# Patient Record
Sex: FEMALE | Race: WHITE | NOT HISPANIC OR LATINO | Employment: OTHER | URBAN - METROPOLITAN AREA
[De-identification: names, ages, dates, MRNs, and addresses within clinical notes are randomized per-mention and may not be internally consistent; named-entity substitution may affect disease eponyms.]

---

## 2017-02-08 ENCOUNTER — ALLSCRIPTS OFFICE VISIT (OUTPATIENT)
Dept: OTHER | Facility: OTHER | Age: 82
End: 2017-02-08

## 2017-02-08 DIAGNOSIS — Z13.820 ENCOUNTER FOR SCREENING FOR OSTEOPOROSIS: ICD-10-CM

## 2017-02-08 DIAGNOSIS — I10 ESSENTIAL (PRIMARY) HYPERTENSION: ICD-10-CM

## 2017-03-06 ENCOUNTER — HOSPITAL ENCOUNTER (OUTPATIENT)
Dept: BONE DENSITY | Facility: IMAGING CENTER | Age: 82
Discharge: HOME | End: 2017-03-06
Payer: MEDICARE

## 2017-03-06 DIAGNOSIS — Z13.820 ENCOUNTER FOR SCREENING FOR OSTEOPOROSIS: ICD-10-CM

## 2017-03-06 PROCEDURE — 77080 DXA BONE DENSITY AXIAL: CPT

## 2017-03-07 ENCOUNTER — GENERIC CONVERSION - ENCOUNTER (OUTPATIENT)
Dept: OTHER | Facility: OTHER | Age: 82
End: 2017-03-07

## 2017-06-14 ENCOUNTER — HOSPITAL ENCOUNTER (EMERGENCY)
Facility: HOSPITAL | Age: 82
Discharge: NURSING, REHAB OR LONG-TERM CARE | End: 2017-06-14
Attending: EMERGENCY MEDICINE | Admitting: EMERGENCY MEDICINE
Payer: MEDICARE

## 2017-06-14 ENCOUNTER — APPOINTMENT (EMERGENCY)
Dept: CT IMAGING | Facility: HOSPITAL | Age: 82
End: 2017-06-14
Payer: MEDICARE

## 2017-06-14 ENCOUNTER — APPOINTMENT (EMERGENCY)
Dept: RADIOLOGY | Facility: HOSPITAL | Age: 82
End: 2017-06-14
Payer: MEDICARE

## 2017-06-14 VITALS
DIASTOLIC BLOOD PRESSURE: 57 MMHG | RESPIRATION RATE: 16 BRPM | OXYGEN SATURATION: 100 % | HEART RATE: 61 BPM | SYSTOLIC BLOOD PRESSURE: 114 MMHG | TEMPERATURE: 97.6 F

## 2017-06-14 DIAGNOSIS — S73.102A SPRAIN OF LEFT HIP, INITIAL ENCOUNTER: Primary | ICD-10-CM

## 2017-06-14 DIAGNOSIS — N39.0 URINARY TRACT INFECTION: ICD-10-CM

## 2017-06-14 LAB
ALBUMIN SERPL BCP-MCNC: 2.9 G/DL (ref 3.5–5)
ALP SERPL-CCNC: 89 U/L (ref 46–116)
ALT SERPL W P-5'-P-CCNC: 18 U/L (ref 12–78)
ANION GAP SERPL CALCULATED.3IONS-SCNC: 4 MMOL/L (ref 4–13)
APTT PPP: 28 SECONDS (ref 23–35)
AST SERPL W P-5'-P-CCNC: 21 U/L (ref 5–45)
ATRIAL RATE: 74 BPM
BACTERIA UR QL AUTO: ABNORMAL /HPF
BASOPHILS # BLD AUTO: 0.01 THOUSANDS/ΜL (ref 0–0.1)
BASOPHILS NFR BLD AUTO: 0 % (ref 0–1)
BILIRUB SERPL-MCNC: 0.9 MG/DL (ref 0.2–1)
BILIRUB UR QL STRIP: NEGATIVE
BUN SERPL-MCNC: 18 MG/DL (ref 5–25)
CALCIUM SERPL-MCNC: 8.9 MG/DL (ref 8.3–10.1)
CHLORIDE SERPL-SCNC: 109 MMOL/L (ref 100–108)
CLARITY UR: CLEAR
CO2 SERPL-SCNC: 28 MMOL/L (ref 21–32)
COLOR UR: YELLOW
CREAT SERPL-MCNC: 0.68 MG/DL (ref 0.6–1.3)
EOSINOPHIL # BLD AUTO: 0.18 THOUSAND/ΜL (ref 0–0.61)
EOSINOPHIL NFR BLD AUTO: 3 % (ref 0–6)
ERYTHROCYTE [DISTWIDTH] IN BLOOD BY AUTOMATED COUNT: 14 % (ref 11.6–15.1)
GFR SERPL CREATININE-BSD FRML MDRD: >60 ML/MIN/1.73SQ M
GLUCOSE SERPL-MCNC: 106 MG/DL (ref 65–140)
GLUCOSE UR STRIP-MCNC: NEGATIVE MG/DL
HCT VFR BLD AUTO: 37.4 % (ref 34.8–46.1)
HGB BLD-MCNC: 12.5 G/DL (ref 11.5–15.4)
HGB UR QL STRIP.AUTO: ABNORMAL
INR PPP: 1.01 (ref 0.86–1.16)
KETONES UR STRIP-MCNC: NEGATIVE MG/DL
LEUKOCYTE ESTERASE UR QL STRIP: ABNORMAL
LYMPHOCYTES # BLD AUTO: 2.32 THOUSANDS/ΜL (ref 0.6–4.47)
LYMPHOCYTES NFR BLD AUTO: 39 % (ref 14–44)
MCH RBC QN AUTO: 28.9 PG (ref 26.8–34.3)
MCHC RBC AUTO-ENTMCNC: 33.4 G/DL (ref 31.4–37.4)
MCV RBC AUTO: 86 FL (ref 82–98)
MONOCYTES # BLD AUTO: 0.52 THOUSAND/ΜL (ref 0.17–1.22)
MONOCYTES NFR BLD AUTO: 9 % (ref 4–12)
NEUTROPHILS # BLD AUTO: 2.92 THOUSANDS/ΜL (ref 1.85–7.62)
NEUTS SEG NFR BLD AUTO: 49 % (ref 43–75)
NITRITE UR QL STRIP: NEGATIVE
NON-SQ EPI CELLS URNS QL MICRO: ABNORMAL /HPF
P AXIS: -21 DEGREES
PH UR STRIP.AUTO: 6.5 [PH] (ref 4.5–8)
PLATELET # BLD AUTO: 168 THOUSANDS/UL (ref 149–390)
PMV BLD AUTO: 9.9 FL (ref 8.9–12.7)
POTASSIUM SERPL-SCNC: 4.3 MMOL/L (ref 3.5–5.3)
PR INTERVAL: 152 MS
PROT SERPL-MCNC: 6.3 G/DL (ref 6.4–8.2)
PROT UR STRIP-MCNC: NEGATIVE MG/DL
PROTHROMBIN TIME: 13.1 SECONDS (ref 12.1–14.4)
QRS AXIS: 15 DEGREES
QRSD INTERVAL: 80 MS
QT INTERVAL: 414 MS
QTC INTERVAL: 459 MS
RBC # BLD AUTO: 4.33 MILLION/UL (ref 3.81–5.12)
RBC #/AREA URNS AUTO: ABNORMAL /HPF
SODIUM SERPL-SCNC: 141 MMOL/L (ref 136–145)
SP GR UR STRIP.AUTO: <=1.005 (ref 1–1.03)
SPECIMEN SOURCE: NORMAL
T WAVE AXIS: 42 DEGREES
TROPONIN I BLD-MCNC: 0 NG/ML (ref 0–0.08)
UROBILINOGEN UR QL STRIP.AUTO: 0.2 E.U./DL
VENTRICULAR RATE: 74 BPM
WBC # BLD AUTO: 5.95 THOUSAND/UL (ref 4.31–10.16)
WBC #/AREA URNS AUTO: ABNORMAL /HPF

## 2017-06-14 PROCEDURE — 74176 CT ABD & PELVIS W/O CONTRAST: CPT

## 2017-06-14 PROCEDURE — 36415 COLL VENOUS BLD VENIPUNCTURE: CPT | Performed by: EMERGENCY MEDICINE

## 2017-06-14 PROCEDURE — 85610 PROTHROMBIN TIME: CPT | Performed by: EMERGENCY MEDICINE

## 2017-06-14 PROCEDURE — 96361 HYDRATE IV INFUSION ADD-ON: CPT

## 2017-06-14 PROCEDURE — 85025 COMPLETE CBC W/AUTO DIFF WBC: CPT | Performed by: EMERGENCY MEDICINE

## 2017-06-14 PROCEDURE — 99285 EMERGENCY DEPT VISIT HI MDM: CPT

## 2017-06-14 PROCEDURE — 70450 CT HEAD/BRAIN W/O DYE: CPT

## 2017-06-14 PROCEDURE — 96360 HYDRATION IV INFUSION INIT: CPT

## 2017-06-14 PROCEDURE — 85730 THROMBOPLASTIN TIME PARTIAL: CPT | Performed by: EMERGENCY MEDICINE

## 2017-06-14 PROCEDURE — 71010 HB CHEST X-RAY 1 VIEW FRONTAL (PORTABLE): CPT

## 2017-06-14 PROCEDURE — 80053 COMPREHEN METABOLIC PANEL: CPT | Performed by: EMERGENCY MEDICINE

## 2017-06-14 PROCEDURE — 71250 CT THORAX DX C-: CPT

## 2017-06-14 PROCEDURE — 81001 URINALYSIS AUTO W/SCOPE: CPT | Performed by: EMERGENCY MEDICINE

## 2017-06-14 PROCEDURE — 84484 ASSAY OF TROPONIN QUANT: CPT

## 2017-06-14 PROCEDURE — 93005 ELECTROCARDIOGRAM TRACING: CPT

## 2017-06-14 PROCEDURE — 72125 CT NECK SPINE W/O DYE: CPT

## 2017-06-14 RX ORDER — SULFAMETHOXAZOLE AND TRIMETHOPRIM 800; 160 MG/1; MG/1
1 TABLET ORAL ONCE
Status: COMPLETED | OUTPATIENT
Start: 2017-06-14 | End: 2017-06-14

## 2017-06-14 RX ORDER — SULFAMETHOXAZOLE AND TRIMETHOPRIM 800; 160 MG/1; MG/1
1 TABLET ORAL 2 TIMES DAILY
Qty: 10 TABLET | Refills: 0 | Status: SHIPPED | OUTPATIENT
Start: 2017-06-14 | End: 2017-06-19

## 2017-06-14 RX ADMIN — SULFAMETHOXAZOLE AND TRIMETHOPRIM 1 TABLET: 800; 160 TABLET ORAL at 04:05

## 2017-06-14 RX ADMIN — SODIUM CHLORIDE 500 ML: 0.9 INJECTION, SOLUTION INTRAVENOUS at 02:27

## 2017-12-13 ENCOUNTER — ALLSCRIPTS OFFICE VISIT (OUTPATIENT)
Dept: OTHER | Facility: OTHER | Age: 82
End: 2017-12-13

## 2017-12-13 DIAGNOSIS — R73.01 IMPAIRED FASTING GLUCOSE: ICD-10-CM

## 2017-12-13 DIAGNOSIS — I48.0 PAROXYSMAL ATRIAL FIBRILLATION (HCC): ICD-10-CM

## 2017-12-13 DIAGNOSIS — I10 ESSENTIAL (PRIMARY) HYPERTENSION: ICD-10-CM

## 2017-12-14 NOTE — PROGRESS NOTES
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 (434 91,784 3) (I63 9,R47 01)   4  Hypertension (401 9) (I10)   5  Impaired fasting glucose (790 21) (R73 01)    Plan  Hypertension    · Metoprolol Tartrate 50 MG Oral Tablet (Lopressor); TAKE 1 TABLET THREE TIMES  DAILY   · (1) CBC/ PLT (NO DIFF); Status:Active; Requested for:86Atz5226;    · (1) COMPREHENSIVE METABOLIC PANEL; Status:Active; Requested for:69Qtc0601;    · (1) T4, FREE; Status:Active; Requested for:52Hfu7143;    · (1) TSH; Status:Active; Requested for:02Uxl9276;   Impaired fasting glucose    · (1) HEMOGLOBIN A1C; Status:Active; Requested for:80Dru5794;   PMH: History of atrial fibrillation    · Eliquis 2 5 MG Oral Tablet  Seizure, epileptic    · LevETIRAcetam 750 MG Oral Tablet; TAKE 1 TABLET TWICE DAILY    Discussion/Summary  Discussion Summary:   Will stop Eliquis at today and will start warfarin 2 5 mg daily on December 14th  Will check INR on December 18th along with CBC, CMP, TSH, free T4 and hemoglobin A1c review of records from February patient had impaired fasting glucose and her TSH was low  Counseling Documentation With Imm: The patient was counseled regarding instructions for management  Chief Complaint  Chief Complaint Free Text Note Form: Pt here for f/u  She did not want me to push her back to the room  She didnât want anyone near her  She did let me get her bp though  She denied wt and ht  dk       History of Present Illness  Atrial Fibrillation (Follow-Up): The patient presents with paroxysmal atrial fibrillation  Symptoms: denies palpitations  Risks: increased risk for falling  Medications: the patient is adherent with her medication regimen  Seizure Disorder (Follow-Up): The patient is being seen for a routine clinic follow-up of seizure disorder  Associated symptoms:  no headache  Current treatment includes levetiracetam (Keppra)  Hypertension (Follow-Up): The patient presents for follow-up of essential hypertension  The patient states she has been doing well with her blood pressure control since the last visit  Symptoms: denies dyspnea,-- denies chest pain-- and-- denies lower extremity edema  Medications: the patient is adherent with her medication regimen  -- She denies medication side effects  HPI: I am seeing patient for anticoagulation reassessment  Patient had history of stroke due to paroxysmal atrial fibrillation  Patient has been on Eliquis which is very expensive and patient's son requests that we switch her to warfarin  spoke with gap be from August patient's nurse who told me that patient has no evidence of bleeding  she has no difficulty swallowing, cough or choking on swallowing  She is wheelchair-bound basically  She had a couple of falls without any injuries  She had no seizures and overall has been stable  Review of Systems  Complete-Female:  Constitutional: no fever-- and-- no chills  ENT: no nasal discharge  Cardiovascular: no chest pain-- and-- no palpitations  Respiratory: no shortness of breath-- and-- no cough  Gastrointestinal: no abdominal pain,-- no constipation-- and-- no blood in stools  Genitourinary: no dysuria-- and-- no unexplained vaginal bleeding  Neurological: no headache  Psychiatric: no depression  Active Problems  1  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 (434 91,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 3) (I63 9,R47 01)   2  Hyperlipidemia (272 4) (E78 5)   3  Hypertension (401 9) (I10)   4  Impaired fasting glucose (790 21) (R73 01)   5  Seizure, epileptic (345 90) (G40 909)    Past Medical History  1  History of Cellulitis (682 9) (L03 90)   2  History of Closed Fracture Of The Fibula Shaft (823 21)   3  History of Confusion (298 9) (R41 0)   4  History of Fall (E888 9) [facility])   5  History of Fracture, thoracic vertebra, compression (805 2) (S22 000A)   6   History of Herpes zoster without complications (834 3) (B02 9)   7  History of being hospitalized (V13 9) (Z92 89)   8  History of Laceration (879 8)   9  History of Osteoporosis (733 00) (M81 0)   10  History of Rash (782 1) (R21)   11  History of Right leg weakness (729 89) (K81 022)  Active Problems And Past Medical History Reviewed: The active problems and past medical history were reviewed and updated today  Family History  Father    1  Family history of cardiac disorder (V17 49) (Z82 49)  Sister    2  Family history of Dementia    Social History     · Cultural background   · Former smoker (V15 82) (K83 148)   · Living in an assisted living facility   · Non drinker / no alcohol use   · Primary spoken language English   · Racial background   ·   Social History Reviewed: The social history was reviewed and updated today  Current Meds   1  Atorvastatin Calcium 10 MG Oral Tablet; TAKE 1 TABLET DAILY AT BEDTIME  Requested for: 24Feb2017; Last Rx:24Feb2017 Ordered   2  Bisacodyl 10 MG Rectal Suppository; Therapy: (Recorded:12Sep2016) to Recorded   3  Calcium 500 MG TABS; Therapy: (Recorded:12Sep2016) to Recorded   4  D3-1000 1000 UNIT Oral Tablet; TAKE 1 TABLET DAILY; Therapy: (Recorded:04Mar2014) to Recorded   5  Eliquis 2 5 MG Oral Tablet; 1 BID; Therapy: (Recorded:02Niu0759) to Recorded   6  Lactulose 10 GM/15ML Oral Solution; TAKE 15 ML DAILY; Therapy: 72PRB7719 to Recorded   7  LevETIRAcetam 750 MG Oral Tablet; TAKE 1 TABLET TWICE DAILY; Therapy: 09KCD9630 to (Evaluate:24Jun2017)  Requested for: 25FFP9734; Last Rx:24Feb2017 Ordered   8  Mapap 325 MG Oral Tablet; Therapy: (Recorded:22Yoh2000) to Recorded   9  Metoprolol Tartrate 50 MG Oral Tablet (Lopressor); TAKE 1 TABLET THREE TIMES  DAILY; Therapy: (Colan Renee) to  Requested for: 28Feb2017 Recorded   10  Milk of Magnesia 400 MG/5ML Oral Suspension; Therapy: (Recorded:87Kdw9891) to Recorded   11   Nitrofurantoin Monohyd Macro 100 MG Oral Capsule; TAKE 1 CAPSULE EVERY 12 HOURS DAILY; Therapy: 99VMG8814 to (Evaluate:79Bek0059); Last Rx:96Agt6066 Ordered   12  Oxycodone-Acetaminophen 5-325 MG Oral Tablet; Therapy: (Recorded:85Piv0539) to Recorded   13  Oyster Shell Calcium/Vitamin D 500-200 MG-UNIT Oral Tablet; take 1 tab daily - Please given the  same as before; Therapy: 40YBQ8014 to (Last Rx:56Esr3327)  Requested for: 18Dqz0043 Ordered   14  Tylenol 325 MG Oral Tablet; TAKE 2 TABLET Every 4 hours PRN pain, fever; Therapy: 09NHF2089 to Recorded  Medication List Reviewed: The medication list was reviewed and updated today  Allergies  1  ceftriaxone   2  Codeine Derivatives   3  LevoFLOXacin TABS  4  No Known Environmental Allergies   5  No Known Food Allergies    Vitals  Vital Signs    Recorded: 13Dec2017 08:51AM   Heart Rate 72   Respiration 16   Systolic 535   Diastolic 82       Physical Exam   Constitutional  General appearance: No acute distress, well appearing and well nourished  well developed-- and-- comfortable  Pulmonary  Auscultation of lungs: Clear to auscultation  Cardiovascular  Auscultation of heart: Normal rate and rhythm, normal S1 and S2, without murmurs  The heart rate was normal  The rhythm was regular  Abdomen  Abdomen: Non-tender, no masses  Lymphatic  Palpation of lymph nodes in neck: No lymphadenopathy  Neurologic  Reflexes: Abnormal  -- patient has mild right-sided glen paresis and expressive aphasia  Psychiatric  Mood and affect: Normal          Future Appointments    Date/Time Provider Specialty Site   01/11/2018 10:45 AM NOHEMY Calvin   Internal Medicine [facility] INTERNAL MED       Signatures   Electronically signed by : NOHEMY Arroyo ; Dec 13 2017  9:12AM EST                       (Author)

## 2018-01-11 ENCOUNTER — ALLSCRIPTS OFFICE VISIT (OUTPATIENT)
Dept: OTHER | Facility: OTHER | Age: 83
End: 2018-01-11

## 2018-01-12 ENCOUNTER — GENERIC CONVERSION - ENCOUNTER (OUTPATIENT)
Dept: OTHER | Facility: OTHER | Age: 83
End: 2018-01-12

## 2018-01-12 NOTE — RESULT NOTES
Message   c diff neg     Verified Results  (1) C  DIFFICILE TOXIN BY PCR [id number] 08:50PM Edilberto Omi     Test Name Result Flag Reference   C  DIFFICILE TOXIN BY PCR   NEGATIVE for C difficle toxin by PCR  NEGATIVE for C difficle toxin by PCR

## 2018-01-14 VITALS
SYSTOLIC BLOOD PRESSURE: 114 MMHG | DIASTOLIC BLOOD PRESSURE: 78 MMHG | HEART RATE: 82 BPM | TEMPERATURE: 96.9 F | HEIGHT: 63 IN

## 2018-01-17 NOTE — RESULT NOTES
Verified Results  * DXA BONE DENSITY SPINE HIP AND PELVIS [id number] 01:13PM Ramy Riley Order Number: [[id number]]    - Patient Instructions: To schedule this appointment, please contact Central Scheduling at [phone]  Test Name Result Flag Reference   DXA BONE DENSITY SPINE HIP AND PELVIS (Report)     DXA SCAN     CLINICAL HISTORY: 80-year-old woman  Agent menopause not known  OTHER RISK FACTORS: History of left hip fracture following a low level of injury  TECHNIQUE: Bone densitometry was performed using a H[employer] Horizon A bone densitometer  Regions of interest appear properly placed  There are no prior DXA studies performed on this unit for comparison  COMPARISON: None     RESULTS:      LUMBAR SPINE L1-L4 (L2): BMD 0 799 gm/cm2 / T-score -2 3 / Z score 0 5   (Lumbar levels within parentheses represent vertebrae excluded from analysis due to local structural abnormalities or artifact)  RIGHT TOTAL HIP: BMD 0 557 gm/cm2 / T-score -3 2 / Z score -1 0   RIGHT FEMORAL NECK: BMD 0 572 gm/cm2 / T score -2 5 / Z score -0 1       IMPRESSION:     1  Osteoporosis  2  The 10 year risk of hip fracture is 8 7% with the 10 year risk of major osteoporotic fracture being 26% as calculated by the [facility]/WHO fracture risk assessment tool (FRAX)  3  The current NOF guidelines recommend treating patients with a T-score of -2 5 or less in the lumbar spine or hips, or in post-menopausal women and men over the age of 48 with low bone mass (osteopenia) and a FRAX 10 year risk score of >3% for hip    fracture and/or >20% for major osteoporotic fracture  4  A daily intake of at least 1200 mg calcium and vitamin D 800- 1000 IU, as well as weight bearing and muscle strengthening exercise, fall prevention and avoidance of tobacco and excessive alcohol as preventive measurements are suggested  5  Follow-up DXA in two years is recommended for most patients   A one year follow-up DXA is recommended after initiation or change in therapy for osteoporosis  More frequent evaluation is also appropriate for patients with conditions associated with    rapid bone loss, such as glucocorticoid therapy  The FRAX tool has not been validated in patients currently or previously treated with pharmacotherapy for osteoporosis  In such patients, clinical judgment must be exercised in interpreting FRAX scores  It is not appropriate to use FRAX to monitor    treatment response         WHO CLASSIFICATION:   Normal (a T-score of -1 0 or higher)   Low bone mineral density (a T-score of less than -1 0 but higher than -2 5)   Osteoporosis (a T-score of -2 5 or less)   Severe osteoporosis (a T-score of -2 5 or less with a fragility fracture)       Workstation performed: [id number]     Signed by:   Rory Méndez MD   3/7/17

## 2018-01-23 VITALS
TEMPERATURE: 98.9 F | HEIGHT: 63 IN | WEIGHT: 155 LBS | SYSTOLIC BLOOD PRESSURE: 132 MMHG | HEART RATE: 74 BPM | RESPIRATION RATE: 16 BRPM | DIASTOLIC BLOOD PRESSURE: 64 MMHG | BODY MASS INDEX: 27.46 KG/M2

## 2018-01-23 VITALS — HEART RATE: 72 BPM | DIASTOLIC BLOOD PRESSURE: 82 MMHG | RESPIRATION RATE: 16 BRPM | SYSTOLIC BLOOD PRESSURE: 118 MMHG

## 2018-01-23 DIAGNOSIS — E05.90 THYROTOXICOSIS WITHOUT THYROID STORM: ICD-10-CM

## 2018-02-06 ENCOUNTER — HOSPITAL ENCOUNTER (EMERGENCY)
Facility: HOSPITAL | Age: 83
Discharge: HOME | End: 2018-02-06
Attending: EMERGENCY MEDICINE | Admitting: EMERGENCY MEDICINE
Payer: MEDICARE

## 2018-02-06 ENCOUNTER — APPOINTMENT (EMERGENCY)
Dept: CT IMAGING | Facility: HOSPITAL | Age: 83
End: 2018-02-06
Payer: MEDICARE

## 2018-02-06 VITALS
RESPIRATION RATE: 25 BRPM | BODY MASS INDEX: 27 KG/M2 | DIASTOLIC BLOOD PRESSURE: 75 MMHG | SYSTOLIC BLOOD PRESSURE: 165 MMHG | WEIGHT: 150 LBS | TEMPERATURE: 98.4 F | OXYGEN SATURATION: 95 % | HEART RATE: 81 BPM

## 2018-02-06 DIAGNOSIS — S09.90XA INJURY OF HEAD, INITIAL ENCOUNTER: Primary | ICD-10-CM

## 2018-02-06 LAB
ANION GAP BLD CALC-SCNC: 6 MMOL/L (ref 4–13)
APTT PPP: 34 SECONDS (ref 23–35)
ATRIAL RATE: 76 BPM
BUN BLD-MCNC: 17 MG/DL (ref 5–25)
CA-I BLD-SCNC: 1.16 MMOL/L (ref 1.12–1.32)
CHLORIDE BLD-SCNC: 103 MMOL/L (ref 100–108)
CREAT BLD-MCNC: 0.8 MG/DL (ref 0.6–1.3)
GFR SERPL CREATININE-BSD FRML MDRD: 69 ML/MIN/1.73SQ M
GLUCOSE SERPL-MCNC: 100 MG/DL (ref 65–140)
HCT VFR BLD CALC: 38 % (ref 34.8–46.1)
HGB BLDA-MCNC: 12.9 G/DL (ref 11.5–15.4)
INR PPP: 1.78 (ref 0.86–1.16)
P AXIS: 2 DEGREES
PCO2 BLD: 38 MMOL/L (ref 21–32)
POTASSIUM BLD-SCNC: 4.5 MMOL/L (ref 3.5–5.3)
PR INTERVAL: 148 MS
PROTHROMBIN TIME: 20.5 SECONDS (ref 12.1–14.4)
QRS AXIS: -1 DEGREES
QRSD INTERVAL: 70 MS
QT INTERVAL: 408 MS
QTC INTERVAL: 459 MS
SODIUM BLD-SCNC: 141 MMOL/L (ref 136–145)
SPECIMEN SOURCE: ABNORMAL
T WAVE AXIS: 56 DEGREES
VENTRICULAR RATE: 76 BPM

## 2018-02-06 PROCEDURE — 93005 ELECTROCARDIOGRAM TRACING: CPT

## 2018-02-06 PROCEDURE — 85014 HEMATOCRIT: CPT

## 2018-02-06 PROCEDURE — 80047 BASIC METABLC PNL IONIZED CA: CPT

## 2018-02-06 PROCEDURE — 85610 PROTHROMBIN TIME: CPT | Performed by: EMERGENCY MEDICINE

## 2018-02-06 PROCEDURE — 85730 THROMBOPLASTIN TIME PARTIAL: CPT | Performed by: EMERGENCY MEDICINE

## 2018-02-06 PROCEDURE — 93010 ELECTROCARDIOGRAM REPORT: CPT | Performed by: INTERNAL MEDICINE

## 2018-02-06 PROCEDURE — 99285 EMERGENCY DEPT VISIT HI MDM: CPT

## 2018-02-06 PROCEDURE — 36415 COLL VENOUS BLD VENIPUNCTURE: CPT | Performed by: EMERGENCY MEDICINE

## 2018-02-06 PROCEDURE — 70450 CT HEAD/BRAIN W/O DYE: CPT

## 2018-02-06 RX ORDER — WARFARIN SODIUM 2.5 MG/1
2.5 TABLET ORAL
COMMUNITY
End: 2019-08-28 | Stop reason: ALTCHOICE

## 2018-02-06 RX ORDER — LEVETIRACETAM 750 MG/1
750 TABLET ORAL 2 TIMES DAILY
COMMUNITY
End: 2018-10-31 | Stop reason: SDUPTHER

## 2018-02-07 NOTE — TRAUMA DOCUMENTATION
Transport ETA 12 midnight ([facility]) earliest available - all other companies no availability or later ETA

## 2018-02-07 NOTE — ED PROVIDER NOTES
History  Chief Complaint   Patient presents with    Fall     Pt was bowling and slipped and fell backwards hitting head; denies LOC; has not pain/discomfort  Pt is on coumadin  Fall       Prior to Admission Medications   Prescriptions Last Dose Informant Patient Reported? Taking? Acetaminophen (APAP) 325 MG tablet   Yes No   Sig: Take 650 mg by mouth every 6 (six) hours as needed for mild pain  Apixaban (ELIQUIS PO)   Yes No   Sig: Take 2 5 mg by mouth 2 (two) times a day   Cholecalciferol (VITAMIN D PO)   Yes No   Sig: Take by mouth  atorvastatin (LIPITOR) 10 mg tablet   Yes No   Sig: Take 10 mg by mouth daily  calcium-vitamin D (OSCAL 500 + D) 500 mg-200 units per tablet   Yes No   Sig: Take 1 tablet by mouth daily  influenza inactivated quadrivalent vaccine (FLUARIX) 0 5 ML KARMA   No No   Sig: Inject 0 5 mL into the shoulder, thigh, or buttocks prior to discharge (prior to discharge) for up to 1 dose   lactulose (CEPHULAC) 10 G packet   Yes No   Sig: Take 10 g by mouth daily  levETIRAcetam (KEPPRA) 750 mg tablet   No No   Sig: Take 1 tablet by mouth every 12 (twelve) hours for 30 days   metoprolol tartrate (LOPRESSOR) 50 mg tablet   Yes No   Sig: Take 50 mg by mouth every 8 (eight) hours      Facility-Administered Medications: None       Past Medical History:   Diagnosis Date    A[facility]fib ([facility] )     Ambulatory dysfunction     Anemia     Aphasia     Falls frequently     GERD (gastroesophageal reflux disease)     Hyperlipidemia     Muscle weakness     Seizures (HCC)     Stroke [facility] )     T6 vertebral fracture ([facility] )        Past Surgical History:   Procedure Laterality Date    [place] OPEN RX FEMUR FX+INTRAMED PRISCILLA Left 7/25/2016    Procedure: INSERTION NAIL IM FEMUR ANTEGRADE (TROCHANTERIC);   Surgeon: Abdulaziz Ortega MD;  Location:  MAIN OR;  Service: Orthopedics    WRIST FRACTURE SURGERY         Family History   Problem Relation Age of Onset    Heart disease Mother     Heart disease Father I have reviewed and agree with the history as documented  Social History   Substance Use Topics    Smoking status: Never Smoker    Smokeless tobacco: Never Used    Alcohol use No        Review of Systems    Physical Exam  ED Triage Vitals [02/06/18 1945]   Temperature Pulse Respirations Blood Pressure SpO2   98 4 °F (36 9 °C) 77 15 (!) 176/72 96 %      Temp src Heart Rate Source Patient Position - Orthostatic VS BP Location FiO2 (%)   -- Monitor -- -- --      Pain Score       No Pain           Orthostatic Vital Signs  Vitals:    02/06/18 1945   BP: (!) 176/72   Pulse: 77       Physical Exam    ED Medications  Medications - No data to display    Diagnostic Studies  Results Reviewed     Procedure Component Value Units Date/Time    Protime-INR [[id number]] Collected:  02/06/18 1958    Lab Status:  No result Specimen:  Blood from Arm, Right     APTT [[id number]] Collected:  02/06/18 1958    Lab Status:  No result Specimen:  Blood from Arm, Right                  CT head without contrast    (Results Pending)              Procedures  Procedures       Phone Contacts  ED Phone Contact    ED Course  ED Course                                [facility]  [facility] Time    Disposition  Final diagnoses:   None     ED Disposition     None      Follow-up Information    None       Patient's Medications   Discharge Prescriptions    No medications on file     No discharge procedures on file      ED Provider  Electronically Signed by           Chriss Gutierrez DO  02/06/18 2001

## 2018-02-07 NOTE — DISCHARGE INSTRUCTIONS

## 2018-02-07 NOTE — ED PROVIDER NOTES
H&P Exam - Trauma   Jenniffer Benavides 80 y o  female MRN: [id number]  Unit/Bed#: ED 09/ED 09 Encounter: [id number]    Assessment/Plan   Trauma Alert: Trauma Acuity: Trauma Evaluation  Model of Arrival: Trauma Mode of Arrival: ALS via Trauma Squad Name and Number: [id number]  Trauma Team: Current Providers  Attending Provider: Demario Soria DO  Registered Nurse: Huong Velasquez RN  Consultants: None    Trauma Active Problems:  Head injury on Coumadin    Trauma Plan:  CT scan head check  INR    Chief Complaint:   Chief Complaint   Patient presents with    Fall     Pt was bowling and slipped and fell backwards hitting head; denies LOC; has not pain/discomfort  Pt is on coumadin  History of Present Illness   HPI:  Jenniffer Benavides is a 80 y o  female who presents with  Occipital head injury after she fell backwards while bowling she is currently on Coumadin for atrial fibrillation denies any pain at this time no loss of consciousness    Mechanism:Details of Incident: pt was bowling at [employer], bent over to  bowling ball and lost balance falling backward and striking head on the ground Injury Date: 02/06/18 Injury Time: 1900 Injury Occurence Location - [address] [place] [address] Way: [employer]      80year-old on Coumadin who  Corinda Jose Roberto backwards and struck her head while bowling        History provided by:  Patient and EMS personnel  Fall   Mechanism of injury: fall    Incident location:  Nursing home  Fall:     Fall occurred:  Standing    Impact surface:  Hard floor    Point of impact:  Head    Entrapped after fall: no    Suspicion of alcohol use: no    Tetanus status:  Unknown  Prior to arrival data:     Blood loss:  None    Responsiveness at scene:  Alert    Orientation at scene:  Person, place, situation and time    Loss of consciousness: no      Amnesic to event: no      Airway interventions:  None    Breathing interventions:  None    IV access status:  Established    Airway condition since incident:  Stable Breathing condition since incident:  Stable    Circulation condition since incident:  Stable    Mental status condition since incident:  Stable    Disability condition since incident:  Stable  Associated symptoms: no back pain and no neck pain      Review of Systems   Musculoskeletal: Negative for back pain and neck pain  All other systems reviewed and are negative  Historical Information     Immunizations:   Immunization History   Administered Date(s) Administered    Influenza Split High Dose Preservative Free IM 10/06/2015    Influenza TIV (IM) 10/16/2013       Past Medical History:   Diagnosis Date    A-fib ([place] [address] )     Ambulatory dysfunction     Anemia     Aphasia     Falls frequently     GERD (gastroesophageal reflux disease)     Hyperlipidemia     Muscle weakness     Seizures (HCC)     [facility] ([facility] )     [facility]6 [facility] fracture ([facility])        Family History   Problem Relation Age of Onset    Heart disease Mother     Heart disease Father      Past Surgical History:   Procedure Laterality Date    [place] OPEN RX FEMUR FX+INTRAMED PRISCILLA Left 7/25/2016    Procedure: INSERTION NAIL IM FEMUR ANTEGRADE (TROCHANTERIC); Surgeon: Riya Sanchez MD;  Location: [facility] OR;  Service: Orthopedics    WRIST FRACTURE SURGERY         Social History     Social History    Marital status:      Spouse name: N/A    Number of children: N/A    Years of education: N/A     Social History Main Topics    Smoking status: Never Smoker    Smokeless tobacco: Never Used    Alcohol use No    Drug use: No    Sexual activity: Not Asked     Other Topics Concern    None     Social History Narrative    None       Family History: non-contributory    Meds/Allergies   Prior to Admission Medications   Prescriptions Last Dose Informant Patient Reported? Taking? Acetaminophen (APAP) 325 MG tablet   Yes No   Sig: Take 650 mg by mouth every 6 (six) hours as needed for mild pain     Cholecalciferol (VITAMIN D PO)   Yes No   Sig: Take 1,000 Int'l Units by mouth daily     calcium-vitamin D (OSCAL 500 + D) 500 mg-200 units per tablet   Yes No   Sig: Take 1 tablet by mouth daily  influenza inactivated quadrivalent vaccine (FLUARIX) 0 5 ML KARMA   No No   Sig: Inject 0 5 mL into the shoulder, thigh, or buttocks prior to discharge (prior to discharge) for up to 1 dose   lactulose (CEPHULAC) 10 G packet   Yes No   Sig: Take 10 g by mouth daily  levETIRAcetam (KEPPRA) 750 mg tablet   Yes Yes   Sig: Take 750 mg by mouth 2 (two) times a day   magnesium hydroxide (MILK OF MAGNESIA) 400 mg/5 mL oral suspension   Yes Yes   Sig: Take 30 mL by mouth daily as needed for constipation   metoprolol tartrate (LOPRESSOR) 50 mg tablet   Yes No   Sig: Take 50 mg by mouth every 8 (eight) hours   warfarin (COUMADIN) 2 5 mg tablet   Yes Yes   Sig: Take 2 5 mg by mouth daily      Facility-Administered Medications: None       Allergies   Allergen Reactions    Ceftriaxone     Codeine     Levofloxacin        PHYSICAL EXAM    PE limited by:  nothing    Objective   Vitals:   First set: Temperature: 98 4 °F (36 9 °C) (02/06/18 1945)  Pulse: 77 (02/06/18 1945)  Respirations: 15 (02/06/18 1945)  Blood Pressure: (!) 176/72 (02/06/18 1945)  SpO2: 96 % (02/06/18 1945)    Primary Survey:   (A) Airway:  patent  (B) Breathing:  Clear bilateral  (C) Circulation: Pulses:   normal  (D) Disabliity:  GCS Total:  15  (E) Expose:  Completed    Secondary Survey: (Click on Physical Exam tab above)  Physical Exam   Constitutional: She is oriented to person, place, and time  She appears well-developed and well-nourished  No distress  HENT:   Head: Normocephalic  Right Ear: External ear normal    Left Ear: External ear normal    Nose: Nose normal    Eyes: EOM are normal  Pupils are equal, round, and reactive to light  No scleral icterus  Neck: Normal range of motion  Neck supple  No tracheal deviation present  Cardiovascular: Normal rate and regular rhythm    Exam reveals no gallop and no friction rub  No murmur heard  Pulmonary/Chest: Effort normal and breath sounds normal  No stridor  No respiratory distress  She has no wheezes  She has no rales  Abdominal: Soft  Bowel sounds are normal  She exhibits no distension  There is no tenderness  Musculoskeletal: Normal range of motion  She exhibits no edema, tenderness or deformity  Neurological: She is alert and oriented to person, place, and time  No cranial nerve deficit  She exhibits normal muscle tone  Skin: Skin is warm and dry  No rash noted  She is not diaphoretic  Psychiatric: She has a normal mood and affect  Her behavior is normal  Thought content normal    Nursing note and vitals reviewed  Invasive Devices     Peripheral Intravenous Line            Peripheral IV 02/06/18 Right Antecubital less than 1 day                Lab Results:   Results Reviewed     Procedure Component Value Units Date/Time    Protime-INR [[id number]]  (Abnormal) Collected:  02/06/18 1958    Lab Status:  Final result Specimen:  Blood from Arm, Right Updated:  02/06/18 2047     Protime 20 5 (H) seconds      INR 1 78 (H)    APTT [[id number]]  (Normal) Collected:  02/06/18 1958    Lab Status:  Final result Specimen:  Blood from Arm, Right Updated:  02/06/18 2047     PTT 34 seconds     Narrative:          Therapeutic Heparin Range = 60-90 seconds    POCT Chem 8+ [[id number]]  (Abnormal) Collected:  02/06/18 2003    Lab Status:  Final result Updated:  02/06/18 2007     SODIUM, I-STAT 141 mmol/l      Potassium, i-STAT 4 5 mmol/L      Chloride, istat 103 mmol/L      CO2, i-STAT 38 (H) mmol/L      Anion Gap, Istat 6 mmol/L      Calcium, Ionized i-STAT 1 16 mmol/L      BUN, I-STAT 17 mg/dl      Creatinine, i-STAT 0 8 mg/dl      eGFR 69 ml/min/1 73sq m      Glucose, i-STAT 100 mg/dl      Hct, i-STAT 38 %      Hgb, i-STAT 12 9 g/dl      Specimen Type VENOUS                 Imaging Studies:   CT head without contrast   Final Result by Jeannette Bingham MD (02/06 2008)      No acute intracranial abnormality  Stable old left MCA territory infarct  Workstation performed: [id number]             Other Studies:  INR    Code Status: Prior  Advance Directive and Living Will:      Power of :    POLST:      Procedures  ECG 12 Lead Documentation  Date/Time: 2/6/2018 8:22 PM  Performed by: David Hand  Authorized by: David Hand     ECG reviewed by me, the ED Provider: yes    Patient location:  ED  Rhythm:     Rhythm: sinus rhythm    Ectopy:     Ectopy: none    T waves:     T waves: normal             Phone Contacts  ED Phone Contact     ED Course  ED Course          MDM  Number of Diagnoses or Management Options  Diagnosis management comments:  Head injury  While on Coumadin and will check CT scan of the head and  INR       Amount and/or Complexity of Data Reviewed  Clinical lab tests: ordered  Tests in the radiology section of CPT®: ordered      CritCare Time    Disposition  Final diagnoses:   Injury of head, initial encounter     Time reflects when diagnosis was documented in both MDM as applicable and the Disposition within this note     Time User Action Codes Description Comment    2/6/2018  8:27 PM Yandy Huddleston Add [S09 [id number]] Injury of head, initial encounter       ED Disposition     ED Disposition Condition Comment    [facility]  Condition at discharge: Stable        Follow-up Information     Follow up With Specialties Details Why 500 Steve Zimmerman MD Internal Medicine In 2 days As needed BARB Pollack   [address] [phone]          Patient's Medications   Discharge Prescriptions    No medications on file     No discharge procedures on file        ED Provider  Electronically Signed by         Blaise Blanca DO  02/06/18 1400

## 2018-02-08 LAB — INR PPP: 2.8 (ref 0.86–1.16)

## 2018-02-09 ENCOUNTER — ANTICOAG VISIT (OUTPATIENT)
Dept: FAMILY MEDICINE CLINIC | Facility: HOSPITAL | Age: 83
End: 2018-02-09

## 2018-02-09 DIAGNOSIS — I48.0 PAF (PAROXYSMAL ATRIAL FIBRILLATION) (HCC): Primary | ICD-10-CM

## 2018-03-09 ENCOUNTER — TELEPHONE (OUTPATIENT)
Dept: FAMILY MEDICINE CLINIC | Facility: HOSPITAL | Age: 83
End: 2018-03-09

## 2018-03-09 ENCOUNTER — ANTICOAG VISIT (OUTPATIENT)
Dept: FAMILY MEDICINE CLINIC | Facility: HOSPITAL | Age: 83
End: 2018-03-09

## 2018-03-09 LAB — INR PPP: 3.2 (ref 0.86–1.16)

## 2018-03-20 ENCOUNTER — TELEPHONE (OUTPATIENT)
Dept: FAMILY MEDICINE CLINIC | Facility: HOSPITAL | Age: 83
End: 2018-03-20

## 2018-03-20 NOTE — TELEPHONE ENCOUNTER
I spoke to luciana she stated she just noticed pt had an inr done last week and they never heard from us she figured we did not get the fax    She is going to have pt get rechecked as soon as she is able DD

## 2018-03-22 LAB — INR PPP: 4.2 (ref 0.86–1.16)

## 2018-03-23 ENCOUNTER — ANTICOAG VISIT (OUTPATIENT)
Dept: FAMILY MEDICINE CLINIC | Facility: HOSPITAL | Age: 83
End: 2018-03-23

## 2018-03-29 LAB — INR PPP: 2 (ref 0.86–1.16)

## 2018-04-04 ENCOUNTER — TELEPHONE (OUTPATIENT)
Dept: FAMILY MEDICINE CLINIC | Facility: HOSPITAL | Age: 83
End: 2018-04-04

## 2018-04-04 NOTE — TELEPHONE ENCOUNTER
I spoke to Alvarado Morris at [facility] she is aware of instructions and dose   DD        Please advise DD

## 2018-04-05 ENCOUNTER — ANTICOAG VISIT (OUTPATIENT)
Dept: FAMILY MEDICINE CLINIC | Facility: HOSPITAL | Age: 83
End: 2018-04-05

## 2018-04-10 ENCOUNTER — HOSPITAL ENCOUNTER (INPATIENT)
Facility: HOSPITAL | Age: 83
LOS: 2 days | Discharge: HOME HEALTH | DRG: 683 | End: 2018-04-13
Attending: EMERGENCY MEDICINE | Admitting: HOSPITALIST
Payer: MEDICARE

## 2018-04-10 ENCOUNTER — APPOINTMENT (EMERGENCY)
Dept: RADIOLOGY | Facility: HOSPITAL | Age: 83
DRG: 683 | End: 2018-04-10
Payer: MEDICARE

## 2018-04-10 DIAGNOSIS — N17.9 AKI (ACUTE KIDNEY INJURY) (HCC): Primary | ICD-10-CM

## 2018-04-10 DIAGNOSIS — N39.0 UTI (URINARY TRACT INFECTION): ICD-10-CM
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
## 2018-04-10 LAB
ALBUMIN SERPL BCP-MCNC: 1.9 G/DL (ref 3.5–5)
ALP SERPL-CCNC: 84 U/L (ref 46–116)
ALT SERPL W P-5'-P-CCNC: 18 U/L (ref 12–78)
ANION GAP SERPL CALCULATED.3IONS-SCNC: 3 MMOL/L (ref 4–13)
AST SERPL W P-5'-P-CCNC: 32 U/L (ref 5–45)
BACTERIA UR QL AUTO: ABNORMAL /HPF
BASOPHILS # BLD AUTO: 0.04 THOUSANDS/ΜL (ref 0–0.1)
BASOPHILS NFR BLD AUTO: 0 % (ref 0–1)
BILIRUB SERPL-MCNC: 0.42 MG/DL (ref 0.2–1)
BILIRUB UR QL STRIP: NEGATIVE
BUN SERPL-MCNC: 25 MG/DL (ref 5–25)
CALCIUM SERPL-MCNC: 8.5 MG/DL
CHLORIDE SERPL-SCNC: 108 MMOL/L (ref 100–108)
CK SERPL-CCNC: 122 U/L (ref 26–192)
CLARITY UR: ABNORMAL
CO2 SERPL-SCNC: 25 MMOL/L (ref 21–32)
COLOR UR: YELLOW
CREAT SERPL-MCNC: 1.98 MG/DL (ref 0.6–1.3)
EOSINOPHIL # BLD AUTO: 0.23 THOUSAND/ΜL (ref 0–0.61)
EOSINOPHIL NFR BLD AUTO: 2 % (ref 0–6)
ERYTHROCYTE [DISTWIDTH] IN BLOOD BY AUTOMATED COUNT: 13 % (ref 11.6–15.1)
GFR SERPL CREATININE-BSD FRML MDRD: 23 ML/MIN/1.73SQ M
GLUCOSE SERPL-MCNC: 222 MG/DL (ref 65–140)
GLUCOSE UR STRIP-MCNC: NEGATIVE MG/DL
HCT VFR BLD AUTO: 40.4 % (ref 34.8–46.1)
HGB BLD-MCNC: 13.6 G/DL (ref 11.5–15.4)
HGB UR QL STRIP.AUTO: NEGATIVE
HYALINE CASTS #/AREA URNS LPF: ABNORMAL /LPF
INR PPP: 1.93 (ref 0.86–1.16)
KETONES UR STRIP-MCNC: NEGATIVE MG/DL
LEUKOCYTE ESTERASE UR QL STRIP: ABNORMAL
LYMPHOCYTES # BLD AUTO: 2.28 THOUSANDS/ΜL (ref 0.6–4.47)
LYMPHOCYTES NFR BLD AUTO: 22 % (ref 14–44)
MCH RBC QN AUTO: 28.9 PG (ref 26.8–34.3)
MCHC RBC AUTO-ENTMCNC: 33.7 G/DL (ref 31.4–37.4)
MCV RBC AUTO: 86 FL (ref 82–98)
MONOCYTES # BLD AUTO: 0.69 THOUSAND/ΜL (ref 0.17–1.22)
MONOCYTES NFR BLD AUTO: 7 % (ref 4–12)
NEUTROPHILS # BLD AUTO: 7.36 THOUSANDS/ΜL (ref 1.85–7.62)
NEUTS SEG NFR BLD AUTO: 69 % (ref 43–75)
NITRITE UR QL STRIP: POSITIVE
NON-SQ EPI CELLS URNS QL MICRO: ABNORMAL /HPF
NRBC BLD AUTO-RTO: 0 /100 WBCS
PH UR STRIP.AUTO: 8.5 [PH] (ref 4.5–8)
PLATELET # BLD AUTO: 438 THOUSANDS/UL (ref 149–390)
PMV BLD AUTO: 9.4 FL (ref 8.9–12.7)
POTASSIUM SERPL-SCNC: 5.4 MMOL/L (ref 3.5–5.3)
PROT SERPL-MCNC: 6.8 G/DL (ref 6.4–8.2)
PROT UR STRIP-MCNC: NEGATIVE MG/DL
PROTHROMBIN TIME: 22.2 SECONDS (ref 12.1–14.4)
RBC # BLD AUTO: 4.7 MILLION/UL (ref 3.81–5.12)
RBC #/AREA URNS AUTO: ABNORMAL /HPF
SODIUM SERPL-SCNC: 136 MMOL/L (ref 136–145)
SP GR UR STRIP.AUTO: 1.01 (ref 1–1.03)
UROBILINOGEN UR QL STRIP.AUTO: 0.2 E.U./DL
WBC # BLD AUTO: 10.62 THOUSAND/UL (ref 4.31–10.16)
WBC #/AREA URNS AUTO: ABNORMAL /HPF

## 2018-04-10 PROCEDURE — 74176 CT ABD & PELVIS W/O CONTRAST: CPT

## 2018-04-10 PROCEDURE — 80053 COMPREHEN METABOLIC PANEL: CPT | Performed by: EMERGENCY MEDICINE

## 2018-04-10 PROCEDURE — 85610 PROTHROMBIN TIME: CPT | Performed by: EMERGENCY MEDICINE

## 2018-04-10 PROCEDURE — 36415 COLL VENOUS BLD VENIPUNCTURE: CPT | Performed by: EMERGENCY MEDICINE

## 2018-04-10 PROCEDURE — 72125 CT NECK SPINE W/O DYE: CPT

## 2018-04-10 PROCEDURE — 87186 SC STD MICRODIL/AGAR DIL: CPT | Performed by: EMERGENCY MEDICINE

## 2018-04-10 PROCEDURE — 82550 ASSAY OF CK (CPK): CPT | Performed by: EMERGENCY MEDICINE

## 2018-04-10 PROCEDURE — 99285 EMERGENCY DEPT VISIT HI MDM: CPT

## 2018-04-10 PROCEDURE — 85025 COMPLETE CBC W/AUTO DIFF WBC: CPT | Performed by: EMERGENCY MEDICINE

## 2018-04-10 PROCEDURE — 70450 CT HEAD/BRAIN W/O DYE: CPT

## 2018-04-10 PROCEDURE — 81001 URINALYSIS AUTO W/SCOPE: CPT | Performed by: EMERGENCY MEDICINE

## 2018-04-10 PROCEDURE — 87077 CULTURE AEROBIC IDENTIFY: CPT | Performed by: EMERGENCY MEDICINE

## 2018-04-10 PROCEDURE — 87086 URINE CULTURE/COLONY COUNT: CPT | Performed by: EMERGENCY MEDICINE

## 2018-04-10 PROCEDURE — 99220 PR INITIAL OBSERVATION CARE/DAY 70 MINUTES: CPT | Performed by: HOSPITALIST

## 2018-04-10 RX ORDER — WARFARIN SODIUM 1 MG/1
2 TABLET ORAL DAILY
COMMUNITY
End: 2019-04-23 | Stop reason: ALTCHOICE

## 2018-04-10 RX ORDER — METOPROLOL TARTRATE 50 MG/1
50 TABLET, FILM COATED ORAL EVERY 8 HOURS
Status: DISCONTINUED | OUTPATIENT
Start: 2018-04-10 | End: 2018-04-13 | Stop reason: HOSPADM

## 2018-04-10 RX ORDER — LACTULOSE 20 G/30ML
10 SOLUTION ORAL DAILY
Status: DISCONTINUED | OUTPATIENT
Start: 2018-04-11 | End: 2018-04-13 | Stop reason: HOSPADM

## 2018-04-10 RX ORDER — WARFARIN SODIUM 1 MG/1
1 TABLET ORAL
Status: DISCONTINUED | OUTPATIENT
Start: 2018-04-16 | End: 2018-04-13 | Stop reason: HOSPADM

## 2018-04-10 RX ORDER — ONDANSETRON 2 MG/ML
4 INJECTION INTRAMUSCULAR; INTRAVENOUS EVERY 6 HOURS PRN
Status: DISCONTINUED | OUTPATIENT
Start: 2018-04-10 | End: 2018-04-13 | Stop reason: HOSPADM

## 2018-04-10 RX ORDER — LEVETIRACETAM 750 MG/1
750 TABLET ORAL 2 TIMES DAILY
Status: DISCONTINUED | OUTPATIENT
Start: 2018-04-10 | End: 2018-04-13 | Stop reason: HOSPADM

## 2018-04-10 RX ORDER — SULFAMETHOXAZOLE AND TRIMETHOPRIM 800; 160 MG/1; MG/1
1 TABLET ORAL ONCE
Status: COMPLETED | OUTPATIENT
Start: 2018-04-10 | End: 2018-04-10

## 2018-04-10 RX ORDER — B-COMPLEX WITH VITAMIN C
1 TABLET ORAL DAILY
Status: DISCONTINUED | OUTPATIENT
Start: 2018-04-11 | End: 2018-04-13 | Stop reason: HOSPADM

## 2018-04-10 RX ORDER — ACETAMINOPHEN 325 MG/1
650 TABLET ORAL EVERY 6 HOURS PRN
Status: DISCONTINUED | OUTPATIENT
Start: 2018-04-10 | End: 2018-04-13 | Stop reason: HOSPADM

## 2018-04-10 RX ORDER — SODIUM CHLORIDE 9 MG/ML
100 INJECTION, SOLUTION INTRAVENOUS CONTINUOUS
Status: DISCONTINUED | OUTPATIENT
Start: 2018-04-10 | End: 2018-04-11

## 2018-04-10 RX ORDER — WARFARIN SODIUM 2.5 MG/1
2.5 TABLET ORAL
Status: DISCONTINUED | OUTPATIENT
Start: 2018-04-10 | End: 2018-04-13 | Stop reason: HOSPADM

## 2018-04-10 RX ADMIN — CEFAZOLIN SODIUM 1000 MG: 1 SOLUTION INTRAVENOUS at 16:39

## 2018-04-10 RX ADMIN — SODIUM CHLORIDE 100 ML/HR: 0.9 INJECTION, SOLUTION INTRAVENOUS at 17:30

## 2018-04-10 RX ADMIN — WARFARIN SODIUM 2.5 MG: 2.5 TABLET ORAL at 20:35

## 2018-04-10 RX ADMIN — LEVETIRACETAM 750 MG: 750 TABLET ORAL at 20:34

## 2018-04-10 RX ADMIN — SODIUM CHLORIDE 1000 ML: 0.9 INJECTION, SOLUTION INTRAVENOUS at 16:42

## 2018-04-10 RX ADMIN — METOPROLOL TARTRATE 50 MG: 50 TABLET ORAL at 19:49

## 2018-04-10 RX ADMIN — SULFAMETHOXAZOLE AND TRIMETHOPRIM 1 TABLET: 800; 160 TABLET ORAL at 16:43

## 2018-04-10 RX ADMIN — ACETAMINOPHEN 650 MG: 325 TABLET, FILM COATED ORAL at 19:49

## 2018-04-10 NOTE — H&P
H&P- Shara Stade 1935, 80 y o  female MRN: [id number]    Unit/Bed#: ED 10 Encounter: [id number]    Primary Care Provider: Dong Gold MD   Date and time admitted to hospital: 4/10/2018  9:56 AM        * Fall   Assessment & Plan    · Likely mechanical  No evidence of acute injury on imaging of CT head, CT C Spine, CT L spine or CT A/P  · Obtain PT/OT  · Fall precautions        MOE (acute kidney injury) ([facility] [address] )   Assessment & Plan    · No known hx of CKD  Last creatinine 6/2017 was 0 68  · Will give IVF and f/u AM BMP  · Avoid nephrotoxins         Urinary tract infection   Assessment & Plan    · Follow up urine cultures   · Got 1x dose of bactrim in ER but would avoid this with MOE as it could worsen creatinine  · Will give IV ancef (pt reports unknown allergy to ceftriaxone) so will monitor closely and f/u sensitivities   · Only mild leukocytosis, afebrile  Monitor symptoms         Essential hypertension   Assessment & Plan    · Continue lopressor at home dose of 50 mg q8h        Paroxysmal atrial fibrillation (HCC)   Assessment & Plan    · Continue lopressor 50 mg q8h (home dose) and coumadin  INR slightly subtherapeutic  · Continue home warfarin dose 1 mg q Monday, 2 5 mg rest of days        Hyperlipemia   Assessment & Plan    · Continue statin        Seizure disorder (HCC)   Assessment & Plan    · Continue keppra 750 mg BID        Dementia   Assessment & Plan    · Unknown baseline but seems alert to self and place  Not able to recall specific events of earlier          VTE Prophylaxis: Warfarin (Coumadin)  / sequential compression device   Code Status: DNR 3  POLST: POLST form is not discussed and not completed at this time  Discussion with family: none    Anticipated Length of Stay:  Patient will be admitted on an Observation basis with an anticipated length of stay of  < 2 midnights     Justification for Hospital Stay: MOE, IVF, IV abx    Total Time for Visit, including Counseling / Coordination of Care: 45 minutes  Greater than 50% of this total time spent on direct patient counseling and coordination of care  Chief Complaint:   Fall    History of Present Illness:    Dariel Pierce is a 80 y o  female with PMHx of dementia, afib on warfarin, HTN, seizure disorder and HLD who presents with what sounds like a mechanical fall from [facility]  Patient is a poor historian but seems that she was trying to use her walker to get up when use the restroom when she slipped and fell  She thinks she may have hit her head but currently denies any pain  She states that she did not have any symptoms prior to her fall such as dizziness, lightheadedness, chest pain, palpitations or shortness of breath  Patient is an overall poor historian but states that she has not been ill recently and has been hydrated, though her labs do show an acute kidney injury when she has no known documented history of chronic kidney disease  She denies any dysuria, hematuria or frequency but is tender in the suprapubic region  Review of Systems:    Review of Systems   Constitutional: Negative for chills, fatigue and fever  Respiratory: Negative for cough, shortness of breath and wheezing  Cardiovascular: Negative for chest pain, palpitations and leg swelling  Gastrointestinal: Negative for abdominal pain, diarrhea, nausea and vomiting  Genitourinary: Negative for dysuria, frequency and urgency  Musculoskeletal: Negative for neck pain and neck stiffness  Neurological: Negative for dizziness, seizures, syncope, weakness, light-headedness and headaches  All other systems reviewed and are negative        Past Medical and Surgical History:     Past Medical History:   Diagnosis Date    A-fib [facility])     Ambulatory dysfunction     Anemia     Aphasia     Falls frequently     GERD (gastroesophageal reflux disease)     Hyperlipidemia     Hypertension     Muscle weakness     Seizures (HCC)     Stroke ([facility] )     T6 vertebral fracture [facility])        Past Surgical History:   Procedure Laterality Date    SD OPEN RX FEMUR FX+INTRAMED PRISCILLA Left 7/25/2016    Procedure: INSERTION NAIL IM FEMUR ANTEGRADE (TROCHANTERIC); Surgeon: Alie Page MD;  Location:  MAIN OR;  Service: Orthopedics    WRIST FRACTURE SURGERY         Meds/Allergies:    Prior to Admission medications    Medication Sig Start Date End Date Taking? Authorizing Provider   Acetaminophen (APAP) 325 MG tablet Take 650 mg by mouth every 6 (six) hours as needed for mild pain  Yes Historical Provider, MD   calcium-vitamin D (OSCAL 500 + D) 500 mg-200 units per tablet Take 1 tablet by mouth daily  Yes Historical Provider, MD   Cholecalciferol (VITAMIN D PO) Take 1,000 Int'l Units by mouth daily     Yes Historical Provider, MD   lactulose (CEPHULAC) 10 G packet Take 10 g by mouth daily  Yes Historical Provider, MD   levETIRAcetam (KEPPRA) 750 mg tablet Take 750 mg by mouth 2 (two) times a day   Yes Historical Provider, MD   magnesium hydroxide (MILK OF MAGNESIA) 400 mg/5 mL oral suspension Take 30 mL by mouth daily as needed for constipation   Yes Historical Provider, MD   metoprolol tartrate (LOPRESSOR) 50 mg tablet Take 50 mg by mouth every 8 (eight) hours   Yes Historical Provider, MD   warfarin (COUMADIN) 1 mg tablet Take 1 mg by mouth daily Every Monday   Yes Historical Provider, MD   warfarin (COUMADIN) 2 5 mg tablet Take 2 5 mg by mouth daily One tablet 6 times weekly (omit Monday)    Yes Historical Provider, MD   influenza inactivated quadrivalent vaccine (FLUARIX) 0 5 ML KARMA Inject 0 5 mL into the shoulder, thigh, or buttocks prior to discharge (prior to discharge) for up to 1 dose 12/29/16   Gerald Campos MD     I have reviewed home medications using allscripts  Allergies:    Allergies   Allergen Reactions    Ceftriaxone     Codeine     Levofloxacin        Social History:     Marital Status:    Occupation: unclear  Patient Pre-hospital Living Situation: [facility]  Patient Pre-hospital Level of Mobility: may use walker? unclear  Patient Pre-hospital Diet Restrictions: none  Substance Use History:   History   Alcohol Use No     History   Smoking Status    Never Smoker   Smokeless Tobacco    Never Used     History   Drug Use No       Family History:    non-contributory    Physical Exam:     Vitals:   Blood Pressure: 143/67 (04/10/18 1500)  Pulse: 76 (04/10/18 1500)  Temperature: (!) 97 4 °F (36 3 °C) (04/10/18 1011)  Temp Source: Oral (04/10/18 1011)  Respirations: 21 (04/10/18 1500)  Height: 5' 2 5" (158 8 cm) (04/10/18 1011)  Weight - Scale: 68 kg (150 lb) (04/10/18 1011)  SpO2: 96 % (04/10/18 1500)    Physical Exam   Constitutional: She is oriented to person, place, and time  No distress  Cardiovascular: Normal rate and regular rhythm  No murmur heard  Pulmonary/Chest: Effort normal and breath sounds normal  No respiratory distress  She has no wheezes  Abdominal: Soft  Bowel sounds are normal  She exhibits no distension  There is tenderness  Musculoskeletal: She exhibits no edema  Neurological: She is alert and oriented to person, place, and time  Alert to self and place   Skin: Skin is warm and dry  Psychiatric: She has a normal mood and affect  Nursing note and vitals reviewed  Additional Data:     Lab Results: I have personally reviewed pertinent reports          Results from last 7 days  Lab Units 04/10/18  1047   WBC Thousand/uL 10 62*   HEMOGLOBIN g/dL 13 6   HEMATOCRIT % 40 4   PLATELETS Thousands/uL 438*   NEUTROS PCT % 69   LYMPHS PCT % 22   MONOS PCT % 7   EOS PCT % 2       Results from last 7 days  Lab Units 04/10/18  1030   SODIUM mmol/L 136   POTASSIUM mmol/L 5 4*   CHLORIDE mmol/L 108   CO2 mmol/L 25   BUN mg/dL 25   CREATININE mg/dL 1 98*   CALCIUM mg/dL 8 5   TOTAL PROTEIN g/dL 6 8   BILIRUBIN TOTAL mg/dL 0 42   ALK PHOS U/L 84   ALT U/L 18   AST U/L 32   GLUCOSE RANDOM mg/dL 222*       Results from last 7 days  Lab Units 04/10/18  1100   INR  1 93*       Imaging: I have personally reviewed pertinent reports  CT spine cervical without contrast   Final Result by Desmond Ng MD (04/10 1436)       No cervical spine fracture or traumatic malalignment  Workstation performed: [id number]         CT recon only lumbar spine   Final Result by Desmond Ng MD (04/10 1355)      No evidence of acute fracture or other significant abnormality in the lumbar spine  Workstation performed: [id number]         CT abdomen pelvis wo contrast   Final Result by Desmond Ng MD (04/10 1345)      No evidence of acute abnormality in the abdomen or pelvis  Workstation performed: [id number]         CT head without contrast   Final Result by Desmond Ng MD (04/10 1321)      Chronic left MCA territory infarct  No acute intracranial abnormality  Workstation performed: [id number]             EKG, Pathology, and Other Studies Reviewed on Admission:   · EKG: none    Allscripts / Epic Records Reviewed: Yes     ** Please Note: This note has been constructed using a voice recognition system   **

## 2018-04-10 NOTE — ED PROVIDER NOTES
History  Chief Complaint   Patient presents with    Fall     Pt fell while transferring to a wheel chair from her bed  Pt states she could not call for help and one of the wheelchair wheels was unlocked  C/o head and back pain     79 yo F from nursing facility on coumadin for afib with history of seizure disorder on keppra presents to ED with L sided head pain and back pain after fall  Pt says she was trying to get from the bed to the wheelchair to go to the bathroom when she fell  She was unable to get up on her own and was down for 4-6 hrs before somebody checked on her  Pt is unsure if she had LOC  She knows she hit her head but is unable to say where  No bowel or bladder incontinence  Per EMS, pt reportedly at baseline mental status, has expressive aphasia at baseline  Pt denies chest pain, shortness of breath, nausea/vomiting, lightheadedness, dizziness, heart palpitations, abdominal pain  No recent seizures  Prior to Admission Medications   Prescriptions Last Dose Informant Patient Reported? Taking? Acetaminophen (APAP) 325 MG tablet   Yes Yes   Sig: Take 650 mg by mouth every 6 (six) hours as needed for mild pain  Cholecalciferol (VITAMIN D PO)   Yes Yes   Sig: Take 1,000 Int'l Units by mouth daily     calcium-vitamin D (OSCAL 500 + D) 500 mg-200 units per tablet   Yes Yes   Sig: Take 1 tablet by mouth daily  influenza inactivated quadrivalent vaccine (FLUARIX) 0 5 ML KARMA Unknown at Unknown time  No No   Sig: Inject 0 5 mL into the shoulder, thigh, or buttocks prior to discharge (prior to discharge) for up to 1 dose   lactulose (CEPHULAC) 10 G packet   Yes Yes   Sig: Take 10 g by mouth daily     levETIRAcetam (KEPPRA) 750 mg tablet   Yes Yes   Sig: Take 750 mg by mouth 2 (two) times a day   magnesium hydroxide (MILK OF MAGNESIA) 400 mg/5 mL oral suspension   Yes Yes   Sig: Take 30 mL by mouth daily as needed for constipation   metoprolol tartrate (LOPRESSOR) 50 mg tablet   Yes Yes   Sig: Take 50 mg by mouth every 8 (eight) hours   warfarin (COUMADIN) 1 mg tablet   Yes Yes   Sig: Take 1 mg by mouth daily Every Monday   warfarin (COUMADIN) 2 5 mg tablet   Yes Yes   Sig: Take 2 5 mg by mouth daily One tablet 6 times weekly (omit Monday)       Facility-Administered Medications: None       Past Medical History:   Diagnosis Date    [facility]fib ([facility] )     Ambulatory dysfunction     Anemia     Aphasia     Falls frequently     GERD (gastroesophageal reflux disease)     Hyperlipidemia     Muscle weakness     Seizures ([facility] )     Stroke ([place] [address] )     T6 vertebral fracture ([place] [address] )        Past Surgical History:   Procedure Laterality Date    [place] OPEN RX FEMUR FX+INTRAMED PRISCILLA Left 7/25/2016    Procedure: INSERTION NAIL IM FEMUR ANTEGRADE (TROCHANTERIC); Surgeon: Rachelle Rocha MD;  Location: [facility];  Service: Orthopedics    WRIST FRACTURE SURGERY         Family History   Problem Relation Age of Onset    Heart disease Mother     Heart disease Father      I have reviewed and agree with the history as documented  Social History   Substance Use Topics    Smoking status: Never Smoker    Smokeless tobacco: Never Used    Alcohol use No        Review of Systems   Constitutional: Negative for activity change, chills and fever  HENT: Negative for congestion, facial swelling, rhinorrhea and sore throat  Eyes: Negative for pain, itching and visual disturbance  Respiratory: Negative for cough, chest tightness and shortness of breath  Cardiovascular: Negative for chest pain, palpitations and leg swelling  Gastrointestinal: Negative for abdominal pain, nausea and vomiting  Genitourinary: Negative for difficulty urinating, pelvic pain and vaginal bleeding  Musculoskeletal: Positive for back pain  Negative for neck pain and neck stiffness  Skin: Negative for color change, rash and wound  Neurological: Positive for speech difficulty (baseline) and headaches  Negative for light-headedness         Physical Exam  ED Triage Vitals   Temperature Pulse Respirations Blood Pressure SpO2   04/10/18 1011 04/10/18 1011 04/10/18 1011 04/10/18 1011 04/10/18 1011   (!) 97 4 °F (36 3 °C) 64 16 165/72 98 %      Temp Source Heart Rate Source Patient Position - Orthostatic VS BP Location FiO2 (%)   04/10/18 1011 04/10/18 1011 04/10/18 1130 04/10/18 1302 --   Oral Monitor Lying Right arm       Pain Score       04/10/18 1011       5           Orthostatic Vital Signs  Vitals:    04/10/18 1100 04/10/18 1130 04/10/18 1200 04/10/18 1302   BP: 162/68 157/63 153/67 153/68   Pulse: 66 65 68 69   Patient Position - Orthostatic VS:  Lying  Lying       Physical Exam   Constitutional: She is oriented to person, place, and time  No distress  Elderly appearing female   HENT:   Head: Normocephalic and atraumatic  Right Ear: External ear normal    Left Ear: External ear normal    Mouth/Throat: Oropharynx is clear and moist    Eyes: Conjunctivae and EOM are normal  Pupils are equal, round, and reactive to light  Right eye exhibits no discharge  Left eye exhibits no discharge  Neck:   c-collar in place   Cardiovascular: Normal rate, regular rhythm and intact distal pulses  Pulmonary/Chest: Effort normal and breath sounds normal  No stridor  No respiratory distress  She exhibits no tenderness  Abdominal: Soft  She exhibits no distension  There is no rebound and no guarding  Suprapubic, RLQ tenderness   Musculoskeletal: Normal range of motion  She exhibits no edema, tenderness or deformity  Neurological: She is alert and oriented to person, place, and time  No sensory deficit  She exhibits normal muscle tone  Skin: Skin is warm and dry  Nursing note and vitals reviewed        ED Medications  Medications   sulfamethoxazole-trimethoprim (BACTRIM DS) 800-160 mg per tablet 1 tablet (not administered)   sodium chloride 0 9 % bolus 1,000 mL (not administered)       Diagnostic Studies  Results Reviewed     Procedure Component Value Units Date/Time    Urine Microscopic [[id number]]  (Abnormal) Collected:  04/10/18 1143    Lab Status:  Final result Specimen:  Urine from Urine, Straight Cath Updated:  04/10/18 1238     RBC, UA None Seen /hpf      WBC, UA 10-20 (A) /hpf      Epithelial Cells None Seen /hpf      Bacteria, UA Moderate (A) /hpf      Hyaline Casts, UA None Seen /lpf     Urine culture [[id number]] Collected:  04/10/18 1143    Lab Status:   In process Specimen:  Urine from Urine, Straight Cath Updated:  04/10/18 1238    UA w Reflex to Microscopic w Reflex to Culture [[id number]]  (Abnormal) Collected:  04/10/18 1143    Lab Status:  Final result Specimen:  Urine from Urine, Straight Cath Updated:  04/10/18 1234     Color, UA Yellow     Clarity, UA Cloudy     Specific Gravity, UA 1 007     pH, UA 8 5 (H)     Leukocytes, UA Moderate (A)     Nitrite, UA Positive (A)     Protein, UA Negative mg/dl      Glucose, UA Negative mg/dl      Ketones, UA Negative mg/dl      Urobilinogen, UA 0 2 E U /dl      Bilirubin, UA Negative     Blood, UA Negative    Protime-INR [[id number]]  (Abnormal) Collected:  04/10/18 1100    Lab Status:  Final result Specimen:  Blood from Arm, Left Updated:  04/10/18 1137     Protime 22 2 (H) seconds      INR 1 93 (H)    Comprehensive metabolic panel [[id number]]  (Abnormal) Collected:  04/10/18 1030    Lab Status:  Final result Specimen:  Blood Updated:  04/10/18 1105     Sodium 136 mmol/L      Potassium 5 4 (H) mmol/L      Chloride 108 mmol/L      CO2 25 mmol/L      Anion Gap 3 (L) mmol/L      BUN 25 mg/dL      Creatinine 1 98 (H) mg/dL      Glucose 222 (H) mg/dL      Calcium 8 5 mg/dL      AST 32 U/L      ALT 18 U/L      Alkaline Phosphatase 84 U/L      Total Protein 6 8 g/dL      Albumin 1 9 (L) g/dL      Total Bilirubin 0 42 mg/dL      eGFR 23 ml/min/1 73sq m     Narrative:         National Kidney Disease Education Program recommendations are as follows:  GFR calculation is accurate only with a steady state creatinine  Chronic Kidney disease less than 60 ml/min/1 73 sq  meters  Kidney failure less than 15 ml/min/1 73 sq  meters  CK (with reflex to MB) [[id number]]  (Normal) Collected:  04/10/18 1030    Lab Status:  Final result Specimen:  Blood Updated:  04/10/18 1105     Total  U/L     CBC and differential [[id number]]  (Abnormal) Resulted:  04/10/18 1047    Lab Status:  Final result Specimen:  Blood Updated:  04/10/18 1047     WBC 10 62 (H) Thousand/uL      RBC 4 70 Million/uL      Hemoglobin 13 6 g/dL      Hematocrit 40 4 %      MCV 86 fL      MCH 28 9 pg      MCHC 33 7 g/dL      RDW 13 0 %      MPV 9 4 fL      Platelets 635 (H) Thousands/uL      nRBC 0 /100 WBCs      Neutrophils Relative 69 %      Lymphocytes Relative 22 %      Monocytes Relative 7 %      Eosinophils Relative 2 %      Basophils Relative 0 %      Neutrophils Absolute 7 36 Thousands/µL      Lymphocytes Absolute 2 28 Thousands/µL      Monocytes Absolute 0 69 Thousand/µL      Eosinophils Absolute 0 23 Thousand/µL      Basophils Absolute 0 04 Thousands/µL                  CT spine cervical without contrast   Final Result by Anusha Ryan MD (04/10 1436)       No cervical spine fracture or traumatic malalignment  Workstation performed: [id number]         CT recon only lumbar spine   Final Result by Anusha Ryan MD (04/10 1355)      No evidence of acute fracture or other significant abnormality in the lumbar spine  Workstation performed: [id number]         CT abdomen pelvis wo contrast   Final Result by Anusha Ryan MD (04/10 1345)      No evidence of acute abnormality in the abdomen or pelvis  Workstation performed: [id number]         CT head without contrast   Final Result by Anusha Ryan MD (04/10 1321)      Chronic left MCA territory infarct  No acute intracranial abnormality                    Workstation performed: [id number]               Procedures  Procedures      Phone Consults  ED Phone Contact    ED Course  ED Course                                MDM  Number of Diagnoses or Management Options  MOE (acute kidney injury) [facility]):   UTI (urinary tract infection):   Diagnosis management comments: Imaging for traumatic injuries shows chronic L2 compression fracture  Negative CT head except for chronic MCA infarct  MOE with Cr 1 98 (baseline 0 6), positive for UTI on UA with suprapubic tenderness  Pt with documented allergy to rocephin, levaquin  Will start pt on bactrim (previous UC with Providencia and E coli sensitive to bactrim) and admit to [facility] on obs      CritCare Time    Disposition  Final diagnoses:   MOE (acute kidney injury) ([facility] )   UTI (urinary tract infection)     Time reflects when diagnosis was documented in both MDM as applicable and the Disposition within this note     Time User Action Codes Description Comment    4/10/2018  2:49 PM Marca Felty Add [N17 9] MOE (acute kidney injury) ([facility] [address] )     4/10/2018  2:49 PM Marca Felty Add [N39 0] UTI (urinary tract infection)       ED Disposition     ED Disposition Condition Comment    Admit  Case was discussed with SHALA and the patient's admission status was agreed to be Admission Status: observation status to the service of Dr Antonia Riley  Follow-up Information    None       Patient's Medications   Discharge Prescriptions    No medications on file     No discharge procedures on file  ED Provider  Attending physically available and evaluated Kip Turcios I managed the patient along with the ED Attending      Electronically Signed by         Ross Wilson MD  04/10/18 6914

## 2018-04-10 NOTE — ASSESSMENT & PLAN NOTE
· No known hx of CKD   Last creatinine 6/2017 was 0 68  · Will give IVF and f/u AM BMP  · Avoid nephrotoxins

## 2018-04-10 NOTE — ED ATTENDING ATTESTATION
Dolores Corrales MD, saw and evaluated the patient  I have discussed the patient with the resident/non-physician practitioner and agree with the resident's/non-physician practitioner's findings, Plan of Care, and MDM as documented in the resident's/non-physician practitioner's note, except where noted  All available labs and Radiology studies were reviewed  At this point I agree with the current assessment done in the Emergency Department  I have conducted an independent evaluation of this patient a history and physical is as follows: This 51-year-old female with dementia presents status post fall from local nursing home  On exam patient is awake and alert but disoriented  Patient is unable to describe where pain is but states she is having pain  Patient moves all extremities with no difficulty  Patient has some tenderness to palpation in the lower abdomen  Patient also has some tenderness to palpation a lower back  Workup will include CT scan head, neck, L-spine, abdomen/pelvis  We will check basic labs and urinalysis  Patient disposition based on results of this  Patient's blood work and urinalysis concerning for UTI and MOE  Patient given antibiotics, IV fluids, admitted for further evaluation and treatment    Critical Care Time  CritCare Time    Procedures

## 2018-04-10 NOTE — ASSESSMENT & PLAN NOTE
· Follow up urine cultures   · Got 1x dose of bactrim in ER but would avoid this with MOE as it could worsen creatinine  · Will give IV ancef (pt reports unknown allergy to ceftriaxone) so will monitor closely and f/u sensitivities   · Only mild leukocytosis, afebrile   Monitor symptoms

## 2018-04-10 NOTE — ASSESSMENT & PLAN NOTE
· Unknown baseline but seems alert to self and place   Not able to recall specific events of earlier

## 2018-04-10 NOTE — ASSESSMENT & PLAN NOTE
· Likely mechanical  No evidence of acute injury on imaging of CT head, CT C Spine, CT L spine or CT A/P  · Obtain PT/OT  · Fall precautions

## 2018-04-10 NOTE — ASSESSMENT & PLAN NOTE
· Continue lopressor 50 mg q8h (home dose) and coumadin   INR slightly subtherapeutic  · Continue home warfarin dose 1 mg q Monday, 2 5 mg rest of days

## 2018-04-11 PROBLEM — E80.6 HYPERBILIRUBINEMIA: Status: ACTIVE | Noted: 2018-04-11

## 2018-04-11 LAB
ALBUMIN SERPL BCP-MCNC: 2.6 G/DL (ref 3.5–5)
ALP SERPL-CCNC: 87 U/L (ref 46–116)
ALT SERPL W P-5'-P-CCNC: 10 U/L (ref 12–78)
ANION GAP SERPL CALCULATED.3IONS-SCNC: 6 MMOL/L (ref 4–13)
AST SERPL W P-5'-P-CCNC: 12 U/L (ref 5–45)
BILIRUB SERPL-MCNC: 1.2 MG/DL (ref 0.2–1)
BUN SERPL-MCNC: 9 MG/DL (ref 5–25)
CALCIUM SERPL-MCNC: 8.1 MG/DL
CHLORIDE SERPL-SCNC: 111 MMOL/L (ref 100–108)
CO2 SERPL-SCNC: 22 MMOL/L (ref 21–32)
CREAT SERPL-MCNC: 0.42 MG/DL (ref 0.6–1.3)
ERYTHROCYTE [DISTWIDTH] IN BLOOD BY AUTOMATED COUNT: 13.8 % (ref 11.6–15.1)
GFR SERPL CREATININE-BSD FRML MDRD: 96 ML/MIN/1.73SQ M
GLUCOSE SERPL-MCNC: 102 MG/DL (ref 65–140)
HCT VFR BLD AUTO: 35.6 % (ref 34.8–46.1)
HGB BLD-MCNC: 12 G/DL (ref 11.5–15.4)
INR PPP: 1.98 (ref 0.86–1.16)
MCH RBC QN AUTO: 28.3 PG (ref 26.8–34.3)
MCHC RBC AUTO-ENTMCNC: 33.7 G/DL (ref 31.4–37.4)
MCV RBC AUTO: 84 FL (ref 82–98)
PLATELET # BLD AUTO: 157 THOUSANDS/UL (ref 149–390)
PMV BLD AUTO: 10 FL (ref 8.9–12.7)
POTASSIUM SERPL-SCNC: 4 MMOL/L (ref 3.5–5.3)
PROT SERPL-MCNC: 6 G/DL (ref 6.4–8.2)
PROTHROMBIN TIME: 22.7 SECONDS (ref 12.1–14.4)
RBC # BLD AUTO: 4.24 MILLION/UL (ref 3.81–5.12)
SODIUM SERPL-SCNC: 139 MMOL/L (ref 136–145)
WBC # BLD AUTO: 6.19 THOUSAND/UL (ref 4.31–10.16)

## 2018-04-11 PROCEDURE — 99232 SBSQ HOSP IP/OBS MODERATE 35: CPT | Performed by: NURSE PRACTITIONER

## 2018-04-11 PROCEDURE — 80053 COMPREHEN METABOLIC PANEL: CPT | Performed by: INTERNAL MEDICINE

## 2018-04-11 PROCEDURE — 97163 PT EVAL HIGH COMPLEX 45 MIN: CPT | Performed by: PHYSICAL THERAPIST

## 2018-04-11 PROCEDURE — G8979 MOBILITY GOAL STATUS: HCPCS | Performed by: PHYSICAL THERAPIST

## 2018-04-11 PROCEDURE — 85027 COMPLETE CBC AUTOMATED: CPT | Performed by: INTERNAL MEDICINE

## 2018-04-11 PROCEDURE — G8978 MOBILITY CURRENT STATUS: HCPCS | Performed by: PHYSICAL THERAPIST

## 2018-04-11 PROCEDURE — 85610 PROTHROMBIN TIME: CPT | Performed by: NURSE PRACTITIONER

## 2018-04-11 RX ADMIN — ACETAMINOPHEN 650 MG: 325 TABLET, FILM COATED ORAL at 15:44

## 2018-04-11 RX ADMIN — METOPROLOL TARTRATE 50 MG: 50 TABLET ORAL at 16:53

## 2018-04-11 RX ADMIN — CEFAZOLIN SODIUM 1000 MG: 1 SOLUTION INTRAVENOUS at 04:31

## 2018-04-11 RX ADMIN — ACETAMINOPHEN 650 MG: 325 TABLET, FILM COATED ORAL at 08:06

## 2018-04-11 RX ADMIN — CALCIUM CARBONATE-VITAMIN D TAB 500 MG-200 UNIT 1 TABLET: 500-200 TAB at 07:59

## 2018-04-11 RX ADMIN — SODIUM CHLORIDE 100 ML/HR: 0.9 INJECTION, SOLUTION INTRAVENOUS at 04:31

## 2018-04-11 RX ADMIN — LACTULOSE 10 G: 20 SOLUTION ORAL at 07:58

## 2018-04-11 RX ADMIN — LEVETIRACETAM 750 MG: 750 TABLET ORAL at 07:59

## 2018-04-11 RX ADMIN — METOPROLOL TARTRATE 50 MG: 50 TABLET ORAL at 07:59

## 2018-04-11 RX ADMIN — WARFARIN SODIUM 2.5 MG: 2.5 TABLET ORAL at 16:53

## 2018-04-11 RX ADMIN — LEVETIRACETAM 750 MG: 750 TABLET ORAL at 16:53

## 2018-04-11 RX ADMIN — CEFAZOLIN SODIUM 1000 MG: 1 SOLUTION INTRAVENOUS at 15:48

## 2018-04-11 NOTE — ASSESSMENT & PLAN NOTE
· Rate controlled, Continue lopressor 50 mg q8h (home dose) and coumadin  INR slightly subtherapeutic on admission  Repeat INR pending today  · Continue home warfarin dose 1 mg q Monday, 2 5 mg rest of days for now     · INR in AM

## 2018-04-11 NOTE — PHYSICAL THERAPY NOTE
Physical Therapy Evaluation    Patient Name: Francia Woodward    SFNAV'N Date: 4/11/2018     Problem List  Patient Active Problem List   Diagnosis    Seizure disorder ([facility] )    Hyperlipemia    Paroxysmal atrial fibrillation (HCC)    Essential hypertension    Urinary tract infection    Dementia    Fall    MOE (acute kidney injury) ([facility] )    Hyperbilirubinemia        Past Medical History  Past Medical History:   Diagnosis Date    A-fib [facility])     Ambulatory dysfunction     Anemia     Aphasia     Falls frequently     GERD (gastroesophageal reflux disease)     Hyperlipidemia     Hypertension     Muscle weakness     Seizures (HCC)     Stroke ([facility] )     T6 vertebral fracture (HCC)         Past Surgical History  Past Surgical History:   Procedure Laterality Date    [place] OPEN RX FEMUR FX+INTRAMED PRISCILLA Left 7/25/2016    Procedure: INSERTION NAIL IM FEMUR ANTEGRADE (TROCHANTERIC); Surgeon: Ronald Calvo MD;  Location:  MAIN OR;  Service: Orthopedics    WRIST FRACTURE SURGERY             04/11/18 1305   Note Type   Note type Eval only   Pain Assessment   Pain Assessment 0-10   Pain Score 8   Pain Type Acute pain   Pain Location Generalized   Hospital Pain Intervention(s) Ambulation/increased activity;Repositioned   Response to Interventions pt unable to localize pain although she pointed to L hip and abdomen; increase in pain with movement    Home Living   Type of Home SNF   Home Layout Able to live on main level with bedroom/bathroom   [address]; Wheelchair-manual   Additional Comments pt able to report she lives in a nursing home   Prior Function   Level of [place] Needs assistance with IADLs; Needs assistance with ADLs and functional mobility   Lives With Facility staff   ADL Assistance Needs assistance   IADLs Needs assistance   Falls in the last 6 months 1 to 4   Comments pt reports she was trying to use a w/c and also RW @ times; pt poor historian 2* dementia; per  pt's son states she was primarily w/c bound    Restrictions/Precautions   Weight Bearing Precautions Per Order No   Other Precautions Multiple lines; Fall Risk;Pain;Cognitive; Bed Alarm   Cognition   Arousal/Participation Alert   RLE Assessment   RLE Assessment X  (grossly 2-/5)   LLE Assessment   LLE Assessment X  (grossly 3-/5)   Coordination   Movements are Fluid and Coordinated 0   Coordination and Movement Description dysmetria    Sensation X   Light Touch   RLE Light Touch Impaired   RLE Light Touch Comments dec LT sensation RLE   LLE Light Touch Grossly intact   Bed Mobility   Rolling R 2  Maximal assistance   Additional items Assist x 2   Rolling L 2  Maximal assistance   Additional items Assist x 2   Supine to Sit Unable to assess   Additional Comments unable to assess sup<>sit 2* pain    Activity Tolerance   Activity Tolerance Patient limited by pain   Nurse Made Aware TRINO Alvarez   Assessment   Prognosis Guarded   Problem List Decreased strength;Decreased range of motion;Decreased endurance;Decreased mobility;Pain;Decreased cognition;Decreased safety awareness   Assessment Pt is 81 y/o female admitted s/p fall @ nursing home  Pt unable to state specifics of fall, except that she was trying to use the BR  Pt able to state she is in the hospital because she fell, and she knew she lived at a nursing home  Pt unable to clarify her PLOF, although son told  that pt was primarily w/c bound  Pt presents with LE weakness (R>L), impaired sensation RLE, pain  PMH includes stroke and it appears pt's R side was affected  Attempted to perform sup>sit transfer but pt unable 2* crying out in bed  Also found that pt was incontinent of urine; performed rolling L and R to clean her up; noted some redness developing on her buttocks and infomed nursing manager Shayy   At this time goals are set for rolling and sup<>Sit transfers; an OOB transfer goal will be added once transfers are formally assessed  Pt will benefit from skilled PT while in acute setting to improve bed mobility and prevent further functional decline  Recommend SNF D/C once medically cleared  Barriers to Discharge Decreased caregiver support   Goals   Patient Goals pt did not state any goals   STG Expiration Date 04/18/18   Short Term Goal #1 Pt will be able to roll L and R with min A x1  Pt will perform sup<>sit transfers with mod A x1  Plan   Treatment/Interventions Functional transfer training;LE strengthening/ROM; Therapeutic exercise; Endurance training;Patient/family training;Equipment eval/education; Bed mobility   PT Frequency 2-3x/wk   Recommendation   Recommendation Short-term skilled PT   Equipment Recommended Wheelchair   Barthel Index   Feeding 5   Bathing 0   Grooming Score 0   Dressing Score 5   Bladder Score 0   Bowels Score 0   Toilet Use Score 5   Transfers (Bed/Chair) Score 0   Mobility (Level Surface) Score 0   Stairs Score 0   Barthel Index Score 15

## 2018-04-11 NOTE — SOCIAL WORK
CM met with the Pt at bedside who presents as confused, CM spoke with Pt's son Yonathan Mejia via telephone to explain the CM role and discuss any potential D/C needs  Yonathan Mejia states Pt is a resident at [facility] in [facility]  Pt is primarily W/C bound at baseline, requires assistance with ADL's  Meals and medications are provided by facility  Pt's son Yonathan Mejia prefers [facility] at D/C and would not prefer any STR facility  CM left message with [address] staff to discuss Pt's baseline function and ability to return  Pt's son Yonathan Mejia is POA and previously provided documentation

## 2018-04-11 NOTE — ASSESSMENT & PLAN NOTE
· Likely mechanical  No evidence of acute injury on imaging of CT head, CT C Spine, CT L spine or CT A/P  Patient does not recall events prior to fall however patient does have dementia and expressive aphasia secondary to previous stroke     · Obtain PT/OT  · Fall precautions  · Bed alarm

## 2018-04-11 NOTE — PLAN OF CARE
Problem: Prexisting or High Potential for Compromised Skin Integrity  Goal: Skin integrity is maintained or improved  INTERVENTIONS:  - Assess and monitor skin integrity  - Assess and monitor nutrition and hydration status  - Monitor labs (i e  albumin)  - Assess for incontinence   - Turn and reposition patient  - Assist with mobility/ambulation  - Relieve pressure over bony prominences  - Avoid friction and shearing  - Provide appropriate hygiene as needed including keeping skin clean and dry  - Evaluate need for skin moisturizer/barrier cream  - Collaborate with interdisciplinary team (i e  Nutrition, Rehabilitation, etc )   - Patient/family teaching   Outcome: Progressing      Problem: PAIN - ADULT  Goal: Verbalizes/displays adequate comfort level or baseline comfort level  Interventions:  - Encourage patient to monitor pain and request assistance  - Assess pain using appropriate pain scale  - Administer analgesics based on type and severity of pain and evaluate response  - Implement non-pharmacological measures as appropriate and evaluate response  - Consider cultural and social influences on pain and pain management  - Notify physician/advanced practitioner if interventions unsuccessful or patient reports new pain  Outcome: Progressing      Problem: INFECTION - ADULT  Goal: Absence or prevention of progression during hospitalization  INTERVENTIONS:  - Assess and monitor for signs and symptoms of infection  - Monitor lab/diagnostic results  - Monitor all insertion sites, i e  indwelling lines, tubes, and drains  - Monitor endotracheal (as able) and nasal secretions for changes in amount and color  - [place] appropriate cooling/warming therapies per order  - Administer medications as ordered  - Instruct and encourage patient and family to use good hand hygiene technique  - Identify and instruct in appropriate isolation precautions for identified infection/condition  Outcome: Progressing      Problem: SAFETY ADULT  Goal: Patient will remain free of falls  INTERVENTIONS:  - Assess patient frequently for physical needs  -  Identify cognitive and physical deficits and behaviors that affect risk of falls    -  [place] fall precautions as indicated by assessment   - Educate patient/family on patient safety including physical limitations  - Instruct patient to call for assistance with activity based on assessment  - Modify environment to reduce risk of injury  - Consider OT/PT consult to assist with strengthening/mobility  Outcome: Progressing    Goal: Maintain or return to baseline ADL function  INTERVENTIONS:  -  Assess patient's ability to carry out ADLs; assess patient's baseline for ADL function and identify physical deficits which impact ability to perform ADLs (bathing, care of mouth/teeth, toileting, grooming, dressing, etc )  - Assess/evaluate cause of self-care deficits   - Assess range of motion  - Assess patient's mobility; develop plan if impaired  - Assess patient's need for assistive devices and provide as appropriate  - Encourage maximum independence but intervene and supervise when necessary  ¯ Involve family in performance of ADLs  ¯ Assess for home care needs following discharge   ¯ Request OT consult to assist with ADL evaluation and planning for discharge  ¯ Provide patient education as appropriate  Outcome: Progressing    Goal: Maintain or return mobility status to optimal level  INTERVENTIONS:  - Assess patient's baseline mobility status (ambulation, transfers, stairs, etc )    - Identify cognitive and physical deficits and behaviors that affect mobility  - Identify mobility aids required to assist with transfers and/or ambulation (gait belt, sit-to-stand, lift, walker, cane, etc )  - [place] fall precautions as indicated by assessment  - Record patient progress and toleration of activity level on Mobility SBAR; progress patient to next Phase/Stage  - Instruct patient to call for assistance with activity based on assessment  - Request Rehabilitation consult to assist with strengthening/weightbearing, etc   Outcome: Progressing      Problem: DISCHARGE PLANNING  Goal: Discharge to home or other facility with appropriate resources  INTERVENTIONS:  - Identify barriers to discharge w/patient and caregiver  - Arrange for needed discharge resources and transportation as appropriate  - Identify discharge learning needs (meds, wound care, etc )  - Arrange for interpretive services to assist at discharge as needed  - Refer to Case Management Department for coordinating discharge planning if the patient needs post-hospital services based on physician/advanced practitioner order or complex needs related to functional status, cognitive ability, or social support system  Outcome: Progressing      Problem: Knowledge Deficit  Goal: Patient/family/caregiver demonstrates understanding of disease process, treatment plan, medications, and discharge instructions  Complete learning assessment and assess knowledge base    Interventions:  - Provide teaching at level of understanding  - Provide teaching via preferred learning methods  Outcome: Progressing

## 2018-04-11 NOTE — ASSESSMENT & PLAN NOTE
· Bilirubin 1 20 today, does not appear to be chronic upon chart review  CT done on admission showing no evidence of any acute biliary etiology  Abdominal exam benign      · Repeat CMP in AM  · Monitor

## 2018-04-11 NOTE — ASSESSMENT & PLAN NOTE
· Urine culture showing gram negative rods, follow up end point end sensitivities  · Continue with IV ancef for now pending sensitives  · Mild leukocytosis resolved, afebrile     · Denies any urinary symptoms  · Monitor

## 2018-04-11 NOTE — PROGRESS NOTES
[facility]    Progress Note - Mounarichard Rm 1935, 80 y o  female MRN: [id number]    Unit/Bed#: -[facility] Encounter: [id number]    Primary Care Provider: Tarik Smith MD   Date and time admitted to hospital: 4/10/2018  9:56 AM    * Fall   Assessment & Plan    · Likely mechanical  No evidence of acute injury on imaging of CT head, CT C Spine, CT L spine or CT A/P  Patient does not recall events prior to fall however patient does have dementia and expressive aphasia secondary to previous stroke  · Obtain PT/OT  · Fall precautions  · Bed alarm        Urinary tract infection   Assessment & Plan    · Urine culture showing gram negative rods, follow up end point end sensitivities  · Continue with IV ancef for now pending sensitives  · Mild leukocytosis resolved, afebrile  · Denies any urinary symptoms  · Monitor           MOE (acute kidney injury) ([facility] [address] )   Assessment & Plan    · Resolved s/p IVF  · Will discontinue IVF at this time  · Monitor          Paroxysmal atrial fibrillation (HCC)   Assessment & Plan    · Rate controlled, Continue lopressor 50 mg q8h (home dose) and coumadin  INR slightly subtherapeutic on admission  Repeat INR pending today  · Continue home warfarin dose 1 mg q Monday, 2 5 mg rest of days for now  · INR in AM          Hyperbilirubinemia   Assessment & Plan    · Bilirubin 1 20 today, does not appear to be chronic upon chart review  CT done on admission showing no evidence of any acute biliary etiology  Abdominal exam benign      · Repeat CMP in AM  · Monitor        Seizure disorder (HCC)   Assessment & Plan    · Continue keppra 750 mg BID        Essential hypertension   Assessment & Plan    · Blood pressures are acceptable  · Continue lopressor at home dose of 50 mg q8h        Hyperlipemia   Assessment & Plan    · Continue statin        Dementia   Assessment & Plan    · Unknown baseline but seems alert to self and place, patient does have a history of expressive aphasia secondary to prior stroke  · Fall precautions  Pharmacologic: Warfarin (Coumadin)  Mechanical VTE Prophylaxis in Place: Yes    Patient Centered Rounds: I have performed bedside rounds with nursing staff today  Jeannie    Discussions with Specialists or Other Care Team Provider:  Nursing, case management    Education and Discussions with Family / Patient:  Patient and son Kin Flatten over the phone    Time Spent for Care: 30 minutes  More than 50% of total time spent on counseling and coordination of care as described above  Current Length of Stay: 0 day(s)    Current Patient Status: Inpatient   Certification Statement: The patient will continue to require additional inpatient hospital stay due to Intravenous antibiotics, follow up final culture/sensitivity, PT/OT consult  Discharge Plan / Estimated Discharge Date:  Pending final urine culture/sensitivity, PT/OT consult  Patient lives at [facility] in [facility] which is independent living      Code Status: Level 3 - DNAR and DNI      Subjective:   Patient offers no complaints  Denies any urinary symptoms other than incontinence which is not new  Denies any fevers or chills  Denies any pain  Denies any abdominal pain  Objective:     Vitals:   Temp (24hrs), Av 3 °F (36 8 °C), Min:98 °F (36 7 °C), Max:98 5 °F (36 9 °C)    HR:  [68-97] 91  Resp:  [18-23] 18  BP: (123-160)/(60-96) 154/66  SpO2:  [92 %-97 %] 94 %  Body mass index is 27 38 kg/m²  Input and Output Summary (last 24 hours): Intake/Output Summary (Last 24 hours) at 18 1134  Last data filed at 18 0835   Gross per 24 hour   Intake             1970 ml   Output              600 ml   Net             1370 ml       Physical Exam:     Physical Exam   Constitutional: No distress  HENT:   Head: Normocephalic  Mouth/Throat: Abnormal dentition (poor dentition)  Eyes: Pupils are equal, round, and reactive to light  Neck: Normal range of motion  Cardiovascular: Normal rate  An irregularly irregular rhythm present  No murmur heard  Pulmonary/Chest: Effort normal and breath sounds normal    Abdominal: Soft  Bowel sounds are normal  She exhibits no distension  There is no tenderness  Musculoskeletal: Normal range of motion  She exhibits no edema  Neurological: She is alert  Oriented to person and place  Baseline expressive aphasia  Skin: Skin is warm and dry  Psychiatric: She has a normal mood and affect  Nursing note and vitals reviewed        Additional Data:     Labs:      Results from last 7 days  Lab Units 04/11/18  0504 04/10/18  1047   WBC Thousand/uL 6 19 10 62*   HEMOGLOBIN g/dL 12 0 13 6   HEMATOCRIT % 35 6 40 4   PLATELETS Thousands/uL 157 438*   NEUTROS PCT %  --  69   LYMPHS PCT %  --  22   MONOS PCT %  --  7   EOS PCT %  --  2       Results from last 7 days  Lab Units 04/11/18  0504   SODIUM mmol/L 139   POTASSIUM mmol/L 4 0   CHLORIDE mmol/L 111*   CO2 mmol/L 22   BUN mg/dL 9   CREATININE mg/dL 0 42*   CALCIUM mg/dL 8 1   TOTAL PROTEIN g/dL 6 0*   BILIRUBIN TOTAL mg/dL 1 20*   ALK PHOS U/L 87   ALT U/L 10*   AST U/L 12   GLUCOSE RANDOM mg/dL 102       Results from last 7 days  Lab Units 04/10/18  1100   INR  1 93*         Recent Cultures (last 7 days):       Results from last 7 days  Lab Units 04/10/18  1143   URINE CULTURE  >100,000 cfu/ml Gram Negative Arnold resembling Escherichia coli*       Last 24 Hours Medication List:     Current Facility-Administered Medications:  acetaminophen 650 mg Oral Q6H PRN Rajan Vazquez PA-C    calcium carbonate-vitamin D 1 tablet Oral Daily Rena Yost PA-C    cefazolin 1,000 mg Intravenous Q12H Rena oYst PA-C Last Rate: 1,000 mg (04/11/18 0431)   lactulose 10 g Oral Daily Rena Yost PA-C    levETIRAcetam 750 mg Oral BID Rena Yost PA-C    magnesium hydroxide 30 mL Oral Daily PRN Rajan Vazquez PA-C    metoprolol tartrate 50 mg Oral Q8H Rena Yost PA-C    ondansetron 4 mg Intravenous Q6H PRN Tanner Owen PA-C    [START ON 4/16/2018] warfarin 1 mg Oral Once per day on Mon Rena Yost PA-C    warfarin 2 5 mg Oral Once per day on Sun Tue Wed Thu Fri Sat Tanner Owen PA-C         Today, Patient Was Seen By: THERESA Morales    ** Please Note: Dragon 360 Dictation voice to text software may have been used in the creation of this document   **

## 2018-04-11 NOTE — PLAN OF CARE
Problem: PHYSICAL THERAPY ADULT  Goal: Performs mobility at highest level of function for planned discharge setting  See evaluation for individualized goals  Treatment/Interventions: Functional transfer training, LE strengthening/ROM, Therapeutic exercise, Endurance training, Patient/family training, Equipment eval/education, Bed mobility  Equipment Recommended: Wheelchair       See flowsheet documentation for full assessment, interventions and recommendations  Outcome: Progressing  Prognosis: Guarded  Problem List: Decreased strength, Decreased range of motion, Decreased endurance, Decreased mobility, Pain, Decreased cognition, Decreased safety awareness  Assessment: Pt is 79 y/o female admitted s/p fall @ nursing home  Pt unable to state specifics of fall, except that she was trying to use the BR  Pt able to state she is in the hospital because she fell, and she knew she lived at a nursing home  Pt unable to clarify her PLOF, although son told  that pt was primarily w/c bound  Pt presents with LE weakness (R>L), impaired sensation RLE, pain  Attempted to perform sup>sit transfer but pt unable 2* crying out in bed  Also found that pt was incontinent of urine; performed rolling L and R to clean her up; noted some redness developing on her buttocks and infomed nursing manager Shayy  At this time goals are set for rolling and sup<>Sit transfers; an OOB transfer goal will be added once transfers are formally assessed  Pt will benefit from skilled PT while in acute setting to improve bed mobility and prevent further functional decline  Recommend SNF D/C once medically cleared  Barriers to Discharge: Decreased caregiver support     Recommendation: (S) Short-term skilled PT          See flowsheet documentation for full assessment

## 2018-04-11 NOTE — CASE MANAGEMENT
Continued Stay Review  Observation 04/10/2018 @ 1500, converted to [address] Admission 04/11/2018 @ 1116, due to further diagnostic workup, requiring at least 2 midnight stay  Date: 04/11/2018 04/11/18 1116  Inpatient Admission Once     Transfer Service: General Medicine       Question Answer Comment   Admitting Physician [facility], [place]    Level of Care Med Surg    Estimated length of stay More than 2 Midnights    Certification I certify that inpatient services are medically necessary for this patient for a duration of greater than two midnights  See H&P and MD Progress Notes for additional information about the patient's course of treatment  Vital Signs: /66   Pulse 91   Temp 98 4 °F (36 9 °C) (Oral)   Resp 18   Ht 5' 3" (1 6 m)   Wt 70 1 kg (154 lb 8 7 oz)   LMP  (LMP Unknown)   SpO2 94%   BMI 27 38 kg/m²     Medications:   Scheduled Meds:   Current Facility-Administered Medications:  acetaminophen 650 mg Oral Q6H PRN    calcium carbonate-vitamin D 1 tablet Oral Daily    cefazolin 1,000 mg Intravenous Q12H Last Rate: 1,000 mg (04/11/18 0431)   lactulose 10 g Oral Daily    levETIRAcetam 750 mg Oral BID    magnesium hydroxide 30 mL Oral Daily PRN    metoprolol tartrate 50 mg Oral Q8H    ondansetron 4 mg Intravenous Q6H PRN    [START ON 4/16/2018] warfarin 1 mg Oral Once per day on Mon    warfarin 2 5 mg Oral Once per day on Sun Tue Wed Thu Fri Sat      Abnormal Labs/Diagnostic Results:     Age/Sex: 80 y o  female     Assessment/Plan:    * Fall   Assessment & Plan     · Likely mechanical  No evidence of acute injury on imaging of CT head, CT C Spine, CT L spine or CT A/P  Patient does not recall events prior to fall however patient does have dementia and expressive aphasia secondary to previous stroke     · Obtain PT/OT  · Fall precautions  · Bed alarm          Urinary tract infection   Assessment & Plan     · Urine culture showing gram negative rods, follow up end point end sensitivities  · Continue with IV ancef for now pending sensitives  · Mild leukocytosis resolved, afebrile  · Denies any urinary symptoms  · Monitor              MOE (acute kidney injury) ([facility] [address] )   Assessment & Plan     · Resolved s/p IVF  · Will discontinue IVF at this time  · Monitor             Paroxysmal atrial fibrillation (HCC)   Assessment & Plan     · Rate controlled, Continue lopressor 50 mg q8h (home dose) and coumadin  INR slightly subtherapeutic on admission  Repeat INR pending today  · Continue home warfarin dose 1 mg q Monday, 2 5 mg rest of days for now  · INR in AM            Hyperbilirubinemia   Assessment & Plan     · Bilirubin 1 20 today, does not appear to be chronic upon chart review  CT done on admission showing no evidence of any acute biliary etiology  Abdominal exam benign  · Repeat CMP in AM  · Monitor          Seizure disorder (HCC)   Assessment & Plan     · Continue keppra 750 mg BID          Essential hypertension   Assessment & Plan     · Blood pressures are acceptable  · Continue lopressor at home dose of 50 mg q8h          Hyperlipemia   Assessment & Plan     · Continue statin          Dementia   Assessment & Plan     · Unknown baseline but seems alert to self and place, patient does have a history of expressive aphasia secondary to prior stroke  · Fall precautions               Pharmacologic: Warfarin (Coumadin)  Mechanical VTE Prophylaxis in Place: Yes     Current Length of Stay: 0 day(s)     Current Patient Status: Inpatient   Certification Statement: The patient will continue to require additional inpatient hospital stay due to Intravenous antibiotics, follow up final culture/sensitivity, PT/OT consult      Discharge Plan / Estimated Discharge Date:  Pending final urine culture/sensitivity, PT/OT consult    Patient lives at [facility] in [facility] which is independent living

## 2018-04-11 NOTE — ASSESSMENT & PLAN NOTE
· Unknown baseline but seems alert to self and place, patient does have a history of expressive aphasia secondary to prior stroke  · Fall precautions

## 2018-04-12 LAB
ALBUMIN SERPL BCP-MCNC: 2.6 G/DL (ref 3.5–5)
ALP SERPL-CCNC: 93 U/L (ref 46–116)
ALT SERPL W P-5'-P-CCNC: 9 U/L (ref 12–78)
ANION GAP SERPL CALCULATED.3IONS-SCNC: 7 MMOL/L (ref 4–13)
AST SERPL W P-5'-P-CCNC: 14 U/L (ref 5–45)
BACTERIA UR CULT: ABNORMAL
BACTERIA UR CULT: ABNORMAL
BASOPHILS # BLD AUTO: 0.01 THOUSANDS/ΜL (ref 0–0.1)
BASOPHILS NFR BLD AUTO: 0 % (ref 0–1)
BILIRUB SERPL-MCNC: 1.41 MG/DL (ref 0.2–1)
BUN SERPL-MCNC: 9 MG/DL (ref 5–25)
CALCIUM SERPL-MCNC: 8.7 MG/DL
CHLORIDE SERPL-SCNC: 109 MMOL/L (ref 100–108)
CO2 SERPL-SCNC: 24 MMOL/L (ref 21–32)
CREAT SERPL-MCNC: 0.38 MG/DL (ref 0.6–1.3)
EOSINOPHIL # BLD AUTO: 0.17 THOUSAND/ΜL (ref 0–0.61)
EOSINOPHIL NFR BLD AUTO: 3 % (ref 0–6)
ERYTHROCYTE [DISTWIDTH] IN BLOOD BY AUTOMATED COUNT: 13.6 % (ref 11.6–15.1)
GFR SERPL CREATININE-BSD FRML MDRD: 99 ML/MIN/1.73SQ M
GLUCOSE SERPL-MCNC: 93 MG/DL (ref 65–140)
HCT VFR BLD AUTO: 36.8 % (ref 34.8–46.1)
HGB BLD-MCNC: 12.5 G/DL (ref 11.5–15.4)
INR PPP: 2.08 (ref 0.86–1.16)
LYMPHOCYTES # BLD AUTO: 1.96 THOUSANDS/ΜL (ref 0.6–4.47)
LYMPHOCYTES NFR BLD AUTO: 34 % (ref 14–44)
MCH RBC QN AUTO: 29.2 PG (ref 26.8–34.3)
MCHC RBC AUTO-ENTMCNC: 34 G/DL (ref 31.4–37.4)
MCV RBC AUTO: 86 FL (ref 82–98)
MONOCYTES # BLD AUTO: 0.46 THOUSAND/ΜL (ref 0.17–1.22)
MONOCYTES NFR BLD AUTO: 8 % (ref 4–12)
NEUTROPHILS # BLD AUTO: 3.23 THOUSANDS/ΜL (ref 1.85–7.62)
NEUTS SEG NFR BLD AUTO: 55 % (ref 43–75)
NRBC BLD AUTO-RTO: 0 /100 WBCS
PLATELET # BLD AUTO: 155 THOUSANDS/UL (ref 149–390)
PMV BLD AUTO: 9.9 FL (ref 8.9–12.7)
POTASSIUM SERPL-SCNC: 3.8 MMOL/L (ref 3.5–5.3)
PROT SERPL-MCNC: 6.4 G/DL (ref 6.4–8.2)
PROTHROMBIN TIME: 23.6 SECONDS (ref 12.1–14.4)
RBC # BLD AUTO: 4.28 MILLION/UL (ref 3.81–5.12)
SODIUM SERPL-SCNC: 140 MMOL/L (ref 136–145)
WBC # BLD AUTO: 5.84 THOUSAND/UL (ref 4.31–10.16)

## 2018-04-12 PROCEDURE — G8987 SELF CARE CURRENT STATUS: HCPCS

## 2018-04-12 PROCEDURE — 85025 COMPLETE CBC W/AUTO DIFF WBC: CPT | Performed by: NURSE PRACTITIONER

## 2018-04-12 PROCEDURE — 80053 COMPREHEN METABOLIC PANEL: CPT | Performed by: NURSE PRACTITIONER

## 2018-04-12 PROCEDURE — 97167 OT EVAL HIGH COMPLEX 60 MIN: CPT

## 2018-04-12 PROCEDURE — 97530 THERAPEUTIC ACTIVITIES: CPT

## 2018-04-12 PROCEDURE — 99232 SBSQ HOSP IP/OBS MODERATE 35: CPT | Performed by: NURSE PRACTITIONER

## 2018-04-12 PROCEDURE — G8988 SELF CARE GOAL STATUS: HCPCS

## 2018-04-12 PROCEDURE — 85610 PROTHROMBIN TIME: CPT | Performed by: NURSE PRACTITIONER

## 2018-04-12 RX ORDER — DOXYCYCLINE HYCLATE 100 MG/1
CAPSULE ORAL
Status: DISPENSED
Start: 2018-04-12 | End: 2018-04-13

## 2018-04-12 RX ORDER — SODIUM CHLORIDE 9 MG/ML
75 INJECTION, SOLUTION INTRAVENOUS CONTINUOUS
Status: DISCONTINUED | OUTPATIENT
Start: 2018-04-12 | End: 2018-04-13 | Stop reason: HOSPADM

## 2018-04-12 RX ORDER — DOXYCYCLINE HYCLATE 100 MG/1
100 CAPSULE ORAL EVERY 12 HOURS SCHEDULED
Status: DISCONTINUED | OUTPATIENT
Start: 2018-04-12 | End: 2018-04-13 | Stop reason: HOSPADM

## 2018-04-12 RX ADMIN — LEVETIRACETAM 750 MG: 750 TABLET ORAL at 18:31

## 2018-04-12 RX ADMIN — SODIUM CHLORIDE 75 ML/HR: 0.9 INJECTION, SOLUTION INTRAVENOUS at 14:26

## 2018-04-12 RX ADMIN — WARFARIN SODIUM 2.5 MG: 2.5 TABLET ORAL at 18:30

## 2018-04-12 RX ADMIN — METOPROLOL TARTRATE 50 MG: 50 TABLET ORAL at 00:02

## 2018-04-12 RX ADMIN — METOPROLOL TARTRATE 50 MG: 50 TABLET ORAL at 08:18

## 2018-04-12 RX ADMIN — LEVETIRACETAM 750 MG: 750 TABLET ORAL at 08:18

## 2018-04-12 RX ADMIN — CEFAZOLIN SODIUM 1000 MG: 1 SOLUTION INTRAVENOUS at 04:54

## 2018-04-12 RX ADMIN — CALCIUM CARBONATE-VITAMIN D TAB 500 MG-200 UNIT 1 TABLET: 500-200 TAB at 08:18

## 2018-04-12 RX ADMIN — METOPROLOL TARTRATE 50 MG: 50 TABLET ORAL at 18:30

## 2018-04-12 RX ADMIN — LACTULOSE 10 G: 20 SOLUTION ORAL at 08:18

## 2018-04-12 RX ADMIN — DOXYCYCLINE HYCLATE 100 MG: 100 CAPSULE, GELATIN COATED ORAL at 21:48

## 2018-04-12 NOTE — ASSESSMENT & PLAN NOTE
· Likely mechanical  No evidence of acute injury on imaging of CT head, CT C Spine, CT L spine or CT A/P  Patient does not recall events prior to fall however patient does have dementia and expressive aphasia secondary to previous stroke  · PT recommending [facility] however son does not want patient to go to STR due to financial reasons  [facility] and [facility] are [facility] to [facility] her back at this time     · Fall precautions  · Bed alarm

## 2018-04-12 NOTE — SOCIAL WORK
CM spoke with nursing staff Katie Niño from Pt's [facility] via telephone to discuss Pt's prior level of function  Pt requires assist x 1 with R/W for ambulation approx  15ft  Pt primarily w/c bound and requires assist x 1 for ADL's  CM spoke with RONA Corado from   C  who states Pt can return at current level of function and is requesting referral to [facility] for PT/OT

## 2018-04-12 NOTE — PLAN OF CARE
Problem: OCCUPATIONAL THERAPY ADULT  Goal: Performs self-care activities at highest level of function for planned discharge setting  See evaluation for individualized goals  Treatment Interventions: ADL retraining, Functional transfer training, Endurance training, Patient/family training          See flowsheet documentation for full assessment, interventions and recommendations  Limitation: Decreased ADL status, Decreased UE ROM, Decreased UE strength, Decreased Safe judgement during ADL, Decreased cognition, Decreased endurance  Prognosis: Fair  Assessment: Pt is a 80 y o  female seen for OT evaluation s/p admit to [facility] on 4/10/2018 s/p Fall  Comorbidities affecting pt's functional performance at time of assessment include: HTN, limited communication and dementia  Personal factors affecting pt at time of IE include:difficulty performing ADLS and difficulty performing IADLS   Prior to admission, per chart review pt was receiving assist with ADLs, xfers, and TA for IADL fxn  Pt able to report that she was using a wheelchair and that the facility assists to provide meds, meals, and help for ADL fxn  Upon evaluation: Pt requires Mod A x 1 to roll and mod A x 2 for supine<>Sit transition 2* the following deficits impacting occupational performance: weakness, decreased ROM, decreased strength, decreased balance, decreased tolerance, impaired memory, impaired problem solving, decreased safety awareness and increased pain  Pt to benefit from continued skilled OT tx while in the hospital to address deficits as defined above and maximize level of functional independence w ADL's and functional mobility  Occupational Performance areas to address include: bathing/shower, dressing and functional mobility  Upon discharge, it is anticipated that pt will continue to require assistance for ADLs and transfers   However at this time, pt will benefit from skilled OT services to max indep and dec overall burden of care  At time of d/c, pt may benefit from short term rehab vs return to facility Pending clarification on assist level available and provided at [employer] and family wishes        OT Discharge Recommendation: Short Term Rehab (vs return to facility)

## 2018-04-12 NOTE — ASSESSMENT & PLAN NOTE
· Rate controlled, Continue lopressor 50 mg q8h (home dose) and coumadin  · INR 2 08 today  · Continue home warfarin dose 1 mg q Monday, 2 5 mg rest of days for now     · INR in AM

## 2018-04-12 NOTE — ASSESSMENT & PLAN NOTE
· Bilirubin 1 41 today, does not appear to be chronic upon chart review  CT done on admission showing no evidence of any acute biliary etiology  Abdominal exam benign  ? Dehydration, will give some IVF     · Repeat CMP in AM  · Monitor

## 2018-04-12 NOTE — OCCUPATIONAL THERAPY NOTE
[address]  Evaluation     Patient Name: Shell Odell  YJBYI'V Date: 4/12/2018  Problem List  Patient Active Problem List   Diagnosis    Seizure disorder ([facility] )    Hyperlipemia    Paroxysmal atrial fibrillation (HCC)    Essential hypertension    Urinary tract infection    Dementia    Fall    [facility]E ([facility]) [facility] )    Hyperbilirubinemia     Past Medical History  Past Medical History:   Diagnosis Date    A-fib [facility])     Ambulatory dysfunction     Anemia     Aphasia     Falls frequently     GERD (gastroesophageal reflux disease)     Hyperlipidemia     Hypertension     Muscle weakness     Seizures (HCC)     Stroke ([facility] )     T6 vertebral fracture (HCC)      Past Surgical History  Past Surgical History:   Procedure Laterality Date    OR OPEN RX FEMUR FX+INTRAMED PRISCILLA Left 7/25/2016    Procedure: INSERTION NAIL IM FEMUR ANTEGRADE (TROCHANTERIC); Surgeon: Galileo Cabrera MD;  Location: [facility] OR;  Service: Orthopedics    WRIST FRACTURE SURGERY          04/12/18 0900   Note Type   Note type Eval/Treat   Restrictions/Precautions   Weight Bearing Precautions Per Order No   Other Precautions Multiple lines; Fall Risk;Pain;Cognitive; Bed Alarm;Seizure   Home Living   Type of Home Apartment;SNF   Home Layout One level;Ramped entrance   Bathroom Shower/Tub Walk-in shower   [address]   Additional Comments Pt self reporting she lives in SNF in assisted living with shower stall "across the davison"   Prior Function   Level of [place] Needs assistance with ADLs and functional mobility; Needs assistance with IADLs   Lives With Facility staff   Receives Help From Personal care attendant   ADL Assistance Needs assistance   IADLs Needs assistance   Falls in the last 6 months 1 to 4   Vocational Retired   Comments PTA, pt was living at [facility] and receiving assistance for ADL fxn and IADL fxn of med management, meal prep, and finance management   Pt reports she needs assistance with dressing and bathing  Per chart review pt is w/c bound at baseline   Psychosocial   Psychosocial (WDL) WDL   ADL   UB Bathing Assistance 3  Moderate Assistance   LB Bathing Assistance 2  Maximal Assistance   UB Dressing Assistance 3  Moderate [address] 1  Total [address]  1  Total [address] 1  Total Assistance   Additional Comments Pt completed rolling L<>R in bed for hygiene  Pt with inc indep rolling to R with use of L UE support and A to position R LE, pt utilizing bed rails  Pt with inc difficulty rolling to L 2* R UE and LE weakness  Pt completed at Mod A level x 1, pt agreeable to transition supine<>sit, pt req Mod A x 2 for management to EOB  Transfers   Sit to Stand Unable to assess   Stand to Sit Unable to assess   Activity Tolerance   Activity Tolerance Patient tolerated treatment well   Medical Staff Made Aware TRINO Jauregui   RUMELONY Assessment   RUE Assessment X   LUE Assessment   LUE Assessment X   Cognition   Overall Cognitive Status Impaired   Arousal/Participation Alert; Cooperative   Attention Within functional limits   Orientation Level Oriented to person;Oriented to place   Memory Decreased recall of precautions;Decreased recall of recent events;Decreased short term memory;Decreased long term memory;Decreased recall of biographical information   Following Commands Follows one step commands with increased time or repetition   Comments Pt oriented to month, disoriented to year  Pt pleasant and cooperative, willing to complete bed mobility and supine<>Sit transitions  Pt req step by step commands with inc time for processing and repetition at times, per chart review  Pt with baseline dementia   Assessment   Limitation Decreased ADL status; Decreased UE ROM; Decreased UE strength;Decreased Safe judgement during ADL;Decreased cognition;Decreased endurance   Prognosis Fair   Assessment Pt is a 80 y o  female seen for OT evaluation s/p admit to [facility] on 4/10/2018 s/p Fall  Comorbidities affecting pt's functional performance at time of assessment include: HTN, limited communication and dementia  Personal factors affecting pt at time of IE include:difficulty performing ADLS and difficulty performing IADLS   Prior to admission, per chart review pt was receiving assist with ADLs, xfers, and TA for IADL fxn  Pt able to report that she was using a wheelchair and that the facility assists to provide meds, meals, and help for ADL fxn  Upon evaluation: Pt requires Mod A x 1 to roll and mod A x 2 for supine<>Sit transition 2* the following deficits impacting occupational performance: weakness, decreased ROM, decreased strength, decreased balance, decreased tolerance, impaired memory, impaired problem solving, decreased safety awareness and increased pain  Pt to benefit from continued skilled OT tx while in the hospital to address deficits as defined above and maximize level of functional independence w ADL's and functional mobility  Occupational Performance areas to address include: bathing/shower, dressing and functional mobility  Upon discharge, it is anticipated that pt will continue to require assistance for ADLs and transfers  However at this time, pt will benefit from skilled OT services to max indep and dec overall burden of care  At time of d/c, pt may benefit from short term rehab vs return to facility Pending clarification on assist level available and provided at [facility] and family wishes  Plan   Treatment Interventions ADL retraining;Functional transfer training; Endurance training;Patient/family training   Goal Expiration Date 04/22/18   OT Frequency 2-3x/wk   Additional Treatment Session   Start Time 0900   End Time 0945   Treatment Assessment Pt tolerated 20 min sitting EOB following supine>sit transition  Initially, pt extremely retropulsive, requiring assist of 2 to maintain  With verbal cues and personal conversation, pt req dec assist fading to S level sitting EOB with UEs positioned on her thighs  Pt tolerated 20 min total with no C/o pain when seated,   Recommendation   OT Discharge Recommendation Short Term Rehab  (vs return to facility)   Barthel Index   Feeding 5   Bathing 0   Grooming Score 5   Dressing Score 0   Bladder Score 0   Bowels Score 0   Toilet Use Score 0   Transfers (Bed/Chair) Score 5   Mobility (Level Surface) Score 0   Stairs Score 0   Barthel Index Score 15   Modified [place] Scale   Modified La Scale 4      1  Pt will perform supine<>sit at mod A x 1 level  2  Pt will complete SPT to/from w/c at max/mod A x 1 level to dec burden of care  3   Pt will complete grooming and UB ADLs at min A level sitting upright, EOB

## 2018-04-12 NOTE — PROGRESS NOTES
[address] Internal Medicine  Progress Note - Leisa Knox 1935, 80 y o  female MRN: [id number]    Unit/Bed#: -[facility] Encounter: [id number]    Primary Care Provider: Chris Zarate MD   Date and time admitted to hospital: 4/10/2018  9:56 AM    * Fall   Assessment & Plan    · Likely mechanical  No evidence of acute injury on imaging of CT head, CT C Spine, CT L spine or CT A/P  Patient does not recall events prior to fall however patient does have dementia and expressive aphasia secondary to previous stroke  · PT recommending STR however son does not want patient to go to STR due to financial reasons  CM spoke with [facility] and they are able to accept her back at this time  · Fall precautions  · Bed alarm        Urinary tract infection   Assessment & Plan    · Urine culture showing gram negative rods, follow up end point end sensitivities  · Continue with IV ancef for now pending sensitives  · Continues to be afebrile without leukocytosis  · Denies any urinary symptoms  · Monitor           Paroxysmal atrial fibrillation (HCC)   Assessment & Plan    · Rate controlled, Continue lopressor 50 mg q8h (home dose) and coumadin  · INR 2 08 today  · Continue home warfarin dose 1 mg q Monday, 2 5 mg rest of days for now  · INR in AM          Hyperbilirubinemia   Assessment & Plan    · Bilirubin 1 41 today, does not appear to be chronic upon chart review  CT done on admission showing no evidence of any acute biliary etiology  Abdominal exam benign  ? Dehydration, will give some IVF     · Repeat CMP in AM  · Monitor        Seizure disorder (HCC)   Assessment & Plan    · Continue keppra 750 mg BID        Essential hypertension   Assessment & Plan    · Blood pressures are acceptable  · Continue lopressor at home dose of 50 mg q8h        Hyperlipemia   Assessment & Plan    · Continue statin        Dementia   Assessment & Plan    · Unknown baseline but seems alert to self and place, patient does have a history of expressive aphasia secondary to prior stroke  · Fall precautions  Pharmacologic: Warfarin (Coumadin)  Mechanical VTE Prophylaxis in Place: Yes    Patient Centered Rounds: I have performed bedside rounds with nursing staff today  Discussions with Specialists or Other Care Team Provider: nursing, case management     Education and Discussions with Family / Patient: Patient and son Dorian Garcia over the phone  Time Spent for Care: 30 minutes  More than 50% of total time spent on counseling and coordination of care as described above  Current Length of Stay: 1 day(s)    Current Patient Status: Inpatient   Certification Statement: The patient will continue to require additional inpatient hospital stay due to IV abx, follow up culture/sensitivities, IV fluids, monitor bilirubin  Discharge Plan / Estimated Discharge Date: Likely tomorrow pending final culture  Patient will go back to [facility]  Code Status: Level 3 - DNAR and DNI      Subjective:   Patient offers no complaints  Denies any urinary symptoms  Denies any nausea, vomiting  Denies any abdominal pain or tenderness  Objective:     Vitals:   Temp (24hrs), Av 1 °F (36 7 °C), Min:97 6 °F (36 4 °C), Max:98 6 °F (37 °C)    HR:  [74-84] 84  Resp:  [18] 18  BP: (130-136)/(65-73) 131/65  SpO2:  [92 %-93 %] 93 %  Body mass index is 27 38 kg/m²  Input and Output Summary (last 24 hours): Intake/Output Summary (Last 24 hours) at 18 1258  Last data filed at 18 1150   Gross per 24 hour   Intake              240 ml   Output                0 ml   Net              240 ml       Physical Exam:     Physical Exam   Constitutional: No distress  HENT:   Head: Normocephalic  Mouth/Throat: Abnormal dentition (poor dentition)  Eyes: Pupils are equal, round, and reactive to light  Neck: Normal range of motion  Cardiovascular: Normal rate and regular rhythm      No murmur heard   Pulmonary/Chest: Effort normal and breath sounds normal    Abdominal: Soft  Bowel sounds are normal  She exhibits no distension  There is no tenderness  Musculoskeletal: Normal range of motion  Neurological: She is alert  Expressive aphasia, baseline  Alert to person and place  Skin: Skin is warm and dry  Psychiatric: She has a normal mood and affect  Nursing note and vitals reviewed        Additional Data:     Labs:      Results from last 7 days  Lab Units 04/12/18  0522   WBC Thousand/uL 5 84   HEMOGLOBIN g/dL 12 5   HEMATOCRIT % 36 8   PLATELETS Thousands/uL 155   NEUTROS PCT % 55   LYMPHS PCT % 34   MONOS PCT % 8   EOS PCT % 3       Results from last 7 days  Lab Units 04/12/18  0522   SODIUM mmol/L 140   POTASSIUM mmol/L 3 8   CHLORIDE mmol/L 109*   CO2 mmol/L 24   BUN mg/dL 9   CREATININE mg/dL 0 38*   CALCIUM mg/dL 8 7   TOTAL PROTEIN g/dL 6 4   BILIRUBIN TOTAL mg/dL 1 41*   ALK PHOS U/L 93   ALT U/L 9*   AST U/L 14   GLUCOSE RANDOM mg/dL 93       Results from last 7 days  Lab Units 04/12/18  0522   INR  2 08*         Recent Cultures (last 7 days):       Results from last 7 days  Lab Units 04/10/18  1143   URINE CULTURE  >100,000 cfu/ml Gram Negative Arnold resembling Escherichia coli*       Last 24 Hours Medication List:     Current Facility-Administered Medications:  acetaminophen 650 mg Oral Q6H PRN Litzy Bath, PA-KAROLINE    calcium carbonate-vitamin D 1 tablet Oral Daily CHANNING Yun-KAROLINE    cefazolin 1,000 mg Intravenous Q12H Rena Yost PA-C Last Rate: 1,000 mg (04/12/18 0454)   lactulose 10 g Oral Daily Rena Yost PA-C    levETIRAcetam 750 mg Oral BID Rena Yost PA-C    magnesium hydroxide 30 mL Oral Daily PRN CHANNING Prather-KAROLINE    metoprolol tartrate 50 mg Oral Q8H CHANNING Yun-KAROLINE    ondansetron 4 mg Intravenous Q6H PRN CHANNING Yun-KAROLINE    sodium chloride 75 mL/hr Intravenous Continuous THERESA Partida    [START ON 4/16/2018] warfarin 1 mg Oral Once per day on Mon [place], [place]    warfarin 2 5 mg Oral Once per day on Sun Tue Wed Thu Fri Sat Kala Mo PA-C         Today, Patient Was Seen By: THERESA Harrison    ** Please Note: Dragon 360 Dictation voice to text software may have been used in the creation of this document   **

## 2018-04-12 NOTE — ASSESSMENT & PLAN NOTE
· Urine culture showing gram negative rods, follow up end point end sensitivities  · Continue with IV ancef for now pending sensitives  · Continues to be afebrile without leukocytosis     · Denies any urinary symptoms  · Monitor

## 2018-04-13 VITALS
OXYGEN SATURATION: 93 % | HEIGHT: 63 IN | WEIGHT: 154.54 LBS | TEMPERATURE: 98 F | RESPIRATION RATE: 18 BRPM | HEART RATE: 69 BPM | SYSTOLIC BLOOD PRESSURE: 119 MMHG | BODY MASS INDEX: 27.38 KG/M2 | DIASTOLIC BLOOD PRESSURE: 62 MMHG

## 2018-04-13 PROBLEM — E80.6 HYPERBILIRUBINEMIA: Status: RESOLVED | Noted: 2018-04-11 | Resolved: 2018-04-13

## 2018-04-13 PROBLEM — N17.9 AKI (ACUTE KIDNEY INJURY) (HCC): Status: RESOLVED | Noted: 2018-04-10 | Resolved: 2018-04-13
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
## 2018-04-13 LAB
ALBUMIN SERPL BCP-MCNC: 2.4 G/DL (ref 3.5–5)
ALP SERPL-CCNC: 82 U/L (ref 46–116)
ALT SERPL W P-5'-P-CCNC: 10 U/L (ref 12–78)
ANION GAP SERPL CALCULATED.3IONS-SCNC: 6 MMOL/L (ref 4–13)
AST SERPL W P-5'-P-CCNC: 17 U/L (ref 5–45)
BASOPHILS # BLD AUTO: 0.01 THOUSANDS/ΜL (ref 0–0.1)
BASOPHILS NFR BLD AUTO: 0 % (ref 0–1)
BILIRUB SERPL-MCNC: 0.7 MG/DL (ref 0.2–1)
BUN SERPL-MCNC: 14 MG/DL (ref 5–25)
CALCIUM SERPL-MCNC: 8.2 MG/DL
CHLORIDE SERPL-SCNC: 112 MMOL/L (ref 100–108)
CO2 SERPL-SCNC: 23 MMOL/L (ref 21–32)
CREAT SERPL-MCNC: 0.34 MG/DL (ref 0.6–1.3)
EOSINOPHIL # BLD AUTO: 0.17 THOUSAND/ΜL (ref 0–0.61)
EOSINOPHIL NFR BLD AUTO: 4 % (ref 0–6)
ERYTHROCYTE [DISTWIDTH] IN BLOOD BY AUTOMATED COUNT: 13.8 % (ref 11.6–15.1)
GFR SERPL CREATININE-BSD FRML MDRD: 103 ML/MIN/1.73SQ M
GLUCOSE SERPL-MCNC: 103 MG/DL (ref 65–140)
HCT VFR BLD AUTO: 35.6 % (ref 34.8–46.1)
HGB BLD-MCNC: 12.3 G/DL (ref 11.5–15.4)
LYMPHOCYTES # BLD AUTO: 1.91 THOUSANDS/ΜL (ref 0.6–4.47)
LYMPHOCYTES NFR BLD AUTO: 40 % (ref 14–44)
MCH RBC QN AUTO: 29.1 PG (ref 26.8–34.3)
MCHC RBC AUTO-ENTMCNC: 34.6 G/DL (ref 31.4–37.4)
MCV RBC AUTO: 84 FL (ref 82–98)
MONOCYTES # BLD AUTO: 0.37 THOUSAND/ΜL (ref 0.17–1.22)
MONOCYTES NFR BLD AUTO: 8 % (ref 4–12)
NEUTROPHILS # BLD AUTO: 2.37 THOUSANDS/ΜL (ref 1.85–7.62)
NEUTS SEG NFR BLD AUTO: 48 % (ref 43–75)
NRBC BLD AUTO-RTO: 0 /100 WBCS
PLATELET # BLD AUTO: 162 THOUSANDS/UL (ref 149–390)
PMV BLD AUTO: 10 FL (ref 8.9–12.7)
POTASSIUM SERPL-SCNC: 3.8 MMOL/L (ref 3.5–5.3)
PROT SERPL-MCNC: 6.1 G/DL (ref 6.4–8.2)
RBC # BLD AUTO: 4.23 MILLION/UL (ref 3.81–5.12)
SODIUM SERPL-SCNC: 141 MMOL/L (ref 136–145)
WBC # BLD AUTO: 4.84 THOUSAND/UL (ref 4.31–10.16)

## 2018-04-13 PROCEDURE — 85025 COMPLETE CBC W/AUTO DIFF WBC: CPT | Performed by: NURSE PRACTITIONER

## 2018-04-13 PROCEDURE — 97535 SELF CARE MNGMENT TRAINING: CPT

## 2018-04-13 PROCEDURE — 80053 COMPREHEN METABOLIC PANEL: CPT | Performed by: NURSE PRACTITIONER

## 2018-04-13 PROCEDURE — 97530 THERAPEUTIC ACTIVITIES: CPT | Performed by: PHYSICAL THERAPIST

## 2018-04-13 PROCEDURE — 99239 HOSP IP/OBS DSCHRG MGMT >30: CPT | Performed by: NURSE PRACTITIONER

## 2018-04-13 RX ORDER — DOXYCYCLINE HYCLATE 100 MG/1
100 CAPSULE ORAL EVERY 12 HOURS SCHEDULED
Qty: 8 CAPSULE | Refills: 0 | Status: SHIPPED | OUTPATIENT
Start: 2018-04-13 | End: 2018-04-17

## 2018-04-13 RX ORDER — POLYETHYLENE GLYCOL 3350 17 G/17G
17 POWDER, FOR SOLUTION ORAL ONCE
Status: COMPLETED | OUTPATIENT
Start: 2018-04-13 | End: 2018-04-13

## 2018-04-13 RX ORDER — DOXYCYCLINE HYCLATE 100 MG/1
100 CAPSULE ORAL EVERY 12 HOURS SCHEDULED
Qty: 8 CAPSULE | Refills: 0 | Status: SHIPPED | OUTPATIENT
Start: 2018-04-13 | End: 2018-04-13

## 2018-04-13 RX ADMIN — ACETAMINOPHEN 650 MG: 325 TABLET, FILM COATED ORAL at 05:34

## 2018-04-13 RX ADMIN — LEVETIRACETAM 750 MG: 750 TABLET ORAL at 08:36

## 2018-04-13 RX ADMIN — METOPROLOL TARTRATE 50 MG: 50 TABLET ORAL at 08:36

## 2018-04-13 RX ADMIN — CALCIUM CARBONATE-VITAMIN D TAB 500 MG-200 UNIT 1 TABLET: 500-200 TAB at 08:37

## 2018-04-13 RX ADMIN — MAGNESIUM HYDROXIDE 30 ML: 400 SUSPENSION ORAL at 08:34

## 2018-04-13 RX ADMIN — SODIUM CHLORIDE 75 ML/HR: 0.9 INJECTION, SOLUTION INTRAVENOUS at 03:47

## 2018-04-13 RX ADMIN — DOXYCYCLINE HYCLATE 100 MG: 100 CAPSULE, GELATIN COATED ORAL at 08:36

## 2018-04-13 RX ADMIN — POLYETHYLENE GLYCOL 3350 17 G: 17 POWDER, FOR SOLUTION ORAL at 12:18

## 2018-04-13 RX ADMIN — LACTULOSE 10 G: 20 SOLUTION ORAL at 08:34

## 2018-04-13 RX ADMIN — METOPROLOL TARTRATE 50 MG: 50 TABLET ORAL at 00:15

## 2018-04-13 NOTE — DISCHARGE SUMMARY
Discharge Summary - [facility] Internal Medicine    Patient Information: Blaise Ramos 80 y o  female MRN: [id number]  Unit/Bed#: -01 Encounter: [id number]    Discharging Physician / Practitioner: THERESA Cloud  PCP: Veronica Aldana MD  Admission Date: 4/10/2018  Discharge Date: 04/13/18    Reason for Admission: Fall    Diagnosis at D/C: UTI     Discharge Diagnoses:     Principal Problem (Resolved): Fall  Active Problems:    Paroxysmal atrial fibrillation (HCC)    Seizure disorder ([facility] )    Hyperlipemia    Essential hypertension    Dementia  Resolved Problems:    Urinary tract infection    MOE (acute kidney injury) ([facility] )    Hyperbilirubinemia      Consultations During Hospital Stay:  · PT/OT    Procedures Performed:     · None    Significant Findings / Test Results:     · CT of the head:  Chronic left MCA territory infarct  No acute intracranial abnormality  · CT C-spine:  No cervical spine fracture or traumatic malalignment  · CT lumbar spine:  No evidence of acute fracture or other significant abnormality in the lumbar spine  · CT abdomen pelvis:  Negative  · INR 1 93 on admission, 2 08 prior to discharge  · Urinalysis positive, urine culture showing ESBL and lactobacillus  Incidental Findings:   · None     Test Results Pending at Discharge (will require follow up): · None     Outpatient Tests Requested:  · Follow-up with PCP  · Visiting nurses, home PT/OT    Complications:  None    Hospital Course:     Blaise Ramos is a 80 y o  female patient with past medical history of stroke with expressive aphasia as residual, dementia, atrial fibrillation on Coumadin, hypertension, seizures, hyperlipidemia who originally presented to the hospital on 4/10/2018 due to mechanical fall  She did have a mild MOE on admission this resolved with intravenous fluids  Her bilirubin was also elevated but improved with intravenous fluids, likely secondary to dehydration    All imaging on admission of head, C-spine, lumbar spine, abdomen pelvis were negative on admission  Urinalysis was positive and patient also had mild leukocytosis on admission she was started on intravenous Ancef but later transitioned to oral doxycycline given susceptibilities from A urine culture  which grew ESBL and lactobacillus  Patient was evaluated by Physical and Occupational therapy who recommended short-term rehab however son requested that she return back to [facility] which is an assisted living facility where she currently resides  I discussed this with case management a confirm that she is okay to return  We have set up home physical therapy for the patient  Patient will need 4 more days of oral doxycycline for treatment of her urinary tract infection  Patient is stable for discharge  Félix Easley was updated  Condition at Discharge: stable     Discharge Day Visit / Exam:     Subjective:  Patient offers no complaints  Vitals: Blood Pressure: 119/62 (04/13/18 0719)  Pulse: 69 (04/13/18 0719)  Temperature: 98 °F (36 7 °C) (04/13/18 0719)  Temp Source: Oral (04/13/18 0719)  Respirations: 18 (04/13/18 0719)  Height: 5' 3" (160 cm) (04/10/18 2109)  Weight - Scale: 70 1 kg (154 lb 8 7 oz) (04/10/18 2109)  SpO2: 93 % (04/13/18 0719)  Exam:   Physical Exam   Constitutional: No distress  HENT:   Head: Normocephalic  Mouth/Throat: Abnormal dentition (Poor dentition)  Eyes: Pupils are equal, round, and reactive to light  Neck: Normal range of motion  No JVD present  Cardiovascular: Normal rate  An irregularly irregular rhythm present  No murmur heard  Pulmonary/Chest: Effort normal and breath sounds normal    Abdominal: Soft  Bowel sounds are normal  She exhibits no distension  There is no tenderness  Musculoskeletal: Normal range of motion  She exhibits no edema  Neurological: She is alert  Oriented to person and place  Expressive aphasia  Skin: Skin is warm and dry     Psychiatric: She has a normal mood and affect  Nursing note and vitals reviewed  Discussion with Family:  Patient and son over the phone    Discharge instructions/Information to patient and family:   See after visit summary for information provided to patient and family  Provisions for Follow-Up Care:  See after visit summary for information related to follow-up care and any pertinent home health orders  Disposition:     Home with VNA Services (Reminder: Complete face to face encounter)    For Discharges to [facility] SNF:   · Not Applicable to this Patient - Not Applicable to this Patient    Planned Readmission: no     Discharge Statement:  I spent 35 minutes discharging the patient  This time was spent on the day of discharge  I had direct contact with the patient on the day of discharge  Greater than 50% of the total time was spent examining patient, answering all patient questions, arranging and discussing plan of care with patient as well as directly providing post-discharge instructions  Additional time then spent on discharge activities  Discharge Medications:  See after visit summary for reconciled discharge medications provided to patient and family        ** Please Note: This note has been constructed using a voice recognition system **

## 2018-04-13 NOTE — SOCIAL WORK
CM reviewed Pt in care coordination rounds, Pt can D/C home to Assisted Living facility today  CM confirmed with Mickie REA at [facility] that they are able to accept Pt back today  Elin Lozano updated with D/C date  CM spoke with Pt's son Manuela Hernandez via telephone to inform of D/C plan and time  RN aware

## 2018-04-13 NOTE — PLAN OF CARE
Problem: PHYSICAL THERAPY ADULT  Goal: Performs mobility at highest level of function for planned discharge setting  See evaluation for individualized goals  Treatment/Interventions: Functional transfer training, LE strengthening/ROM, Therapeutic exercise, Endurance training, Patient/family training, Equipment eval/education, Bed mobility, Gait training  Equipment Recommended: Wheelchair, Clemencia Spanner       See flowsheet documentation for full assessment, interventions and recommendations  Outcome: Progressing  Prognosis: Fair  Problem List: Decreased strength, Decreased range of motion, Decreased endurance, Impaired balance, Decreased mobility, Decreased safety awareness, Impaired sensation  Assessment: Pt demonstrated significant progress today since initial PT evaluation  Pt able to roll and perform sup> sit with max A x1  Also able to initiate sit>stand and SPT with RW today; at first 2nd person present and pt required mod A x2 but with repeated practice pt able to stand up with mod A x1  2nd person still present for SPT although only steadying w/c and providing CG @ most  Pt also able to amb up to 20' with RW @ mod A level, and w/c follow  Per  note pt was transferring and ambulating 15' @ nursing home with assist of 1  Pt also appeared more talkative and engaged today, no c/o pain, able to enagage in appropriate conversation  Practiced w/c propulsion since pt reports she utilizes w/c @ nursing home to get to/from dining davison  Pt appears to be back to baseline functioning @ this time, however will continue to benefit from skilled PT to ensure consistency with functional mobility and prevent further decline while in hospital setting  Based on pt's performance today created new goals as follows: Pt will perform sit>stand and SPT with RW with mod A x1; pt will ambulate 20' with RW and mod A x1     Barriers to Discharge: None     Recommendation: (S) Short-term skilled PT     PT - OK to Discharge: Yes    See flowsheet documentation for full assessment

## 2018-04-13 NOTE — PLAN OF CARE
Problem: OCCUPATIONAL THERAPY ADULT  Goal: Performs self-care activities at highest level of function for planned discharge setting  See evaluation for individualized goals  Treatment Interventions: ADL retraining, Functional transfer training, Endurance training, Patient/family training          See flowsheet documentation for full assessment, interventions and recommendations  Outcome: Progressing  Limitation: Decreased ADL status, Decreased UE ROM, Decreased UE strength, Decreased Safe judgement during ADL, Decreased cognition, Decreased endurance  Prognosis: Fair  Assessment: Pt participated in skilled Tx session focused on ADL fxn, ADL xfers  Pt overall req mod A x 1 for SPT with RW and A x 2 for toileting and LB dressing tasks when in stance  Cues req overall for safety with management of RW  recommended assist at d/c for all ADL tasks and xfers  Recommending seat belt on, brakes on, and leg rests applied to w/c to dec risk for falls   Alarm placed and activated at end of tx session     OT Discharge Recommendation: Short Term Rehab (vs return to facility)

## 2018-04-13 NOTE — OCCUPATIONAL THERAPY NOTE
OccupationalTherapy Progress Note     Patient Name: Satish Botello  UXHUC'K Date: 4/13/2018  Problem List  Patient Active Problem List   Diagnosis    Se[facility] disorder [facility]    Hyperlipemia    Paroxysmal atrial fibrillation ([facility] )    Essential hypertension    Dementia        04/13/18 1045   Restrictions/Precautions   Weight Bearing Precautions Per Order No   Other Precautions Multiple lines; Fall Risk;Seizure; Bed Alarm; Chair Alarm;Cognitive   Pain Assessment   Pain Assessment No/denies pain   Pain Score No Pain   ADL   Where Assessed Commode   Grooming Assistance 5  Supervision/Setup   Grooming Comments Pt completed oral care and hygiene from w/c level as pt reports she completes these tasks in this positioning at home  Pt performed tooth brushing at S level   LB Dressing Comments Pt able to complete cross legged tech with b/l LEs to doff/don socks from seated level  Pt req CGA for steadiyng with reaching, however with inc time allotted pt able to complete ADL task   Toileting Assistance  1  Total Assistance   Toileting Comments Pt req A x 2 for toileting tasks this am  Pt reports frustration as she was unable to have a BM  Pt assisted to [facility] at mod A x 1 level SPT with RW  Pt req cues for safe positioning and hand placement  Pt req A x 2 for clothing management with A x 1 to steady and assist of another for clothing management and hygiene  Toilet Transfers   Toilet Transfer From Rolling walker   Toilet Transfer Type To and from   Toilet Transfer to Standard bedside commode   Toilet Transfer Technique Stand pivot; To left; To right   Toilet Transfers Moderate assistance   Cognition   Overall Cognitive Status Impaired   Assessment   Assessment Pt participated in skilled Tx session focused on ADL fxn, ADL xfers  Pt overall req mod A x 1 for SPT with RW and A x 2 for toileting and LB dressing tasks when in stance   Cues req overall for safety with management of RW  recommended assist at d/c for all ADL tasks and xfers  Recommending seat belt on, brakes on, and leg rests applied to w/c to dec risk for falls  Alarm placed and activated at end of tx session   Plan   Treatment Interventions ADL retraining;Functional transfer training;UE strengthening/ROM; Endurance training;Cognitive reorientation;Patient/family training;Equipment evaluation/education; Compensatory technique education   Treatment Day 1   OT Frequency 2-3x/wk   Barthel Index   Feeding 5   Bathing 0   Grooming Score 5   Dressing Score 5   Bladder Score 0   Bowels Score 0   Toilet Use Score 5   Transfers (Bed/Chair) Score 5   Mobility (Level Surface) Score 0   Stairs Score 0   Barthel Index Score 25   Modified Houston Scale   Modified Houston Scale 4

## 2018-04-16 ENCOUNTER — TRANSITIONAL CARE MANAGEMENT (OUTPATIENT)
Dept: FAMILY MEDICINE CLINIC | Facility: HOSPITAL | Age: 83
End: 2018-04-16

## 2018-04-16 ENCOUNTER — TELEPHONE (OUTPATIENT)
Dept: FAMILY MEDICINE CLINIC | Facility: HOSPITAL | Age: 83
End: 2018-04-16

## 2018-04-16 NOTE — TELEPHONE ENCOUNTER
I spoke to Beryle Dolores at [facility] she is aware that pt needs to be rechecked for INR in 2 weeks DD    Please advise Pt had  INR done on 4/12 it was 2 08, pt goal is 2 0-3 0 her current dose is 1 mg on Mon and 2 5 all other day DD

## 2018-04-19 ENCOUNTER — OFFICE VISIT (OUTPATIENT)
Dept: FAMILY MEDICINE CLINIC | Facility: HOSPITAL | Age: 83
End: 2018-04-19
Payer: MEDICARE

## 2018-04-19 VITALS
WEIGHT: 154 LBS | DIASTOLIC BLOOD PRESSURE: 62 MMHG | SYSTOLIC BLOOD PRESSURE: 118 MMHG | BODY MASS INDEX: 27.29 KG/M2 | HEIGHT: 63 IN | RESPIRATION RATE: 16 BRPM | HEART RATE: 72 BPM

## 2018-04-19 DIAGNOSIS — I10 ESSENTIAL HYPERTENSION: Chronic | ICD-10-CM

## 2018-04-19 DIAGNOSIS — I48.0 PAROXYSMAL ATRIAL FIBRILLATION (HCC): Primary | Chronic | ICD-10-CM

## 2018-04-19 DIAGNOSIS — E03.8 OTHER SPECIFIED HYPOTHYROIDISM: ICD-10-CM

## 2018-04-19 PROBLEM — K59.09 CHRONIC CONSTIPATION: Status: ACTIVE | Noted: 2018-04-19

## 2018-04-19 PROBLEM — E03.9 HYPOTHYROIDISM: Status: ACTIVE | Noted: 2018-01-23

## 2018-04-19 PROCEDURE — 99495 TRANSJ CARE MGMT MOD F2F 14D: CPT | Performed by: INTERNAL MEDICINE

## 2018-04-19 NOTE — PROGRESS NOTES
Assessment/Plan:     Paroxysmal atrial fibrillation (HCC)   Currently normal sinus rhythm, on warfarin for CVA prophylaxis    Essential hypertension   Blood pressures are well controlled on metoprolol    Other specified hypothyroidism   Patient is not taking levothyroxine and there are no recent TSH is on chart  Will recheck TSH to see if this is an accurate diagnosis    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   Aphasia is at baseline    Seizure disorder ([facility] )   Stable on [place]       Diagnoses and all orders for this visit:    Paroxysmal atrial fibrillation (HCC)    Essential hypertension  -     TSH, 3rd generation with T4 reflex; Future  -     Lipid panel; Future  -     Basic metabolic panel; Future    Other specified hypothyroidism         Subjective:     Patient ID: Tammy King is a 80 y o  female  I spoke with patient's nurse not tight shot at [employer] who told me that since returned to [employer] patient has no falls, has been eating and drinking well  At times she has constipation  There is no evidence of  Overt bleeding,  She had no seizures      Atrial Fibrillation   Presents for follow-up visit  Symptoms are negative for chest pain, palpitations, shortness of breath and weakness  The symptoms have been stable  Past medical history includes atrial fibrillation  Hypertension   This is a chronic problem  The current episode started more than 1 year ago  The problem is unchanged  The problem is controlled  Pertinent negatives include no chest pain, headaches, palpitations or shortness of breath  Review of Systems   Constitutional: Negative for fever  HENT: Negative for congestion  Eyes: Negative for visual disturbance  Respiratory: Negative for cough and shortness of breath  Cardiovascular: Negative for chest pain, palpitations and leg swelling  Gastrointestinal: Positive for constipation  Negative for abdominal pain and blood in stool     Endocrine: Negative for polydipsia and polyphagia  Genitourinary: Negative for difficulty urinating and dysuria  Patient denies dysuria frequency urgency   Musculoskeletal: Negative for joint swelling, myalgias and neck stiffness  Skin: Negative for rash  Neurological: Negative for weakness and headaches  Hematological: Negative for adenopathy  Psychiatric/Behavioral: Negative for dysphoric mood  All other systems reviewed and are negative  Objective:     Physical Exam   Constitutional: She appears well-developed  No distress  HENT:   Head: Normocephalic  Mouth/Throat: Oropharynx is clear and moist    Eyes: Conjunctivae are normal    Neck: Neck supple  Cardiovascular: Normal rate and regular rhythm  Currently in sinus rhythm   Pulmonary/Chest: Effort normal  No respiratory distress  She has no wheezes  She has no rales  Abdominal: Soft  Bowel sounds are normal  She exhibits no distension  There is no tenderness  Musculoskeletal: She exhibits no tenderness  Lymphadenopathy:     She has no cervical adenopathy  Neurological: She is alert  No cranial nerve deficit  Expressive aphasia is present   Skin: Skin is warm and dry  No rash noted  Psychiatric: She has a normal mood and affect  Vitals reviewed  Vitals:    04/19/18 1016   BP: 118/62   Pulse: 72   Resp: 16   Weight: 69 9 kg (154 lb)   Height: 5' 3" (1 6 m)       Transitional Care Management Review:  Shell Odell is a 80 y o  female here for TCM follow up       During the TCM phone call patient stated:    Date and time hospital follow up call was made:  4/16/2018  9:43 AM  Hospital care reviewed:  Records reviewed  Patient was hopsitalized at:  [facility]  Date of admission:  4/10/18  Date of discharge:  4/13/18  Diagnosis:  Fall, UTI  Were the patients medicaitons reviewed and updated:  Yes  Current symptoms:  None  Scheduled for follow up?:  Yes  I have advised the patient to call PCP with any new or worsening symptoms (please type in name along with any credentials):  Dr Lorena Haywood MD  Living Arrangements:  Friends  Support System:  Home care staff  Are you recieving outpatient services:  Yes  What type(s) of services:  [facility] PT/OT  Comments:  Per Carolina Jones at [facility] - PT was doing fine this morning - sitting out in the halls              Lorena Haywood MD

## 2018-04-19 NOTE — ASSESSMENT & PLAN NOTE
Patient is not taking levothyroxine and there are no recent TSH is on chart    Will recheck TSH to see if this is an accurate diagnosis

## 2018-04-27 NOTE — PROGRESS NOTES
Call patient: Labs showed that patient's TSH is low    I would like to repeat TSH with the free T4 and free T3

## 2018-05-03 LAB — INR PPP: 2.1 (ref 0.86–1.16)

## 2018-05-10 ENCOUNTER — TELEPHONE (OUTPATIENT)
Dept: FAMILY MEDICINE CLINIC | Facility: HOSPITAL | Age: 83
End: 2018-05-10

## 2018-05-10 ENCOUNTER — ANTICOAG VISIT (OUTPATIENT)
Dept: FAMILY MEDICINE CLINIC | Facility: HOSPITAL | Age: 83
End: 2018-05-10

## 2018-05-10 NOTE — TELEPHONE ENCOUNTER
Cherelle from [address] [place] [address] called looking for INR  instructions for pt    INR was done on 05/03/2018, INR was 2 1 pt current dose is 1 mg Mondays and 2 5 mg rest, was unable to process in a timely manor because we did not receive the labs, and the faxes are behind!!! Please advise DD

## 2018-05-17 ENCOUNTER — OFFICE VISIT (OUTPATIENT)
Dept: FAMILY MEDICINE CLINIC | Facility: HOSPITAL | Age: 83
End: 2018-05-17
Payer: MEDICARE

## 2018-05-17 VITALS
BODY MASS INDEX: 27.29 KG/M2 | HEART RATE: 66 BPM | HEIGHT: 63 IN | DIASTOLIC BLOOD PRESSURE: 68 MMHG | WEIGHT: 154 LBS | RESPIRATION RATE: 16 BRPM | SYSTOLIC BLOOD PRESSURE: 130 MMHG

## 2018-05-17 DIAGNOSIS — Z13.5 SCREENING FOR GLAUCOMA: ICD-10-CM

## 2018-05-17 DIAGNOSIS — I83.90 VARICOSE VEIN OF LEG: ICD-10-CM

## 2018-05-17 DIAGNOSIS — Z00.00 MEDICARE ANNUAL WELLNESS VISIT, SUBSEQUENT: Primary | ICD-10-CM

## 2018-05-17 PROCEDURE — G0439 PPPS, SUBSEQ VISIT: HCPCS | Performed by: INTERNAL MEDICINE

## 2018-05-17 NOTE — PROGRESS NOTES
HPI:  Josette Wilkerson is a 80 y o  female here for her Subsequent Wellness Visit  Patient Active Problem List   Diagnosis    [facility] disorder [facility]    Other hyperlipidemia    Paroxysmal atrial fibrillation (HCC)    Essential hypertension    Vascular dementia without behavioral disturbance    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    Other specified hypothyroidism    Chronic constipation     Past Medical History:   Diagnosis Date    A-fib ([facility] )     Ambulatory dysfunction     Anemia     Aphasia     Falls frequently     GERD (gastroesophageal reflux disease)     Hyperlipidemia     Hypertension     Muscle weakness     Osteoporosis     Seizures ([facility] )     Stroke ([facility] )     T6 vertebral fracture (HCC)      Past Surgical History:   Procedure Laterality Date    NV OPEN RX FEMUR FX+INTRAMED PRISCILLA Left 7/25/2016    Procedure: INSERTION NAIL IM FEMUR ANTEGRADE (TROCHANTERIC); Surgeon: Rachelle Rocha MD;  Location:  MAIN OR;  Service: Orthopedics    WRIST FRACTURE SURGERY       Family History   Problem Relation Age of Onset    Heart disease Mother     Heart disease Father     Dementia Sister     Mental illness Sister     Substance Abuse Neg Hx      unknown fam hx     History   Smoking Status    Never Smoker   Smokeless Tobacco    Never Used     Comment: Per [employer] Former smoker     History   Alcohol Use No      History   Drug Use No     /68   Pulse 66   Resp 16   Ht 5' 3" (1 6 m)   Wt 69 9 kg (154 lb)   LMP  (LMP Unknown)   BMI 27 28 kg/m²       Current Outpatient Prescriptions   Medication Sig Dispense Refill    Acetaminophen (APAP) 325 MG tablet Take 650 mg by mouth every 6 (six) hours as needed for mild pain   calcium-vitamin D (OSCAL 500 + D) 500 mg-200 units per tablet Take 1 tablet by mouth daily   Cholecalciferol (VITAMIN D PO) Take 1,000 Int'l Units by mouth daily        lactulose (CEPHULAC) 10 G packet Take 10 g by mouth daily        levETIRAcetam (KEPPRA) 750 mg tablet Take 750 mg by mouth 2 (two) times a day      magnesium hydroxide (MILK OF MAGNESIA) 400 mg/5 mL oral suspension Take 30 mL by mouth daily as needed for constipation      metoprolol tartrate (LOPRESSOR) 50 mg tablet Take 50 mg by mouth every 8 (eight) hours      warfarin (COUMADIN) 1 mg tablet Take 1 mg by mouth daily Every Monday      warfarin (COUMADIN) 2 5 mg tablet Take 2 5 mg by mouth daily One tablet 6 times weekly (omit Monday)        No current facility-administered medications for this visit        Allergies   Allergen Reactions    Ceftriaxone     Codeine     Levofloxacin      Immunization History   Administered Date(s) Administered    Influenza Split High Dose Preservative Free IM 10/06/2015    Influenza TIV (IM) 10/16/2013       Patient Care Team:  Erendira Bejarano MD as PCP - General (Internal Medicine)  Hilton Harada, MD Render Figures, MD Irena Motley, MD Serafin Spring, MD      Medicare Screening Tests and Risk Assessments:  AWV Clinical     ISAR:   Previous hospitalizations?:  Yes   How many hospitalizations have you had in the last year?:  more than 4       Once in a Lifetime Medicare Screening:       Medicare Screening Tests and Risk Assessment:   AAA Risk Assessment    Osteoporosis Risk Assessment    HIV Risk Assessment        Drug and Alcohol Use:   Tobacco use    Cigarettes:  never smoker    Tobacco use duration    Tobacco Cessation Readiness    Alcohol use    Alcohol use:  never    Alcohol Treatment Readiness   Illicit Drug Use    Drug use:  never        Diet & Exercise:   Diet   What is your diet?:  Regular   How many servings a day of the following:   Exercise    Do you currently exercise?:  unable to exercise       Cognitive Impairment Screening:   Depression screening preformed:  Yes Depression screen score:  0   Cognitive Impairment Screening    Do you have difficulty with language?:  Yes        Functional Ability/Level of Safety:   Hearing    Hearing Impairment Assessment    Hearing status:  No impairment   Current Activities    Help needed with the folllowing:    Shopping:  Yes Preparing Meals:  Yes   Doing Housework:  Yes Doing Laundry:  Yes   Managing Medications:  Yes Managing Money:  Yes   ADL    Feeding:  Independant   Oral hygiene and Facial grooming:  Independant   Bathing:  Maximum assistance   Upper Body Dressing:  Maximum assistance   Toileting:  Independant   Bed Mobility:  Maximum assistance   Fall Risk   Have you fallen in the last 12 months?:  No    Injury History       Home Safety:   Are there hazards in your environment?:  No   Home Safety Risk Factors   Unfamilar with surroundings:  No        Advanced Directives:   Advanced Directives    Living Will:  Yes    Patient's End of Life Decisions    Reviewed with patient:  Yes    End of life decisions comments:  Pt wishes to be level 3 dnr       Urinary Incontinence:       Glaucoma:            Provider Screening     Preventative Screening/Counseling:   Cardiovascular Screening/Counseling:   (Labs Q5 years, EKG optional one-time)   General:  Risks and Benefits Discussed Counseling:  Healthy Diet          Diabetes Screening/Counseling:   (2 tests/year if Pre-Diabetes or 1 test/year if no Diabetes)   General:  Risks and Benefits Discussed, Screening Current Counseling:  Healthy Diet          Colorectal Cancer Screening/Counseling:   (FOBT Q1 yr; Flex Sig Q4 yrs or Q10 yrs after Screening Colonoscopy; Screening Colonoscpy Q2 yrs High Risk or Q10 yrs Low Risk; Barium Enema Q2 yrs High Risk or Q4 yrs Low Risk)   General:  Risks and Benefits Discussed, Screening Not Indicated           Prostate Cancer Screening/Counseling:   (Annual)          Breast Cancer Screening/Counseling:   (Baseline Age 28 - 43; Annual Age 36+)   General:  Risks and Benefits Discussed, Screening Not Indicated          Cervical Cancer Screening/Counseling:   (Annual for High Risk or Childbearing Age with Abnormal Pap in Last 3 yrs;  Every 2 all others)   General: Risks and Benefits Discussed, Screening Not Indicated           Osteoporosis Screening/Counseling:   (Every 2 Yrs if at risk or more if medically necessary)   General:  Risks and Benefits Discussed, Screening Current           AAA Screening/Counseling:   (Once per Lifetime with risk factors)          Glaucoma Screening/Counseling:   (Annual)   General:  Risks and Benefits Discussed          HIV Screening/Counseling:   (Voluntary; Once annually for high risk OR 3 times for Pregnancy at diagnosis of IUP; 3rd trimester; and at Labor   General:  Screening Not Indicated           Hepatitis C Screening:             Immunizations:   Influenza (annual):  Risks & Benefits Discussed, Influenza UTD This Year       Other Preventative Couseling (Non-Medicare Wellness Visit Required):       Referrals (Non-Medicare Wellness Visit Required):       Medical Equipment/Suppliers:           No exam data present  Reviewed Updated [facility] Prior Wellness Visits:   Last Medicare wellness visit information was reviewed, patient interviewed , no change since last AWVyes  Last Medicare wellness visit information was reviewed, patient interviewed and updates made to the record today yes    Assessment and Plan:  1  Medicare annual wellness visit, subsequent     2  Screening for glaucoma  Ambulatory Referral to Ophthalmology   3   Varicose vein of leg  Compression Stocking       Health Maintenance Due   Topic Date Due    DTaP,Tdap,and Td Vaccines (1 - Tdap) 07/15/1956    PNEUMOCOCCAL POLYSACCHARIDE VACCINE AGE 65 AND OVER  07/15/2000    GLAUCOMA SCREENING 67+ YR  07/15/2002

## 2018-05-21 DIAGNOSIS — I10 ESSENTIAL HYPERTENSION: Primary | ICD-10-CM

## 2018-05-21 RX ORDER — METOPROLOL TARTRATE 50 MG/1
TABLET, FILM COATED ORAL
Qty: 84 TABLET | Refills: 0 | Status: SHIPPED | OUTPATIENT
Start: 2018-05-21 | End: 2020-04-27 | Stop reason: SDUPTHER

## 2018-05-25 NOTE — PROGRESS NOTES
Call patient:  INR is a therapeutic range  Continue same dose of warfarin recheck it in 2 weeks  Her TSH suggest that her thyroid gland produces too much hormones I need to order a radio iodine uptake and scan of her thyroid gland

## 2018-05-31 LAB — INR PPP: 2.4 (ref 0.86–1.17)

## 2018-06-01 ENCOUNTER — TELEPHONE (OUTPATIENT)
Dept: FAMILY MEDICINE CLINIC | Facility: HOSPITAL | Age: 83
End: 2018-06-01

## 2018-06-01 ENCOUNTER — ANTICOAG VISIT (OUTPATIENT)
Dept: FAMILY MEDICINE CLINIC | Facility: HOSPITAL | Age: 83
End: 2018-06-01

## 2018-06-01 DIAGNOSIS — R79.89 ABNORMAL THYROID STIMULATING HORMONE (TSH) LEVEL: Primary | ICD-10-CM

## 2018-06-28 LAB — INR PPP: 2.5 (ref 0.86–1.17)

## 2018-06-29 ENCOUNTER — TELEPHONE (OUTPATIENT)
Dept: FAMILY MEDICINE CLINIC | Facility: HOSPITAL | Age: 83
End: 2018-06-29

## 2018-06-29 ENCOUNTER — ANTICOAG VISIT (OUTPATIENT)
Dept: FAMILY MEDICINE CLINIC | Facility: HOSPITAL | Age: 83
End: 2018-06-29

## 2018-07-26 LAB — INR PPP: 2.6 (ref 0.86–1.17)

## 2018-07-30 ENCOUNTER — ANTICOAG VISIT (OUTPATIENT)
Dept: FAMILY MEDICINE CLINIC | Facility: HOSPITAL | Age: 83
End: 2018-07-30

## 2018-07-30 NOTE — PROGRESS NOTES
As per [place] same dose INR in 4 weeks Latoya Valenzuela at [facility] is aware DD

## 2018-08-06 NOTE — PROGRESS NOTES
I spoke with LUCINDA RAMEY  RN: pt has no fever, lethargy, is her usual self, it's uncertain if she has dysuria, it's questionable whether she has UTI but since she's s/p CVA with aphasia will go ahead and treat her with nitrofurantoin 50mg qid for 7 days

## 2018-08-23 LAB — INR PPP: 2.9 (ref 0.86–1.17)

## 2018-08-24 ENCOUNTER — ANTICOAG VISIT (OUTPATIENT)
Dept: FAMILY MEDICINE CLINIC | Facility: HOSPITAL | Age: 83
End: 2018-08-24

## 2018-08-24 NOTE — PROGRESS NOTES
As per Truman Shane Same dose INR in 2 weeks , Vidya Abad at [facility] is aware DD

## 2018-09-06 LAB — INR PPP: 3 (ref 0.86–1.17)

## 2018-09-07 ENCOUNTER — ANTICOAG VISIT (OUTPATIENT)
Dept: FAMILY MEDICINE CLINIC | Facility: HOSPITAL | Age: 83
End: 2018-09-07

## 2018-09-24 NOTE — PROGRESS NOTES
Call patient: Abran Lawson that she possibly has UTI  I spoke with the IJ me at [facility] who told me that patient had a change in behaviors that prompted further investigation the and the discovery of foul-smelling urine  I will treat patient with Bactrim DS 1 p o  B i d   For 7 days pending culture results

## 2018-10-04 ENCOUNTER — OFFICE VISIT (OUTPATIENT)
Dept: FAMILY MEDICINE CLINIC | Facility: HOSPITAL | Age: 83
End: 2018-10-04
Payer: MEDICARE

## 2018-10-04 VITALS
HEIGHT: 63 IN | HEART RATE: 64 BPM | RESPIRATION RATE: 16 BRPM | SYSTOLIC BLOOD PRESSURE: 134 MMHG | WEIGHT: 152 LBS | DIASTOLIC BLOOD PRESSURE: 64 MMHG | BODY MASS INDEX: 26.93 KG/M2

## 2018-10-04 DIAGNOSIS — I48.0 PAROXYSMAL ATRIAL FIBRILLATION (HCC): Primary | Chronic | ICD-10-CM

## 2018-10-04 DIAGNOSIS — I10 ESSENTIAL HYPERTENSION: Chronic | ICD-10-CM

## 2018-10-04 LAB — INR PPP: 4.6 (ref 0.86–1.17)

## 2018-10-04 PROCEDURE — 99213 OFFICE O/P EST LOW 20 MIN: CPT | Performed by: INTERNAL MEDICINE

## 2018-10-04 NOTE — PROGRESS NOTES
Assessment/Plan:    Paroxysmal atrial fibrillation (HCC)  Sounds like she's in sinus rhythm today, will continue metoprolol and warfarin    Essential hypertension  Stable on metoprolol       Diagnoses and all orders for this visit:    Paroxysmal atrial fibrillation ([address] )    Essential hypertension          Subjective:      Patient ID: Iman Frausto is a 80 y o  female  Atrial Fibrillation   Presents for follow-up visit  Symptoms are negative for chest pain, dizziness, palpitations, shortness of breath and tachycardia  The symptoms have been stable  Past medical history includes atrial fibrillation  The following portions of the patient's history were reviewed and updated as appropriate: current medications, past family history, past medical history, past social history, past surgical history and problem list     Review of Systems   Constitutional: Negative for fever  Respiratory: Negative for shortness of breath  Cardiovascular: Negative for chest pain and palpitations  Gastrointestinal: Negative for abdominal pain, blood in stool and diarrhea  Genitourinary: Negative for dyspareunia, frequency, hematuria and urgency  Neurological: Negative for dizziness  Psychiatric/Behavioral: Negative for dysphoric mood  Objective:    /64   Pulse 64   Resp 16   Ht 5' 3" (1 6 m)   Wt 68 9 kg (152 lb)   LMP  (LMP Unknown)   BMI 26 93 kg/m²      Physical Exam   Constitutional: She appears well-developed  No distress  HENT:   Head: Normocephalic  Neck: Neck supple  No JVD present  Cardiovascular: Normal rate and regular rhythm  Pulmonary/Chest: Effort normal and breath sounds normal    Abdominal: Soft  Bowel sounds are normal  There is no tenderness  Neurological: She is alert  Aphasia is the same   Skin: Skin is warm and dry             Vanessa Mayers MD

## 2018-10-05 ENCOUNTER — TELEPHONE (OUTPATIENT)
Dept: FAMILY MEDICINE CLINIC | Facility: HOSPITAL | Age: 83
End: 2018-10-05

## 2018-10-05 ENCOUNTER — ANTICOAG VISIT (OUTPATIENT)
Dept: FAMILY MEDICINE CLINIC | Facility: HOSPITAL | Age: 83
End: 2018-10-05

## 2018-10-11 LAB — INR PPP: 2.4 (ref 0.86–1.17)

## 2018-10-12 ENCOUNTER — ANTICOAG VISIT (OUTPATIENT)
Dept: FAMILY MEDICINE CLINIC | Facility: HOSPITAL | Age: 83
End: 2018-10-12

## 2018-10-31 DIAGNOSIS — G40.909 NONINTRACTABLE EPILEPSY WITHOUT STATUS EPILEPTICUS, UNSPECIFIED EPILEPSY TYPE (HCC): Primary | ICD-10-CM

## 2018-10-31 RX ORDER — LEVETIRACETAM 750 MG/1
750 TABLET ORAL 2 TIMES DAILY
Qty: 56 TABLET | Refills: 5 | Status: SHIPPED | OUTPATIENT
Start: 2018-10-31 | End: 2020-02-29

## 2018-11-08 LAB — INR PPP: 3 (ref 0.86–1.17)

## 2018-11-09 ENCOUNTER — ANTICOAG VISIT (OUTPATIENT)
Dept: FAMILY MEDICINE CLINIC | Facility: HOSPITAL | Age: 83
End: 2018-11-09

## 2018-11-29 ENCOUNTER — TELEPHONE (OUTPATIENT)
Dept: FAMILY MEDICINE CLINIC | Facility: HOSPITAL | Age: 83
End: 2018-11-29

## 2018-11-29 LAB — INR PPP: 3.6 (ref 0.86–1.17)

## 2018-11-30 ENCOUNTER — ANTICOAG VISIT (OUTPATIENT)
Dept: FAMILY MEDICINE CLINIC | Facility: HOSPITAL | Age: 83
End: 2018-11-30

## 2018-11-30 NOTE — PROGRESS NOTES
Hold for 1 day then continue the same dose INR on Thursday (1 week) PPatty at [address] [place] [address] is aware CB/DD

## 2018-12-06 ENCOUNTER — TELEPHONE (OUTPATIENT)
Dept: FAMILY MEDICINE CLINIC | Facility: HOSPITAL | Age: 83
End: 2018-12-06

## 2018-12-07 ENCOUNTER — ANTICOAG VISIT (OUTPATIENT)
Dept: FAMILY MEDICINE CLINIC | Facility: HOSPITAL | Age: 83
End: 2018-12-07

## 2018-12-07 LAB — INR PPP: 3.7 (ref 0.86–1.17)

## 2018-12-07 NOTE — TELEPHONE ENCOUNTER
As per NK hold today then 1 mg sat , 2 5 mg all other days, INR in 2 weeks Radha at [facility] aware DD

## 2018-12-20 LAB — INR PPP: 4 (ref 0.86–1.17)

## 2018-12-21 ENCOUNTER — ANTICOAG VISIT (OUTPATIENT)
Dept: FAMILY MEDICINE CLINIC | Facility: HOSPITAL | Age: 83
End: 2018-12-21

## 2018-12-21 NOTE — PROGRESS NOTES
Hold warfarin today on the 21st and tomorrow December 22nd then starting on December 23rd take 2 mg daily, INR is on December 27th

## 2018-12-27 LAB — INR PPP: 1.8 (ref 0.86–1.17)

## 2018-12-28 ENCOUNTER — TELEPHONE (OUTPATIENT)
Dept: FAMILY MEDICINE CLINIC | Facility: HOSPITAL | Age: 83
End: 2018-12-28

## 2018-12-28 ENCOUNTER — ANTICOAG VISIT (OUTPATIENT)
Dept: FAMILY MEDICINE CLINIC | Facility: HOSPITAL | Age: 83
End: 2018-12-28

## 2018-12-28 NOTE — PROGRESS NOTES
As per NK take 2 5 mf Thur/Fri/Sat, then 2mg all other days INR in 2 weeks Bart at [address] [place] [address] is aware DD

## 2019-01-10 LAB — INR PPP: 2.1 (ref 0.86–1.17)

## 2019-01-11 ENCOUNTER — ANTICOAG VISIT (OUTPATIENT)
Dept: FAMILY MEDICINE CLINIC | Facility: HOSPITAL | Age: 84
End: 2019-01-11

## 2019-01-23 ENCOUNTER — OFFICE VISIT (OUTPATIENT)
Dept: FAMILY MEDICINE CLINIC | Facility: HOSPITAL | Age: 84
End: 2019-01-23
Payer: MEDICARE

## 2019-01-23 VITALS
HEIGHT: 63 IN | HEART RATE: 68 BPM | SYSTOLIC BLOOD PRESSURE: 120 MMHG | DIASTOLIC BLOOD PRESSURE: 80 MMHG | OXYGEN SATURATION: 98 % | BODY MASS INDEX: 26.49 KG/M2 | TEMPERATURE: 97.7 F | WEIGHT: 149.5 LBS

## 2019-01-23 DIAGNOSIS — I48.0 PAROXYSMAL ATRIAL FIBRILLATION (HCC): Primary | Chronic | ICD-10-CM

## 2019-01-23 DIAGNOSIS — E66.3 OVERWEIGHT (BMI 25.0-29.9): ICD-10-CM

## 2019-01-23 DIAGNOSIS — I10 ESSENTIAL HYPERTENSION: Chronic | ICD-10-CM

## 2019-01-23 DIAGNOSIS — G40.909 SEIZURE DISORDER (HCC): ICD-10-CM

## 2019-01-23 PROCEDURE — 99214 OFFICE O/P EST MOD 30 MIN: CPT | Performed by: INTERNAL MEDICINE

## 2019-01-23 NOTE — PROGRESS NOTES
Assessment/Plan:    Paroxysmal atrial fibrillation (HCC)  Appears to be in sinus rhythm today, will continue metoprolol and warfarin    Seizure disorder ([facility] [address] )  Pt's nurse reports no seizures, continue keppra    Essential hypertension  BP is well controlled on lopressor       Diagnoses and all orders for this visit:    Paroxysmal atrial fibrillation (HCC)    Seizure disorder ([facility] [address] )    Essential hypertension  -     CBC; Future  -     Comprehensive metabolic panel; Future  -     TSH, 3rd generation with Free T4 reflex; Future          Subjective:      Patient ID: Rafia Salinas is a 80 y o  female  I spoke with pt's nurse at [facility] to get information on patient  She's been stable without seizures, coughing, choking, falls, depression  She participates in acitivities      Atrial Fibrillation   Presents for follow-up visit  Symptoms include weakness  Symptoms are negative for chest pain, palpitations and shortness of breath  The symptoms have been stable  Past medical history includes atrial fibrillation  Hypertension   This is a chronic problem  The current episode started more than 1 year ago  The problem is unchanged  The problem is controlled  Pertinent negatives include no chest pain, palpitations or shortness of breath  The following portions of the patient's history were reviewed and updated as appropriate: current medications, past family history, past medical history, past social history, past surgical history and problem list     Review of Systems   Constitutional: Negative for fever  Respiratory: Negative for cough and shortness of breath  Cardiovascular: Negative for chest pain and palpitations  Gastrointestinal: Positive for constipation  Negative for abdominal pain and blood in stool  Genitourinary: Negative for dysuria, hematuria and vaginal bleeding  Neurological: Positive for speech difficulty and weakness  Psychiatric/Behavioral: Negative for dysphoric mood   The patient is not nervous/anxious  Objective:    /80   Pulse 68   Temp 97 7 °F (36 5 °C) (Tympanic)   Ht 5' 3" (1 6 m)   Wt 67 8 kg (149 lb 8 oz)   LMP  (LMP Unknown)   SpO2 98%   BMI 26 48 kg/m²      Physical Exam   Constitutional: She appears well-developed  No distress  HENT:   Head: Normocephalic  Mouth/Throat: Oropharynx is clear and moist    Eyes: Conjunctivae are normal    Neck: Neck supple  Cardiovascular: Normal rate and regular rhythm  Pulmonary/Chest: Effort normal  No respiratory distress  She has no wheezes  She has no rales  Abdominal: Soft  Bowel sounds are normal  She exhibits no distension  There is no tenderness  Musculoskeletal: She exhibits no tenderness  Lymphadenopathy:     She has no cervical adenopathy  Neurological: She is alert  Expressive aphasia, RLE weakness   Skin: Skin is warm and dry  No rash noted  Psychiatric: She has a normal mood and affect  Vitals reviewed  Gabino Amaro MD    BMI Counseling: Body mass index is 26 48 kg/m²  Discussed the patient's BMI with her  The BMI is above average  No BMI follow-up plan is appropriate  Patient is 72 years old and weight reduction/weight gain would further complicate their underlying physical disability

## 2019-01-23 NOTE — PATIENT INSTRUCTIONS

## 2019-02-07 LAB — INR PPP: 2.8 (ref 0.86–1.17)

## 2019-02-08 ENCOUNTER — ANTICOAG VISIT (OUTPATIENT)
Dept: FAMILY MEDICINE CLINIC | Facility: HOSPITAL | Age: 84
End: 2019-02-08

## 2019-03-07 LAB — INR PPP: 3 (ref 0.86–1.17)

## 2019-03-08 ENCOUNTER — ANTICOAG VISIT (OUTPATIENT)
Dept: FAMILY MEDICINE CLINIC | Facility: HOSPITAL | Age: 84
End: 2019-03-08

## 2019-04-04 LAB
INR PPP: 3.1 (ref 0.86–1.17)
INR PPP: 3.1 (ref 0.86–1.17)

## 2019-04-05 ENCOUNTER — ANTICOAG VISIT (OUTPATIENT)
Dept: FAMILY MEDICINE CLINIC | Facility: HOSPITAL | Age: 84
End: 2019-04-05

## 2019-04-12 ENCOUNTER — APPOINTMENT (EMERGENCY)
Dept: RADIOLOGY | Facility: HOSPITAL | Age: 84
End: 2019-04-12
Payer: MEDICARE

## 2019-04-12 ENCOUNTER — TELEPHONE (OUTPATIENT)
Dept: FAMILY MEDICINE CLINIC | Facility: HOSPITAL | Age: 84
End: 2019-04-12

## 2019-04-12 ENCOUNTER — HOSPITAL ENCOUNTER (EMERGENCY)
Facility: HOSPITAL | Age: 84
Discharge: HOME | End: 2019-04-12
Attending: EMERGENCY MEDICINE | Admitting: EMERGENCY MEDICINE
Payer: MEDICARE

## 2019-04-12 ENCOUNTER — APPOINTMENT (EMERGENCY)
Dept: CT IMAGING | Facility: HOSPITAL | Age: 84
End: 2019-04-12
Payer: MEDICARE

## 2019-04-12 VITALS
WEIGHT: 160 LBS | HEIGHT: 67 IN | DIASTOLIC BLOOD PRESSURE: 62 MMHG | HEART RATE: 75 BPM | OXYGEN SATURATION: 95 % | BODY MASS INDEX: 25.11 KG/M2 | SYSTOLIC BLOOD PRESSURE: 135 MMHG | TEMPERATURE: 97.6 F | RESPIRATION RATE: 20 BRPM

## 2019-04-12 DIAGNOSIS — S06.0X9A CONCUSSION: Primary | ICD-10-CM

## 2019-04-12 LAB
ALBUMIN SERPL BCP-MCNC: 2.9 G/DL (ref 3.5–5)
ALP SERPL-CCNC: 105 U/L (ref 46–116)
ALT SERPL W P-5'-P-CCNC: 14 U/L (ref 12–78)
ANION GAP SERPL CALCULATED.3IONS-SCNC: 7 MMOL/L (ref 4–13)
APTT PPP: 38 SECONDS (ref 26–38)
AST SERPL W P-5'-P-CCNC: 17 U/L (ref 5–45)
BASOPHILS # BLD AUTO: 0.02 THOUSANDS/ΜL (ref 0–0.1)
BASOPHILS NFR BLD AUTO: 0 % (ref 0–1)
BILIRUB SERPL-MCNC: 1 MG/DL (ref 0.2–1)
BUN SERPL-MCNC: 13 MG/DL (ref 5–25)
CALCIUM SERPL-MCNC: 9.2 MG/DL (ref 8.3–10.1)
CHLORIDE SERPL-SCNC: 107 MMOL/L (ref 100–108)
CO2 SERPL-SCNC: 27 MMOL/L (ref 21–32)
CREAT SERPL-MCNC: 0.47 MG/DL (ref 0.6–1.3)
EOSINOPHIL # BLD AUTO: 0.09 THOUSAND/ΜL (ref 0–0.61)
EOSINOPHIL NFR BLD AUTO: 2 % (ref 0–6)
ERYTHROCYTE [DISTWIDTH] IN BLOOD BY AUTOMATED COUNT: 13.6 % (ref 11.6–15.1)
GFR SERPL CREATININE-BSD FRML MDRD: 92 ML/MIN/1.73SQ M
GLUCOSE SERPL-MCNC: 107 MG/DL (ref 65–140)
HCT VFR BLD AUTO: 40.5 % (ref 34.8–46.1)
HGB BLD-MCNC: 13.4 G/DL (ref 11.5–15.4)
IMM GRANULOCYTES # BLD AUTO: 0.02 THOUSAND/UL (ref 0–0.2)
IMM GRANULOCYTES NFR BLD AUTO: 0 % (ref 0–2)
INR PPP: 2.6 (ref 0.86–1.17)
LYMPHOCYTES # BLD AUTO: 1.4 THOUSANDS/ΜL (ref 0.6–4.47)
LYMPHOCYTES NFR BLD AUTO: 24 % (ref 14–44)
MCH RBC QN AUTO: 29.1 PG (ref 26.8–34.3)
MCHC RBC AUTO-ENTMCNC: 33.1 G/DL (ref 31.4–37.4)
MCV RBC AUTO: 88 FL (ref 82–98)
MONOCYTES # BLD AUTO: 0.37 THOUSAND/ΜL (ref 0.17–1.22)
MONOCYTES NFR BLD AUTO: 6 % (ref 4–12)
NEUTROPHILS # BLD AUTO: 3.92 THOUSANDS/ΜL (ref 1.85–7.62)
NEUTS SEG NFR BLD AUTO: 68 % (ref 43–75)
NRBC BLD AUTO-RTO: 0 /100 WBCS
PLATELET # BLD AUTO: 175 THOUSANDS/UL (ref 149–390)
PMV BLD AUTO: 9.7 FL (ref 8.9–12.7)
POTASSIUM SERPL-SCNC: 4.3 MMOL/L (ref 3.5–5.3)
PROT SERPL-MCNC: 6.5 G/DL (ref 6.4–8.2)
PROTHROMBIN TIME: 26.4 SECONDS (ref 11.8–14.2)
RBC # BLD AUTO: 4.61 MILLION/UL (ref 3.81–5.12)
SODIUM SERPL-SCNC: 141 MMOL/L (ref 136–145)
WBC # BLD AUTO: 5.82 THOUSAND/UL (ref 4.31–10.16)

## 2019-04-12 PROCEDURE — 80053 COMPREHEN METABOLIC PANEL: CPT | Performed by: EMERGENCY MEDICINE

## 2019-04-12 PROCEDURE — 93005 ELECTROCARDIOGRAM TRACING: CPT

## 2019-04-12 PROCEDURE — 73502 X-RAY EXAM HIP UNI 2-3 VIEWS: CPT

## 2019-04-12 PROCEDURE — 36415 COLL VENOUS BLD VENIPUNCTURE: CPT | Performed by: EMERGENCY MEDICINE

## 2019-04-12 PROCEDURE — 99285 EMERGENCY DEPT VISIT HI MDM: CPT

## 2019-04-12 PROCEDURE — 99284 EMERGENCY DEPT VISIT MOD MDM: CPT | Performed by: EMERGENCY MEDICINE

## 2019-04-12 PROCEDURE — 85610 PROTHROMBIN TIME: CPT | Performed by: EMERGENCY MEDICINE

## 2019-04-12 PROCEDURE — 70450 CT HEAD/BRAIN W/O DYE: CPT

## 2019-04-12 PROCEDURE — 85730 THROMBOPLASTIN TIME PARTIAL: CPT | Performed by: EMERGENCY MEDICINE

## 2019-04-12 PROCEDURE — 71045 X-RAY EXAM CHEST 1 VIEW: CPT

## 2019-04-12 PROCEDURE — 85025 COMPLETE CBC W/AUTO DIFF WBC: CPT | Performed by: EMERGENCY MEDICINE

## 2019-04-12 RX ORDER — ACETAMINOPHEN 325 MG/1
650 TABLET ORAL ONCE
Status: COMPLETED | OUTPATIENT
Start: 2019-04-12 | End: 2019-04-12

## 2019-04-12 RX ADMIN — ACETAMINOPHEN 650 MG: 325 TABLET ORAL at 10:10

## 2019-04-14 LAB
ATRIAL RATE: 64 BPM
P AXIS: -13 DEGREES
PR INTERVAL: 160 MS
QRS AXIS: 23 DEGREES
QRSD INTERVAL: 82 MS
QT INTERVAL: 440 MS
QTC INTERVAL: 453 MS
T WAVE AXIS: 54 DEGREES
VENTRICULAR RATE: 64 BPM

## 2019-04-14 PROCEDURE — 93010 ELECTROCARDIOGRAM REPORT: CPT | Performed by: INTERNAL MEDICINE

## 2019-04-18 LAB — INR PPP: 2.8 (ref 0.86–1.17)

## 2019-04-19 ENCOUNTER — ANTICOAG VISIT (OUTPATIENT)
Dept: FAMILY MEDICINE CLINIC | Facility: HOSPITAL | Age: 84
End: 2019-04-19

## 2019-04-19 DIAGNOSIS — R79.89 LOW TSH LEVEL: Primary | ICD-10-CM

## 2019-04-19 DIAGNOSIS — E53.8 B12 DEFICIENCY: ICD-10-CM

## 2019-04-23 ENCOUNTER — OFFICE VISIT (OUTPATIENT)
Dept: FAMILY MEDICINE CLINIC | Facility: HOSPITAL | Age: 84
End: 2019-04-23
Payer: MEDICARE

## 2019-04-23 VITALS — HEART RATE: 69 BPM | SYSTOLIC BLOOD PRESSURE: 122 MMHG | DIASTOLIC BLOOD PRESSURE: 62 MMHG | RESPIRATION RATE: 16 BRPM

## 2019-04-23 DIAGNOSIS — I48.91 ATRIAL FIBRILLATION, UNSPECIFIED TYPE (HCC): Primary | ICD-10-CM

## 2019-04-23 DIAGNOSIS — Z91.81 HISTORY OF RECENT FALL: ICD-10-CM

## 2019-04-23 DIAGNOSIS — E03.9 HYPOTHYROIDISM, UNSPECIFIED TYPE: ICD-10-CM

## 2019-04-23 DIAGNOSIS — R26.2 AMBULATORY DYSFUNCTION: ICD-10-CM

## 2019-04-23 PROCEDURE — 99213 OFFICE O/P EST LOW 20 MIN: CPT | Performed by: PHYSICIAN ASSISTANT

## 2019-04-23 RX ORDER — WARFARIN SODIUM 2 MG/1
TABLET ORAL
COMMUNITY
End: 2019-08-28 | Stop reason: ALTCHOICE

## 2019-04-25 ENCOUNTER — HOSPITAL ENCOUNTER (OUTPATIENT)
Dept: ULTRASOUND IMAGING | Facility: HOSPITAL | Age: 84
Discharge: HOME | End: 2019-04-25
Payer: MEDICARE

## 2019-04-25 DIAGNOSIS — E03.9 HYPOTHYROIDISM, UNSPECIFIED TYPE: ICD-10-CM

## 2019-04-25 PROCEDURE — 76536 US EXAM OF HEAD AND NECK: CPT

## 2019-04-30 ENCOUNTER — TELEPHONE (OUTPATIENT)
Dept: FAMILY MEDICINE CLINIC | Facility: HOSPITAL | Age: 84
End: 2019-04-30

## 2019-04-30 PROBLEM — E04.1 THYROID NODULE: Status: ACTIVE | Noted: 2019-04-30

## 2019-05-03 ENCOUNTER — TELEPHONE (OUTPATIENT)
Dept: FAMILY MEDICINE CLINIC | Facility: HOSPITAL | Age: 84
End: 2019-05-03

## 2019-05-03 DIAGNOSIS — E05.90 HYPERTHYROIDISM: Primary | ICD-10-CM

## 2019-05-06 ENCOUNTER — TELEPHONE (OUTPATIENT)
Dept: FAMILY MEDICINE CLINIC | Facility: HOSPITAL | Age: 84
End: 2019-05-06

## 2019-05-06 DIAGNOSIS — J06.9 UPPER RESPIRATORY TRACT INFECTION, UNSPECIFIED TYPE: Primary | ICD-10-CM

## 2019-05-06 RX ORDER — AZITHROMYCIN 250 MG/1
TABLET, FILM COATED ORAL
Qty: 6 TABLET | Refills: 0 | Status: SHIPPED | OUTPATIENT
Start: 2019-05-06 | End: 2019-05-10

## 2019-05-07 ENCOUNTER — TELEPHONE (OUTPATIENT)
Dept: FAMILY MEDICINE CLINIC | Facility: HOSPITAL | Age: 84
End: 2019-05-07

## 2019-05-13 LAB — INR PPP: 7.1 (ref 0.86–1.17)

## 2019-05-14 ENCOUNTER — ANTICOAG VISIT (OUTPATIENT)
Dept: FAMILY MEDICINE CLINIC | Facility: HOSPITAL | Age: 84
End: 2019-05-14

## 2019-05-14 ENCOUNTER — TELEPHONE (OUTPATIENT)
Dept: OTHER | Facility: OTHER | Age: 84
End: 2019-05-14

## 2019-05-14 ENCOUNTER — DOCUMENTATION (OUTPATIENT)
Dept: FAMILY MEDICINE CLINIC | Facility: HOSPITAL | Age: 84
End: 2019-05-14

## 2019-05-16 LAB — INR PPP: 3.5 (ref 0.86–1.17)

## 2019-05-17 ENCOUNTER — TELEPHONE (OUTPATIENT)
Dept: FAMILY MEDICINE CLINIC | Facility: HOSPITAL | Age: 84
End: 2019-05-17

## 2019-05-17 ENCOUNTER — ANTICOAG VISIT (OUTPATIENT)
Dept: FAMILY MEDICINE CLINIC | Facility: HOSPITAL | Age: 84
End: 2019-05-17

## 2019-05-17 DIAGNOSIS — R31.9 URINARY TRACT INFECTION WITH HEMATURIA, SITE UNSPECIFIED: Primary | ICD-10-CM

## 2019-05-17 DIAGNOSIS — N39.0 URINARY TRACT INFECTION WITH HEMATURIA, SITE UNSPECIFIED: Primary | ICD-10-CM

## 2019-05-17 RX ORDER — SULFAMETHOXAZOLE AND TRIMETHOPRIM 800; 160 MG/1; MG/1
1 TABLET ORAL 2 TIMES DAILY
Qty: 20 TABLET | Refills: 0 | Status: SHIPPED | OUTPATIENT
Start: 2019-05-17 | End: 2019-05-27

## 2019-05-20 LAB — INR PPP: 1.6 (ref 0.86–1.17)

## 2019-05-21 ENCOUNTER — ANTICOAG VISIT (OUTPATIENT)
Dept: FAMILY MEDICINE CLINIC | Facility: HOSPITAL | Age: 84
End: 2019-05-21

## 2019-05-21 ENCOUNTER — TELEPHONE (OUTPATIENT)
Dept: FAMILY MEDICINE CLINIC | Facility: HOSPITAL | Age: 84
End: 2019-05-21

## 2019-05-28 ENCOUNTER — OFFICE VISIT (OUTPATIENT)
Dept: FAMILY MEDICINE CLINIC | Facility: HOSPITAL | Age: 84
End: 2019-05-28
Payer: MEDICARE

## 2019-05-28 VITALS
BODY MASS INDEX: 22.52 KG/M2 | OXYGEN SATURATION: 96 % | HEIGHT: 67 IN | SYSTOLIC BLOOD PRESSURE: 112 MMHG | HEART RATE: 65 BPM | DIASTOLIC BLOOD PRESSURE: 60 MMHG | WEIGHT: 143.5 LBS

## 2019-05-28 DIAGNOSIS — E05.90 HYPERTHYROIDISM: ICD-10-CM

## 2019-05-28 DIAGNOSIS — Z00.00 MEDICARE ANNUAL WELLNESS VISIT, SUBSEQUENT: ICD-10-CM

## 2019-05-28 DIAGNOSIS — I48.0 PAROXYSMAL ATRIAL FIBRILLATION (HCC): Primary | Chronic | ICD-10-CM

## 2019-05-28 DIAGNOSIS — I10 ESSENTIAL HYPERTENSION: Chronic | ICD-10-CM

## 2019-05-28 PROCEDURE — 99213 OFFICE O/P EST LOW 20 MIN: CPT | Performed by: INTERNAL MEDICINE

## 2019-05-28 PROCEDURE — G0439 PPPS, SUBSEQ VISIT: HCPCS | Performed by: INTERNAL MEDICINE

## 2019-06-10 ENCOUNTER — TELEPHONE (OUTPATIENT)
Dept: FAMILY MEDICINE CLINIC | Facility: HOSPITAL | Age: 84
End: 2019-06-10

## 2019-06-11 ENCOUNTER — TELEPHONE (OUTPATIENT)
Dept: FAMILY MEDICINE CLINIC | Facility: HOSPITAL | Age: 84
End: 2019-06-11

## 2019-08-28 ENCOUNTER — HOSPITAL ENCOUNTER (OUTPATIENT)
Dept: RADIOLOGY | Facility: HOSPITAL | Age: 84
Discharge: HOME | End: 2019-08-28
Attending: INTERNAL MEDICINE
Payer: MEDICARE

## 2019-08-28 ENCOUNTER — OFFICE VISIT (OUTPATIENT)
Dept: FAMILY MEDICINE CLINIC | Facility: HOSPITAL | Age: 84
End: 2019-08-28
Payer: MEDICARE

## 2019-08-28 VITALS
OXYGEN SATURATION: 98 % | DIASTOLIC BLOOD PRESSURE: 70 MMHG | SYSTOLIC BLOOD PRESSURE: 118 MMHG | HEART RATE: 84 BPM | RESPIRATION RATE: 14 BRPM | BODY MASS INDEX: 22.48 KG/M2 | HEIGHT: 67 IN

## 2019-08-28 DIAGNOSIS — M79.674 PAIN OF TOE OF RIGHT FOOT: ICD-10-CM

## 2019-08-28 DIAGNOSIS — E05.90 HYPERTHYROIDISM: ICD-10-CM

## 2019-08-28 DIAGNOSIS — I48.0 PAROXYSMAL ATRIAL FIBRILLATION (HCC): Primary | Chronic | ICD-10-CM

## 2019-08-28 PROCEDURE — 73660 X-RAY EXAM OF TOE(S): CPT

## 2019-08-28 PROCEDURE — 99214 OFFICE O/P EST MOD 30 MIN: CPT | Performed by: INTERNAL MEDICINE

## 2019-08-28 RX ORDER — DOXYCYCLINE HYCLATE 100 MG/1
100 TABLET, DELAYED RELEASE ORAL 2 TIMES DAILY
COMMUNITY
End: 2019-09-30 | Stop reason: SDUPTHER

## 2019-08-28 NOTE — ASSESSMENT & PLAN NOTE
Currently sounds like she's in nsr, continue lopressor, refused to take anticoagulation for cva prophylaxis

## 2019-08-28 NOTE — PROGRESS NOTES
Assessment/Plan:    Hyperthyroidism  Refused thyroid scan    Paroxysmal atrial fibrillation (HCC)  Currently sounds like she's in nsr, continue lopressor, refused to take anticoagulation for cva prophylaxis       Diagnoses and all orders for this visit:    Paroxysmal atrial fibrillation (HCC)    Hyperthyroidism    Pain of toe of right foot  -     XR toe right second min 2 views; Future  -     Ambulatory referral to Podiatry; Future    Other orders  -     doxycycline (DORYX) 100 MG EC tablet; Take 100 mg by mouth 2 (two) times a day          Subjective:      Patient ID: Shell Odell is a 80 y o  female  Spoke with pt's RN at [facility] who told me that pt received doxycycline without side effects for right 2nd toe infection  Pt c/o pain at the same toe going on for about a week  Doesn't wear shoes on left foot and doesn't walk  Pt has right hemiparesis post cva  RN reports that pt refused to take warfarin therefore it was discontinued, she also refused to have a thyroid scan that I ordered for hyperthyroidism  I also attempted to speak with son, Ivone Carson who was driving and will call me back        The following portions of the patient's history were reviewed and updated as appropriate: current medications, past family history, past medical history, past social history, past surgical history and problem list     Review of Systems   Constitutional: Negative for chills and fever  Respiratory: Negative for cough and shortness of breath  Cardiovascular: Negative for chest pain and palpitations  Gastrointestinal: Negative for abdominal pain  Genitourinary: Negative for dysuria  Skin: Positive for rash  Neurological: Positive for weakness  Psychiatric/Behavioral: Negative for dysphoric mood  Objective:    /70   Pulse 84   Resp 14   Ht 5' 7" (1 702 m)   LMP  (LMP Unknown)   SpO2 98%   BMI 22 48 kg/m²      Physical Exam   HENT:   Head: Normocephalic     Cardiovascular: Normal rate  Pulmonary/Chest: Effort normal and breath sounds normal  She has no wheezes  She has no rales  Abdominal: Soft  There is no tenderness  Musculoskeletal: She exhibits tenderness (rigth 2nd toe PIP joint is tender to touch, not hot)  Neurological: She is alert     Expressive aphasia, RLE weakness chronic   Skin:   2mm shallow ulcer over right 2nd toe PIP joint with minimal erythema surrounding it           Sheryl Fernandez MD

## 2019-08-29 DIAGNOSIS — I48.0 PAROXYSMAL ATRIAL FIBRILLATION (HCC): Primary | Chronic | ICD-10-CM

## 2019-08-29 NOTE — PROGRESS NOTES
I discussed the case with pt's son Charles Soler on the phone and explained the rationale for anticoagulation to prevent bae and thyroid scan to evaluate hyperthyroidism and the fact that pt refused warfarin and thyroid scan  Will see if xarelto is affordable or not, printed rx    Will not do further workup with thyroid scan since pt refused it  - son agrees with that

## 2019-09-27 DIAGNOSIS — S81.801A NON-HEALING WOUND OF LOWER EXTREMITY, RIGHT, INITIAL ENCOUNTER: Primary | ICD-10-CM

## 2019-09-30 DIAGNOSIS — L08.9 INFECTED WOUND: Primary | ICD-10-CM

## 2019-09-30 DIAGNOSIS — T14.8XXA INFECTED WOUND: Primary | ICD-10-CM

## 2019-09-30 RX ORDER — DOXYCYCLINE HYCLATE 100 MG/1
100 TABLET, DELAYED RELEASE ORAL 2 TIMES DAILY
Qty: 20 TABLET | Refills: 0 | Status: SHIPPED | OUTPATIENT
Start: 2019-09-30 | End: 2019-10-10

## 2019-10-07 ENCOUNTER — APPOINTMENT (OUTPATIENT)
Dept: WOUND CARE | Facility: HOSPITAL | Age: 84
End: 2019-10-07
Payer: MEDICARE

## 2019-10-07 PROCEDURE — 99213 OFFICE O/P EST LOW 20 MIN: CPT

## 2019-10-11 DIAGNOSIS — E05.90 HYPERTHYROIDISM: Primary | ICD-10-CM

## 2019-10-11 RX ORDER — METHIMAZOLE 5 MG/1
5 TABLET ORAL 2 TIMES DAILY
Qty: 60 TABLET | Refills: 5
Start: 2019-10-11 | End: 2019-11-07

## 2019-10-11 NOTE — PROGRESS NOTES
Patient's labs Show hyperthyroidism  I spoke with patient's nurse at [facility] who told me that patient refuses to have thyroid scan done      Her CBC and LFTs are normal     I am starting patient on methimazole 5 mg p o  B i d  For hyperthyroidism that will not be adequately diagnosed with imaging due to patient's refusal and will check CBC, CMP and a free T3 and free T4 in 4 weeks

## 2019-10-16 DIAGNOSIS — I48.0 PAROXYSMAL ATRIAL FIBRILLATION (HCC): Chronic | ICD-10-CM

## 2019-10-17 ENCOUNTER — APPOINTMENT (OUTPATIENT)
Dept: WOUND CARE | Facility: HOSPITAL | Age: 84
End: 2019-10-17
Payer: MEDICARE

## 2019-10-17 PROCEDURE — 99212 OFFICE O/P EST SF 10 MIN: CPT

## 2019-10-17 RX ORDER — RIVAROXABAN 20 MG/1
TABLET, FILM COATED ORAL
Qty: 28 TABLET | Refills: 4 | Status: SHIPPED | OUTPATIENT
Start: 2019-10-17 | End: 2020-02-29

## 2019-10-20 ENCOUNTER — TELEPHONE (OUTPATIENT)
Dept: FAMILY MEDICINE CLINIC | Facility: HOSPITAL | Age: 84
End: 2019-10-20

## 2019-10-20 NOTE — TELEPHONE ENCOUNTER
I spoke with pt's RN at [facility] who reported that pt has no mental status change, fever, urinary frequency/urgency/dysuria  Will repeat clean catch UA  She may have aymptomatic bacteriuria  Her condition is the same  I will not treat urine results at this point!

## 2019-10-28 ENCOUNTER — TELEPHONE (OUTPATIENT)
Dept: FAMILY MEDICINE CLINIC | Facility: HOSPITAL | Age: 84
End: 2019-10-28

## 2019-10-28 NOTE — TELEPHONE ENCOUNTER
Amber from [facility] called  The patient is being discharged from homecare  Her toe wound has healed

## 2019-11-07 ENCOUNTER — APPOINTMENT (OUTPATIENT)
Dept: WOUND CARE | Facility: HOSPITAL | Age: 84
End: 2019-11-07
Payer: MEDICARE

## 2019-11-07 DIAGNOSIS — E05.90 HYPERTHYROIDISM: ICD-10-CM

## 2019-11-07 PROCEDURE — 99212 OFFICE O/P EST SF 10 MIN: CPT

## 2019-11-07 RX ORDER — METHIMAZOLE 5 MG/1
5 TABLET ORAL 3 TIMES DAILY
Qty: 90 TABLET | Refills: 5 | Status: SHIPPED | OUTPATIENT
Start: 2019-11-07 | End: 2019-12-06

## 2019-11-07 RX ORDER — METHIMAZOLE 5 MG/1
5 TABLET ORAL 3 TIMES DAILY
Qty: 90 TABLET | Refills: 5
Start: 2019-11-07 | End: 2019-11-07 | Stop reason: SDUPTHER

## 2019-11-27 ENCOUNTER — HOSPITAL ENCOUNTER (EMERGENCY)
Facility: HOSPITAL | Age: 84
Discharge: HOME | End: 2019-11-27
Attending: EMERGENCY MEDICINE | Admitting: EMERGENCY MEDICINE
Payer: MEDICARE

## 2019-11-27 ENCOUNTER — APPOINTMENT (EMERGENCY)
Dept: CT IMAGING | Facility: HOSPITAL | Age: 84
End: 2019-11-27
Payer: MEDICARE

## 2019-11-27 ENCOUNTER — APPOINTMENT (EMERGENCY)
Dept: RADIOLOGY | Facility: HOSPITAL | Age: 84
End: 2019-11-27
Payer: MEDICARE

## 2019-11-27 VITALS
WEIGHT: 153.88 LBS | RESPIRATION RATE: 19 BRPM | HEART RATE: 70 BPM | SYSTOLIC BLOOD PRESSURE: 124 MMHG | BODY MASS INDEX: 24.1 KG/M2 | OXYGEN SATURATION: 97 % | TEMPERATURE: 97.4 F | DIASTOLIC BLOOD PRESSURE: 58 MMHG
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
## 2019-11-27 DIAGNOSIS — S20.211A CHEST WALL CONTUSION, RIGHT, INITIAL ENCOUNTER: ICD-10-CM

## 2019-11-27 DIAGNOSIS — S63.501A SPRAIN OF RIGHT WRIST, INITIAL ENCOUNTER: ICD-10-CM

## 2019-11-27 LAB
ALBUMIN SERPL BCP-MCNC: 3 G/DL (ref 3.5–5)
ALP SERPL-CCNC: 138 U/L (ref 46–116)
ALT SERPL W P-5'-P-CCNC: 14 U/L (ref 12–78)
ANION GAP SERPL CALCULATED.3IONS-SCNC: 9 MMOL/L (ref 4–13)
AST SERPL W P-5'-P-CCNC: 11 U/L (ref 5–45)
BASOPHILS # BLD AUTO: 0.01 THOUSANDS/ΜL (ref 0–0.1)
BASOPHILS NFR BLD AUTO: 0 % (ref 0–1)
BILIRUB SERPL-MCNC: 0.5 MG/DL (ref 0.2–1)
BUN SERPL-MCNC: 17 MG/DL (ref 5–25)
CALCIUM SERPL-MCNC: 9.3 MG/DL (ref 8.3–10.1)
CHLORIDE SERPL-SCNC: 105 MMOL/L (ref 100–108)
CO2 SERPL-SCNC: 27 MMOL/L (ref 21–32)
CREAT SERPL-MCNC: 0.66 MG/DL (ref 0.6–1.3)
EOSINOPHIL # BLD AUTO: 0.1 THOUSAND/ΜL (ref 0–0.61)
EOSINOPHIL NFR BLD AUTO: 2 % (ref 0–6)
ERYTHROCYTE [DISTWIDTH] IN BLOOD BY AUTOMATED COUNT: 14.4 % (ref 11.6–15.1)
GFR SERPL CREATININE-BSD FRML MDRD: 81 ML/MIN/1.73SQ M
GLUCOSE SERPL-MCNC: 114 MG/DL (ref 65–140)
HCT VFR BLD AUTO: 39.4 % (ref 34.8–46.1)
HGB BLD-MCNC: 12.7 G/DL (ref 11.5–15.4)
IMM GRANULOCYTES # BLD AUTO: 0 THOUSAND/UL (ref 0–0.2)
IMM GRANULOCYTES NFR BLD AUTO: 0 % (ref 0–2)
LYMPHOCYTES # BLD AUTO: 1.64 THOUSANDS/ΜL (ref 0.6–4.47)
LYMPHOCYTES NFR BLD AUTO: 28 % (ref 14–44)
MCH RBC QN AUTO: 27.9 PG (ref 26.8–34.3)
MCHC RBC AUTO-ENTMCNC: 32.2 G/DL (ref 31.4–37.4)
MCV RBC AUTO: 87 FL (ref 82–98)
MONOCYTES # BLD AUTO: 0.5 THOUSAND/ΜL (ref 0.17–1.22)
MONOCYTES NFR BLD AUTO: 9 % (ref 4–12)
NEUTROPHILS # BLD AUTO: 3.6 THOUSANDS/ΜL (ref 1.85–7.62)
NEUTS SEG NFR BLD AUTO: 61 % (ref 43–75)
PLATELET # BLD AUTO: 217 THOUSANDS/UL (ref 149–390)
PMV BLD AUTO: 9.7 FL (ref 8.9–12.7)
POTASSIUM SERPL-SCNC: 4.4 MMOL/L (ref 3.5–5.3)
PROT SERPL-MCNC: 7.6 G/DL (ref 6.4–8.2)
RBC # BLD AUTO: 4.55 MILLION/UL (ref 3.81–5.12)
SODIUM SERPL-SCNC: 141 MMOL/L (ref 136–145)
WBC # BLD AUTO: 5.85 THOUSAND/UL (ref 4.31–10.16)

## 2019-11-27 PROCEDURE — 85025 COMPLETE CBC W/AUTO DIFF WBC: CPT | Performed by: PHYSICIAN ASSISTANT

## 2019-11-27 PROCEDURE — 70450 CT HEAD/BRAIN W/O DYE: CPT

## 2019-11-27 PROCEDURE — 36415 COLL VENOUS BLD VENIPUNCTURE: CPT | Performed by: PHYSICIAN ASSISTANT

## 2019-11-27 PROCEDURE — 71046 X-RAY EXAM CHEST 2 VIEWS: CPT

## 2019-11-27 PROCEDURE — 99284 EMERGENCY DEPT VISIT MOD MDM: CPT

## 2019-11-27 PROCEDURE — 80053 COMPREHEN METABOLIC PANEL: CPT | Performed by: PHYSICIAN ASSISTANT

## 2019-11-27 PROCEDURE — 99285 EMERGENCY DEPT VISIT HI MDM: CPT | Performed by: PHYSICIAN ASSISTANT

## 2019-11-27 RX ORDER — LIDOCAINE 50 MG/G
1 PATCH TOPICAL ONCE
Status: DISCONTINUED | OUTPATIENT
Start: 2019-11-27 | End: 2019-11-28 | Stop reason: HOSPADM

## 2019-11-27 RX ADMIN — LIDOCAINE 1 PATCH: 50 PATCH TOPICAL at 19:13

## 2019-11-27 NOTE — ED PROVIDER NOTES
H&P Exam - Trauma   Joshua Rodriguez 80 y o  female MRN: [id number]  Unit/Bed#: ED 03/ED 03 Encounter: [id number]    Assessment/Plan   Trauma Alert: Trauma Acuity: Trauma Evaluation  Model of Arrival: Mode of Arrival: BLS via    Trauma Team: Current Providers  Attending Provider: Stephie Alegre DO  Registered Nurse: Lora Bhatt, RN  Physician Assistant: Vinay Yen PA-C  Consultants: None    Trauma Active Problems:   Unwitnessed fall  R chest wall injury  Chronic anticoagulation    Trauma Plan: CTH wo, CXR, CBC, CMP    Chief Complaint:   Chief Complaint   Patient presents with    Fall     fall from bed to floor 4 days ago, on xarelto       History of Present Illness   HPI:  Joshua Rodriguez is a 80 y o  female who presents with R chest wall pain and R arm pain after falling out of bed 4d ago  Pt states she awoke one night on the floor, uncertain how she fell, uncertain if she struck her head  Does take Xarelto  Having pain to the R chest wall and R arm, has not taken any meds for pain  Denies SOB, N/V or dizziness  Mechanism:Details of Incident: slipped out of bed Injury Date: 11/23/19   Injury Occurence Location - [address] [place] [address] Way: [facility]    HPI  Review of Systems   Constitutional: Negative for chills, diaphoresis and fever  Eyes: Negative for visual disturbance  Respiratory: Negative for cough and shortness of breath  Cardiovascular: Negative for chest pain and palpitations  Gastrointestinal: Negative for abdominal pain, diarrhea, nausea and vomiting  Genitourinary: Negative for dysuria, flank pain and frequency  Musculoskeletal: Positive for arthralgias  Negative for myalgias  Skin: Negative for color change, rash and wound  Allergic/Immunologic: Negative for immunocompromised state  Neurological: Negative for dizziness and light-headedness  Hematological: Does not bruise/bleed easily  Psychiatric/Behavioral: Negative for confusion  The patient is not nervous/anxious          Historical Information     Immunizations:   Immunization History   Administered Date(s) Administered    INFLUENZA 10/06/2015, 12/29/2016    Influenza Quadrivalent 3 years and older 10/11/2018    Influenza Split High Dose Preservative Free IM 10/06/2015    Influenza TIV (IM) 10/16/2013       Past Medical History:   Diagnosis Date    A-fib [facility])     Ambulatory dysfunction     Anemia     Aphasia     Falls frequently     GERD (gastroesophageal reflux disease)     Hyperlipidemia     Hypertension     Muscle weakness     Osteoporosis     Seizures (HCC)     Stroke ([address] )     T6 vertebral fracture ([address] )        Family History   Problem Relation Age of Onset    Heart disease Mother     Heart disease Father     Dementia Sister     Mental illness Sister     Substance Abuse Neg Hx         unknown fam hx     Past Surgical History:   Procedure Laterality Date    SC OPEN RX FEMUR FX+INTRAMED PRISCILLA Left 7/25/2016    Procedure: INSERTION NAIL IM FEMUR ANTEGRADE (TROCHANTERIC);   Surgeon: Jeny Proctor MD;  [facility];  Service: Orthopedics    WRIST FRACTURE SURGERY         Social History     Socioeconomic History    Marital status:      Spouse name: None    Number of children: None    Years of education: None    Highest education level: None   Occupational History    Occupation: retired   Social Needs    Financial resource strain: None    Food insecurity:     Worry: None     Inability: None    Transportation needs:     Medical: None     Non-medical: None   Tobacco Use    Smoking status: Never Smoker    Smokeless tobacco: Never Used    Tobacco comment: Per [employer] Former smoker   Substance and Sexual Activity    Alcohol use: No    Drug use: No    Sexual activity: None   Lifestyle    Physical activity:     Days per week: None     Minutes per session: None    Stress: None   Relationships    Social connections:     Talks on phone: None     Gets together: None     Attends [employer] service: None Active member of club or organization: None     Attends meetings of clubs or organizations: None     Relationship status: None    Intimate partner violence:     Fear of current or ex partner: None     Emotionally abused: None     Physically abused: None     Forced sexual activity: None   Other Topics Concern    None   Social History Narrative    Living in an assisted living facility    Per [facility] [address] safe at home in facility  Has living will  Has dentures---sees occas  Family History: non-contributory    Meds/Allergies   Prior to Admission Medications   Prescriptions Last Dose Informant Patient Reported? Taking? Acetaminophen (APAP) 325 MG tablet   Yes No   Sig: Take 650 mg by mouth every 6 (six) hours as needed for mild pain  Cholecalciferol (VITAMIN D PO)   Yes No   Sig: Take 1,000 Int'l Units by mouth daily     Multiple Vitamins-Minerals (PRESERVISION AREDS PO)   Yes No   Sig: Take by mouth   XARELTO 20 MG tablet   No No   Si TAB ORALLY EVERY MORNING DX:BLOOD CLOT PREVENTION *COLD SEAL*   calcium-vitamin D (OSCAL 500 + D) 500 mg-200 units per tablet   Yes No   Sig: Take 1 tablet by mouth daily  lactulose (CEPHULAC) 10 G packet   Yes No   Sig: Take 10 g by mouth daily     levETIRAcetam (KEPPRA) 750 mg tablet   No No   Sig: Take 1 tablet (750 mg total) by mouth 2 (two) times a day   magnesium hydroxide (MILK OF MAGNESIA) 400 mg/5 mL oral suspension   Yes No   Sig: Take 30 mL by mouth daily as needed for constipation   methimazole (TAPAZOLE) 5 mg tablet   No No   Sig: Take 1 tablet (5 mg total) by mouth 3 (three) times a day   metoprolol tartrate (LOPRESSOR) 50 mg tablet   No No   Si TAB ORALLY EVERY 8 HOURS AT [id number] DX: HYPERTENSION **FULL CARD**      Facility-Administered Medications: None       Allergies   Allergen Reactions    Ceftriaxone     Codeine     Levofloxacin Other (See Comments)       PHYSICAL EXAM    PE limited by: n/a    Objective   Vitals: First set: Temperature: 99 3 °F (37 4 °C) (11/27/19 1723)  Pulse: 77 (11/27/19 1723)  Respirations: 16 (11/27/19 1723)  Blood Pressure: 133/60 (11/27/19 1723)  SpO2: 97 % (11/27/19 1723)    Primary Survey:   (A) Airway: Intact, self maintained  (B) Breathing: Regular, unlabored  (C) Circulation: Pulses:   radial  2/4  (D) Disabliity:  GCS Total:  15  (E) Expose:  Completed    Secondary Survey: (Click on Physical Exam tab above)  Physical Exam   Constitutional: She is oriented to person, place, and time  She appears well-developed and well-nourished  No distress  HENT:   Head: Normocephalic and atraumatic  Mouth/Throat: Oropharynx is clear and moist    Eyes: Pupils are equal, round, and reactive to light  No scleral icterus  Neck: No JVD present  Cardiovascular: Normal rate and regular rhythm  Exam reveals no gallop and no friction rub  No murmur heard  Pulmonary/Chest: No respiratory distress  She has no wheezes  She has no rales  Minor ecchymosis to R chest wall; no crepitus, deformity or abnormal chest wall motion with inspiration   Abdominal: Soft  Bowel sounds are normal  She exhibits no distension and no mass  There is no tenderness  There is no rebound and no guarding  Musculoskeletal: She exhibits no edema  R wrist: No visible deformities or open wounds  Exhibits normal ROM   Neurological: She is alert and oriented to person, place, and time  Skin: Skin is warm and dry  Capillary refill takes less than 2 seconds  She is not diaphoretic  No pallor  Psychiatric: She has a normal mood and affect  Her behavior is normal    Vitals reviewed        Invasive Devices     Peripheral Intravenous Line            Peripheral IV 11/27/19 Left Antecubital less than 1 day                Lab Results:   Results Reviewed     Procedure Component Value Units Date/Time    Comprehensive metabolic panel [[id number]]  (Abnormal) Collected:  11/27/19 1738    Lab Status:  Final result Specimen:  Blood from Arm, Left Updated:  11/27/19 1856     Sodium 141 mmol/L      Potassium 4 4 mmol/L      Chloride 105 mmol/L      CO2 27 mmol/L      ANION GAP 9 mmol/L      BUN 17 mg/dL      Creatinine 0 66 mg/dL      Glucose 114 mg/dL      Calcium 9 3 mg/dL      AST 11 U/L      ALT 14 U/L      Alkaline Phosphatase 138 U/L      Total Protein 7 6 g/dL      Albumin 3 0 g/dL      Total Bilirubin 0 50 mg/dL      eGFR 81 ml/min/1 73sq m     Narrative:       [place] guidelines for Chronic Kidney Disease (CKD):     Stage 1 with normal or high GFR (GFR > 90 mL/min/1 73 square meters)    Stage 2 Mild CKD (GFR = 60-89 mL/min/1 73 square meters)    Stage 3A Moderate CKD (GFR = 45-59 mL/min/1 73 square meters)    Stage 3B Moderate CKD (GFR = 30-44 mL/min/1 73 square meters)    Stage 4 Severe CKD (GFR = 15-29 mL/min/1 73 square meters)    Stage 5 End Stage CKD (GFR <15 mL/min/1 73 square meters)  Note: GFR calculation is accurate only with a steady state creatinine    CBC and differential [[id number]]  (Normal) Collected:  11/27/19 1738    Lab Status:  Final result Specimen:  Blood from Arm, Left Updated:  11/27/19 1814     WBC 5 85 Thousand/uL      RBC 4 55 Million/uL      Hemoglobin 12 7 g/dL      Hematocrit 39 4 %      MCV 87 fL      MCH 27 9 pg      MCHC 32 2 g/dL      RDW 14 4 %      MPV 9 7 fL      Platelets 044 Thousands/uL      Neutrophils Relative 61 %      Immat GRANS % 0 %      Lymphocytes Relative 28 %      Monocytes Relative 9 %      Eosinophils Relative 2 %      Basophils Relative 0 %      Neutrophils Absolute 3 60 Thousands/µL      Immature Grans Absolute 0 00 Thousand/uL      Lymphocytes Absolute 1 64 Thousands/µL      Monocytes Absolute 0 50 Thousand/µL      Eosinophils Absolute 0 10 Thousand/µL      Basophils Absolute 0 01 Thousands/µL                  Imaging Studies:   Direct to CT: No  XR chest 2 views   ED Interpretation by Ulisses Keene PA-C (11/27 1802)   Stable elevated L hemidiaphragm, no acute process  Final Result by Brittany Healy MD (11/27 1809)      Retrocardiac air-fluid level in keeping with a large hiatal hernia  Left basilar density is likely atelectasis  Workstation performed: [id number]         CT head without contrast   Final Result by Madiha Garnica DO (11/27 1748)      Stable examination of the brain  Encephalomalacia within the left hemisphere is unchanged, presumably related to old infarction  Workstation performed: [id number]             Other Studies: n/a    Code Status: Prior  Advance Directive and Living Will: Yes    Power of : Yes  POLST:      Procedures  Procedures         ED Course         MDM  Number of Diagnoses or Management Options  Chest wall contusion, right, initial encounter: new and requires workup  Fall, initial encounter: new and requires workup  Sprain of right wrist, initial encounter: new and requires workup  Diagnosis management comments: CTH shows no evidence of acute injury, CXR normal  Labs reassuring  Remained in stable condition throughout ED stay  Amount and/or Complexity of Data Reviewed  Clinical lab tests: ordered and reviewed  Tests in the radiology section of CPT®: ordered and reviewed  Tests in the medicine section of CPT®: ordered and reviewed  Review and summarize past medical records: yes  Independent visualization of images, tracings, or specimens: yes        Disposition  [facility] Transfer: No  Final diagnoses:   Fall, initial encounter   Chest wall contusion, right, initial encounter   Sprain of right wrist, initial encounter     Time reflects when diagnosis was documented in both MDM as applicable and the Disposition within this note     Time User Action Codes Description Comment    11/27/2019  7:00 PM Minnie Lozano Add Melampus Grills  [id number]] Fall, initial encounter     11/27/2019  7:00 PM Minnie Lozano Add [S20 211A] Chest wall contusion, right, initial encounter     11/27/2019  7:01 PM Minnie Lozano Add [V83 501A] Sprain of right wrist, initial encounter       ED Disposition     ED Disposition Condition Date/Time Comment    Discharge Stable Wed Nov 27, 2019  7:00 PM Zan Minors discharge to home/self care  Follow-up Information     Follow up With Specialties Details Why Contact Info    Dev Lopez MD Internal Medicine  As needed [place]  [address] [phone]          Patient's Medications   Discharge Prescriptions    No medications on file     No discharge procedures on file        ED Provider  Electronically Signed by         Ifeoma Russ PA-C  11/27/19 0877

## 2019-11-28 NOTE — ED NOTES
ROSITA will  pt and transport to [facility] at [address]       Berna Barrow RN  11/27/19 0403

## 2019-12-06 DIAGNOSIS — E05.90 HYPERTHYROIDISM: Primary | ICD-10-CM

## 2019-12-06 RX ORDER — METHIMAZOLE 10 MG/1
10 TABLET ORAL 3 TIMES DAILY
Qty: 90 TABLET | Refills: 5
Start: 2019-12-06 | End: 2020-04-24

## 2019-12-06 NOTE — PROGRESS NOTES
I spoke with patient's nurse at [facility] and increased patient's methimazole to 10 mg p o  T i d since free T3 and free T4 are still high     Will recheck her free T4 and free T3 along with CBC and CMP in 1 month

## 2020-01-02 ENCOUNTER — TELEPHONE (OUTPATIENT)
Dept: FAMILY MEDICINE CLINIC | Facility: HOSPITAL | Age: 85
End: 2020-01-02

## 2020-01-02 NOTE — TELEPHONE ENCOUNTER
Polo Pale from [facility] [address] asking for a script for orthopedic shoes for dustin R second toe be faxed to [phone]   Fax attn CBPAN-[phone]  Any questions you can reach [facility] at [phone]

## 2020-01-08 NOTE — PROGRESS NOTES
Spoke with RN at [facility] : pt is not her usual self, sleepier and more confused, had no fever, abdominal pain or urinary complaints but she's aphasic    Urine cxs show proteus, she has unknown allergic reaction to rocephin      Will try augmentin 500/125 tid for a week

## 2020-01-23 RX ORDER — AMOXICILLIN AND CLAVULANATE POTASSIUM 500; 125 MG/1; MG/1
TABLET, FILM COATED ORAL
COMMUNITY
Start: 2020-01-08 | End: 2020-05-15 | Stop reason: HOSPADM

## 2020-02-29 DIAGNOSIS — I48.0 PAROXYSMAL ATRIAL FIBRILLATION (HCC): Chronic | ICD-10-CM

## 2020-02-29 DIAGNOSIS — G40.909 NONINTRACTABLE EPILEPSY WITHOUT STATUS EPILEPTICUS, UNSPECIFIED EPILEPSY TYPE (HCC): ICD-10-CM

## 2020-02-29 RX ORDER — LEVETIRACETAM 750 MG/1
TABLET ORAL
Qty: 56 TABLET | Refills: 4 | Status: SHIPPED | OUTPATIENT
Start: 2020-02-29 | End: 2020-05-15 | Stop reason: HOSPADM

## 2020-02-29 RX ORDER — RIVAROXABAN 20 MG/1
TABLET, FILM COATED ORAL
Qty: 28 TABLET | Refills: 4 | Status: SHIPPED | OUTPATIENT
Start: 2020-02-29 | End: 2020-07-16

## 2020-04-20 DIAGNOSIS — H04.123 DRY EYES: Primary | ICD-10-CM

## 2020-04-24 DIAGNOSIS — E05.90 HYPERTHYROIDISM: ICD-10-CM

## 2020-04-24 RX ORDER — METHIMAZOLE 10 MG/1
TABLET ORAL
Qty: 84 TABLET | Refills: 4 | Status: SHIPPED | OUTPATIENT
Start: 2020-04-24 | End: 2020-09-04

## 2020-04-27 DIAGNOSIS — I10 ESSENTIAL HYPERTENSION: ICD-10-CM

## 2020-04-27 RX ORDER — METOPROLOL TARTRATE 50 MG/1
50 TABLET, FILM COATED ORAL EVERY 8 HOURS
Qty: 84 TABLET | Refills: 4 | Status: SHIPPED | OUTPATIENT
Start: 2020-04-27 | End: 2020-09-04

## 2020-05-11 ENCOUNTER — APPOINTMENT (EMERGENCY)
Dept: CT IMAGING | Facility: HOSPITAL | Age: 85
DRG: 100 | End: 2020-05-11
Payer: MEDICARE

## 2020-05-11 ENCOUNTER — APPOINTMENT (EMERGENCY)
Dept: RADIOLOGY | Facility: HOSPITAL | Age: 85
DRG: 100 | End: 2020-05-11
Payer: MEDICARE

## 2020-05-11 ENCOUNTER — HOSPITAL ENCOUNTER (INPATIENT)
Facility: HOSPITAL | Age: 85
LOS: 3 days | Discharge: NURSING, REHAB OR LONG-TERM CARE | DRG: 100 | End: 2020-05-15
Attending: EMERGENCY MEDICINE | Admitting: HOSPITALIST
Payer: MEDICARE

## 2020-05-11 DIAGNOSIS — N30.01 ACUTE CYSTITIS WITH HEMATURIA: ICD-10-CM

## 2020-05-11 DIAGNOSIS — N39.0 UTI (URINARY TRACT INFECTION): ICD-10-CM

## 2020-05-11 DIAGNOSIS — E27.8 ADRENAL MASS (HCC): ICD-10-CM

## 2020-05-11 DIAGNOSIS — F01.50 VASCULAR DEMENTIA WITHOUT BEHAVIORAL DISTURBANCE (HCC): ICD-10-CM

## 2020-05-11 DIAGNOSIS — G40.909 SEIZURE DISORDER (HCC): ICD-10-CM

## 2020-05-11 DIAGNOSIS — R41.82 ALTERED MENTAL STATUS: ICD-10-CM

## 2020-05-11 DIAGNOSIS — K44.9 HIATAL HERNIA: Primary | ICD-10-CM

## 2020-05-11 LAB
ALBUMIN SERPL BCP-MCNC: 3.5 G/DL (ref 3.5–5)
ALP SERPL-CCNC: 134 U/L (ref 46–116)
ALT SERPL W P-5'-P-CCNC: 13 U/L (ref 12–78)
ANION GAP SERPL CALCULATED.3IONS-SCNC: 8 MMOL/L (ref 4–13)
APTT PPP: 32 SECONDS (ref 23–37)
AST SERPL W P-5'-P-CCNC: 17 U/L (ref 5–45)
BASOPHILS # BLD AUTO: 0.04 THOUSANDS/ΜL (ref 0–0.1)
BASOPHILS NFR BLD AUTO: 0 % (ref 0–1)
BILIRUB SERPL-MCNC: 1.6 MG/DL (ref 0.2–1)
BUN SERPL-MCNC: 15 MG/DL (ref 5–25)
CALCIUM SERPL-MCNC: 9.1 MG/DL (ref 8.3–10.1)
CHLORIDE SERPL-SCNC: 105 MMOL/L (ref 100–108)
CO2 SERPL-SCNC: 29 MMOL/L (ref 21–32)
CREAT SERPL-MCNC: 0.75 MG/DL (ref 0.6–1.3)
EOSINOPHIL # BLD AUTO: 0.04 THOUSAND/ΜL (ref 0–0.61)
EOSINOPHIL NFR BLD AUTO: 0 % (ref 0–6)
ERYTHROCYTE [DISTWIDTH] IN BLOOD BY AUTOMATED COUNT: 14.5 % (ref 11.6–15.1)
GFR SERPL CREATININE-BSD FRML MDRD: 73 ML/MIN/1.73SQ M
GLUCOSE SERPL-MCNC: 91 MG/DL (ref 65–140)
GLUCOSE SERPL-MCNC: 94 MG/DL (ref 65–140)
HCT VFR BLD AUTO: 40.9 % (ref 34.8–46.1)
HGB BLD-MCNC: 13.7 G/DL (ref 11.5–15.4)
IMM GRANULOCYTES # BLD AUTO: 0.03 THOUSAND/UL (ref 0–0.2)
IMM GRANULOCYTES NFR BLD AUTO: 0 % (ref 0–2)
INR PPP: 1.1 (ref 0.84–1.19)
LACTATE SERPL-SCNC: 1.3 MMOL/L (ref 0.5–2)
LYMPHOCYTES # BLD AUTO: 2.4 THOUSANDS/ΜL (ref 0.6–4.47)
LYMPHOCYTES NFR BLD AUTO: 25 % (ref 14–44)
MCH RBC QN AUTO: 30.2 PG (ref 26.8–34.3)
MCHC RBC AUTO-ENTMCNC: 33.5 G/DL (ref 31.4–37.4)
MCV RBC AUTO: 90 FL (ref 82–98)
MONOCYTES # BLD AUTO: 0.64 THOUSAND/ΜL (ref 0.17–1.22)
MONOCYTES NFR BLD AUTO: 7 % (ref 4–12)
NEUTROPHILS # BLD AUTO: 6.37 THOUSANDS/ΜL (ref 1.85–7.62)
NEUTS SEG NFR BLD AUTO: 68 % (ref 43–75)
NRBC BLD AUTO-RTO: 0 /100 WBCS
PLATELET # BLD AUTO: 196 THOUSANDS/UL (ref 149–390)
PMV BLD AUTO: 10 FL (ref 8.9–12.7)
POTASSIUM SERPL-SCNC: 3.8 MMOL/L (ref 3.5–5.3)
PROT SERPL-MCNC: 7.5 G/DL (ref 6.4–8.2)
PROTHROMBIN TIME: 13.9 SECONDS (ref 11.6–14.5)
RBC # BLD AUTO: 4.54 MILLION/UL (ref 3.81–5.12)
SODIUM SERPL-SCNC: 142 MMOL/L (ref 136–145)
TROPONIN I SERPL-MCNC: 0.04 NG/ML
WBC # BLD AUTO: 9.52 THOUSAND/UL (ref 4.31–10.16)

## 2020-05-11 PROCEDURE — 99285 EMERGENCY DEPT VISIT HI MDM: CPT

## 2020-05-11 PROCEDURE — 85025 COMPLETE CBC W/AUTO DIFF WBC: CPT | Performed by: EMERGENCY MEDICINE

## 2020-05-11 PROCEDURE — 93005 ELECTROCARDIOGRAM TRACING: CPT

## 2020-05-11 PROCEDURE — 99285 EMERGENCY DEPT VISIT HI MDM: CPT | Performed by: EMERGENCY MEDICINE

## 2020-05-11 PROCEDURE — 84484 ASSAY OF TROPONIN QUANT: CPT | Performed by: EMERGENCY MEDICINE

## 2020-05-11 PROCEDURE — 96374 THER/PROPH/DIAG INJ IV PUSH: CPT

## 2020-05-11 PROCEDURE — 36415 COLL VENOUS BLD VENIPUNCTURE: CPT | Performed by: EMERGENCY MEDICINE

## 2020-05-11 PROCEDURE — 71045 X-RAY EXAM CHEST 1 VIEW: CPT

## 2020-05-11 PROCEDURE — 87635 SARS-COV-2 COVID-19 AMP PRB: CPT | Performed by: EMERGENCY MEDICINE

## 2020-05-11 PROCEDURE — 83605 ASSAY OF LACTIC ACID: CPT | Performed by: EMERGENCY MEDICINE

## 2020-05-11 PROCEDURE — 82948 REAGENT STRIP/BLOOD GLUCOSE: CPT

## 2020-05-11 PROCEDURE — 74177 CT ABD & PELVIS W/CONTRAST: CPT

## 2020-05-11 PROCEDURE — 85730 THROMBOPLASTIN TIME PARTIAL: CPT | Performed by: EMERGENCY MEDICINE

## 2020-05-11 PROCEDURE — 80053 COMPREHEN METABOLIC PANEL: CPT | Performed by: EMERGENCY MEDICINE

## 2020-05-11 PROCEDURE — 85610 PROTHROMBIN TIME: CPT | Performed by: EMERGENCY MEDICINE

## 2020-05-11 PROCEDURE — 70450 CT HEAD/BRAIN W/O DYE: CPT

## 2020-05-11 PROCEDURE — 80177 DRUG SCRN QUAN LEVETIRACETAM: CPT | Performed by: EMERGENCY MEDICINE

## 2020-05-11 PROCEDURE — 96361 HYDRATE IV INFUSION ADD-ON: CPT

## 2020-05-11 RX ADMIN — SODIUM CHLORIDE 1000 ML: 0.9 INJECTION, SOLUTION INTRAVENOUS at 22:22

## 2020-05-11 RX ADMIN — LEVETIRACETAM 750 MG: 100 INJECTION, SOLUTION INTRAVENOUS at 23:31

## 2020-05-11 RX ADMIN — IOHEXOL 100 ML: 350 INJECTION, SOLUTION INTRAVENOUS at 23:37

## 2020-05-12 PROBLEM — R41.0 CONFUSION: Status: ACTIVE | Noted: 2020-05-12

## 2020-05-12 PROBLEM — N30.01 ACUTE CYSTITIS WITH HEMATURIA: Status: ACTIVE | Noted: 2020-05-12

## 2020-05-12 LAB
ALBUMIN SERPL BCP-MCNC: 3.1 G/DL (ref 3.5–5)
ALP SERPL-CCNC: 118 U/L (ref 46–116)
ALT SERPL W P-5'-P-CCNC: 10 U/L (ref 12–78)
ANION GAP SERPL CALCULATED.3IONS-SCNC: 9 MMOL/L (ref 4–13)
AST SERPL W P-5'-P-CCNC: 16 U/L (ref 5–45)
BACTERIA UR QL AUTO: ABNORMAL /HPF
BASOPHILS # BLD AUTO: 0.01 THOUSANDS/ΜL (ref 0–0.1)
BASOPHILS NFR BLD AUTO: 0 % (ref 0–1)
BILIRUB SERPL-MCNC: 1.4 MG/DL (ref 0.2–1)
BILIRUB UR QL STRIP: NEGATIVE
BUN SERPL-MCNC: 12 MG/DL (ref 5–25)
CALCIUM SERPL-MCNC: 7.8 MG/DL (ref 8.3–10.1)
CHLORIDE SERPL-SCNC: 106 MMOL/L (ref 100–108)
CLARITY UR: ABNORMAL
CO2 SERPL-SCNC: 26 MMOL/L (ref 21–32)
COLOR UR: YELLOW
CREAT SERPL-MCNC: 0.61 MG/DL (ref 0.6–1.3)
EOSINOPHIL # BLD AUTO: 0.07 THOUSAND/ΜL (ref 0–0.61)
EOSINOPHIL NFR BLD AUTO: 1 % (ref 0–6)
ERYTHROCYTE [DISTWIDTH] IN BLOOD BY AUTOMATED COUNT: 14.6 % (ref 11.6–15.1)
GFR SERPL CREATININE-BSD FRML MDRD: 84 ML/MIN/1.73SQ M
GLUCOSE SERPL-MCNC: 100 MG/DL (ref 65–140)
GLUCOSE UR STRIP-MCNC: NEGATIVE MG/DL
HCT VFR BLD AUTO: 38.2 % (ref 34.8–46.1)
HGB BLD-MCNC: 12.7 G/DL (ref 11.5–15.4)
HGB UR QL STRIP.AUTO: ABNORMAL
IMM GRANULOCYTES # BLD AUTO: 0.02 THOUSAND/UL (ref 0–0.2)
IMM GRANULOCYTES NFR BLD AUTO: 0 % (ref 0–2)
KETONES UR STRIP-MCNC: ABNORMAL MG/DL
LEUKOCYTE ESTERASE UR QL STRIP: ABNORMAL
LYMPHOCYTES # BLD AUTO: 1.4 THOUSANDS/ΜL (ref 0.6–4.47)
LYMPHOCYTES NFR BLD AUTO: 19 % (ref 14–44)
MAGNESIUM SERPL-MCNC: 1.9 MG/DL (ref 1.6–2.6)
MCH RBC QN AUTO: 30.2 PG (ref 26.8–34.3)
MCHC RBC AUTO-ENTMCNC: 33.2 G/DL (ref 31.4–37.4)
MCV RBC AUTO: 91 FL (ref 82–98)
MONOCYTES # BLD AUTO: 0.4 THOUSAND/ΜL (ref 0.17–1.22)
MONOCYTES NFR BLD AUTO: 5 % (ref 4–12)
MUCOUS THREADS UR QL AUTO: ABNORMAL
NEUTROPHILS # BLD AUTO: 5.54 THOUSANDS/ΜL (ref 1.85–7.62)
NEUTS SEG NFR BLD AUTO: 75 % (ref 43–75)
NITRITE UR QL STRIP: NEGATIVE
NON-SQ EPI CELLS URNS QL MICRO: ABNORMAL /HPF
NRBC BLD AUTO-RTO: 0 /100 WBCS
PH UR STRIP.AUTO: 7 [PH]
PHOSPHATE SERPL-MCNC: 2.9 MG/DL (ref 2.3–4.1)
PLATELET # BLD AUTO: 156 THOUSANDS/UL (ref 149–390)
PMV BLD AUTO: 9.7 FL (ref 8.9–12.7)
POTASSIUM SERPL-SCNC: 3.4 MMOL/L (ref 3.5–5.3)
PROT SERPL-MCNC: 6.6 G/DL (ref 6.4–8.2)
PROT UR STRIP-MCNC: NEGATIVE MG/DL
RBC # BLD AUTO: 4.2 MILLION/UL (ref 3.81–5.12)
RBC #/AREA URNS AUTO: ABNORMAL /HPF
SARS-COV-2 RNA RESP QL NAA+PROBE: NEGATIVE
SODIUM SERPL-SCNC: 141 MMOL/L (ref 136–145)
SP GR UR STRIP.AUTO: 1.01 (ref 1–1.03)
UROBILINOGEN UR QL STRIP.AUTO: 0.2 E.U./DL
WBC # BLD AUTO: 7.44 THOUSAND/UL (ref 4.31–10.16)
WBC #/AREA URNS AUTO: ABNORMAL /HPF

## 2020-05-12 PROCEDURE — 80053 COMPREHEN METABOLIC PANEL: CPT | Performed by: NURSE PRACTITIONER

## 2020-05-12 PROCEDURE — 87077 CULTURE AEROBIC IDENTIFY: CPT | Performed by: EMERGENCY MEDICINE

## 2020-05-12 PROCEDURE — 99223 1ST HOSP IP/OBS HIGH 75: CPT | Performed by: INTERNAL MEDICINE

## 2020-05-12 PROCEDURE — 87086 URINE CULTURE/COLONY COUNT: CPT | Performed by: EMERGENCY MEDICINE

## 2020-05-12 PROCEDURE — 36415 COLL VENOUS BLD VENIPUNCTURE: CPT | Performed by: NURSE PRACTITIONER

## 2020-05-12 PROCEDURE — 84100 ASSAY OF PHOSPHORUS: CPT | Performed by: NURSE PRACTITIONER

## 2020-05-12 PROCEDURE — 85025 COMPLETE CBC W/AUTO DIFF WBC: CPT | Performed by: NURSE PRACTITIONER

## 2020-05-12 PROCEDURE — 87186 SC STD MICRODIL/AGAR DIL: CPT | Performed by: EMERGENCY MEDICINE

## 2020-05-12 PROCEDURE — 81001 URINALYSIS AUTO W/SCOPE: CPT | Performed by: EMERGENCY MEDICINE

## 2020-05-12 PROCEDURE — 83735 ASSAY OF MAGNESIUM: CPT | Performed by: NURSE PRACTITIONER

## 2020-05-12 RX ORDER — ACETAMINOPHEN 325 MG/1
650 TABLET ORAL EVERY 6 HOURS PRN
Status: DISCONTINUED | OUTPATIENT
Start: 2020-05-12 | End: 2020-05-15 | Stop reason: HOSPADM

## 2020-05-12 RX ORDER — POTASSIUM CHLORIDE 14.9 MG/ML
20 INJECTION INTRAVENOUS ONCE
Status: COMPLETED | OUTPATIENT
Start: 2020-05-12 | End: 2020-05-12

## 2020-05-12 RX ORDER — MINERAL OIL AND PETROLATUM 150; 830 MG/G; MG/G
OINTMENT OPHTHALMIC
Status: DISCONTINUED | OUTPATIENT
Start: 2020-05-12 | End: 2020-05-15 | Stop reason: HOSPADM

## 2020-05-12 RX ORDER — CALCIUM CARBONATE 500(1250)
2 TABLET ORAL
COMMUNITY
End: 2020-06-07 | Stop reason: SDUPTHER

## 2020-05-12 RX ORDER — METOPROLOL TARTRATE 50 MG/1
50 TABLET, FILM COATED ORAL EVERY 12 HOURS SCHEDULED
Status: DISCONTINUED | OUTPATIENT
Start: 2020-05-12 | End: 2020-05-15 | Stop reason: HOSPADM

## 2020-05-12 RX ORDER — MELATONIN
1000 DAILY
Status: DISCONTINUED | OUTPATIENT
Start: 2020-05-12 | End: 2020-05-15 | Stop reason: HOSPADM

## 2020-05-12 RX ORDER — SODIUM CHLORIDE 9 MG/ML
125 INJECTION, SOLUTION INTRAVENOUS CONTINUOUS
Status: DISCONTINUED | OUTPATIENT
Start: 2020-05-12 | End: 2020-05-14

## 2020-05-12 RX ORDER — METHIMAZOLE 5 MG/1
10 TABLET ORAL EVERY 8 HOURS SCHEDULED
Status: DISCONTINUED | OUTPATIENT
Start: 2020-05-12 | End: 2020-05-15 | Stop reason: HOSPADM

## 2020-05-12 RX ORDER — B-COMPLEX WITH VITAMIN C
1 TABLET ORAL DAILY
Status: DISCONTINUED | OUTPATIENT
Start: 2020-05-12 | End: 2020-05-15 | Stop reason: HOSPADM

## 2020-05-12 RX ORDER — BIOTIN 1 MG
1 TABLET ORAL EVERY MORNING
COMMUNITY

## 2020-05-12 RX ORDER — CARBOXYMETHYLCELLULOSE SODIUM 5 MG/ML
1 SOLUTION/ DROPS OPHTHALMIC 4 TIMES DAILY
COMMUNITY

## 2020-05-12 RX ORDER — MULTIVITAMIN/IRON/FOLIC ACID 18MG-0.4MG
1 TABLET ORAL DAILY
Status: DISCONTINUED | OUTPATIENT
Start: 2020-05-12 | End: 2020-05-15 | Stop reason: HOSPADM

## 2020-05-12 RX ADMIN — LEVETIRACETAM 750 MG: 250 TABLET, FILM COATED ORAL at 21:22

## 2020-05-12 RX ADMIN — SODIUM CHLORIDE 125 ML/HR: 0.9 INJECTION, SOLUTION INTRAVENOUS at 04:35

## 2020-05-12 RX ADMIN — SODIUM CHLORIDE 125 ML/HR: 0.9 INJECTION, SOLUTION INTRAVENOUS at 13:06

## 2020-05-12 RX ADMIN — Medication 60 MG: at 04:36

## 2020-05-12 RX ADMIN — POTASSIUM CHLORIDE 20 MEQ: 14.9 INJECTION, SOLUTION INTRAVENOUS at 17:32

## 2020-05-12 RX ADMIN — RIVAROXABAN 20 MG: 10 TABLET, FILM COATED ORAL at 07:37

## 2020-05-12 RX ADMIN — ERTAPENEM SODIUM 1000 MG: 1 INJECTION, POWDER, LYOPHILIZED, FOR SOLUTION INTRAMUSCULAR; INTRAVENOUS at 13:06

## 2020-05-12 RX ADMIN — SODIUM CHLORIDE 125 ML/HR: 0.9 INJECTION, SOLUTION INTRAVENOUS at 21:31

## 2020-05-12 RX ADMIN — METHIMAZOLE 10 MG: 5 TABLET ORAL at 17:17

## 2020-05-12 RX ADMIN — OYSTER SHELL CALCIUM WITH VITAMIN D 1 TABLET: 500; 200 TABLET, FILM COATED ORAL at 08:15

## 2020-05-12 RX ADMIN — Medication 1 TABLET: at 08:18

## 2020-05-12 RX ADMIN — METOPROLOL TARTRATE 50 MG: 50 TABLET, FILM COATED ORAL at 21:28

## 2020-05-12 RX ADMIN — METOPROLOL TARTRATE 50 MG: 50 TABLET, FILM COATED ORAL at 08:17

## 2020-05-12 RX ADMIN — MELATONIN 1000 UNITS: at 08:16

## 2020-05-12 RX ADMIN — LEVETIRACETAM 750 MG: 250 TABLET, FILM COATED ORAL at 08:16

## 2020-05-12 RX ADMIN — METHIMAZOLE 10 MG: 5 TABLET ORAL at 07:37

## 2020-05-13 ENCOUNTER — APPOINTMENT (INPATIENT)
Dept: NEUROLOGY | Facility: HOSPITAL | Age: 85
DRG: 100 | End: 2020-05-13
Attending: INTERNAL MEDICINE
Payer: MEDICARE

## 2020-05-13 PROBLEM — G93.41 ACUTE METABOLIC ENCEPHALOPATHY: Status: ACTIVE | Noted: 2020-05-12

## 2020-05-13 LAB
ALBUMIN SERPL BCP-MCNC: 2.8 G/DL (ref 3.5–5)
ALP SERPL-CCNC: 110 U/L (ref 46–116)
ALT SERPL W P-5'-P-CCNC: 11 U/L (ref 12–78)
ANION GAP SERPL CALCULATED.3IONS-SCNC: 13 MMOL/L (ref 4–13)
AST SERPL W P-5'-P-CCNC: 18 U/L (ref 5–45)
ATRIAL RATE: 69 BPM
BASOPHILS # BLD AUTO: 0.02 THOUSANDS/ΜL (ref 0–0.1)
BASOPHILS NFR BLD AUTO: 0 % (ref 0–1)
BILIRUB SERPL-MCNC: 1.2 MG/DL (ref 0.2–1)
BUN SERPL-MCNC: 9 MG/DL (ref 5–25)
CALCIUM SERPL-MCNC: 7.8 MG/DL (ref 8.3–10.1)
CHLORIDE SERPL-SCNC: 104 MMOL/L (ref 100–108)
CO2 SERPL-SCNC: 21 MMOL/L (ref 21–32)
CREAT SERPL-MCNC: 0.65 MG/DL (ref 0.6–1.3)
EOSINOPHIL # BLD AUTO: 0.12 THOUSAND/ΜL (ref 0–0.61)
EOSINOPHIL NFR BLD AUTO: 2 % (ref 0–6)
ERYTHROCYTE [DISTWIDTH] IN BLOOD BY AUTOMATED COUNT: 14.2 % (ref 11.6–15.1)
GFR SERPL CREATININE-BSD FRML MDRD: 82 ML/MIN/1.73SQ M
GLUCOSE SERPL-MCNC: 51 MG/DL (ref 65–140)
HCT VFR BLD AUTO: 37.6 % (ref 34.8–46.1)
HGB BLD-MCNC: 12.6 G/DL (ref 11.5–15.4)
IMM GRANULOCYTES # BLD AUTO: 0.01 THOUSAND/UL (ref 0–0.2)
IMM GRANULOCYTES NFR BLD AUTO: 0 % (ref 0–2)
LYMPHOCYTES # BLD AUTO: 1.57 THOUSANDS/ΜL (ref 0.6–4.47)
LYMPHOCYTES NFR BLD AUTO: 25 % (ref 14–44)
MAGNESIUM SERPL-MCNC: 1.5 MG/DL (ref 1.6–2.6)
MCH RBC QN AUTO: 30.4 PG (ref 26.8–34.3)
MCHC RBC AUTO-ENTMCNC: 33.5 G/DL (ref 31.4–37.4)
MCV RBC AUTO: 91 FL (ref 82–98)
MONOCYTES # BLD AUTO: 0.35 THOUSAND/ΜL (ref 0.17–1.22)
MONOCYTES NFR BLD AUTO: 6 % (ref 4–12)
NEUTROPHILS # BLD AUTO: 4.16 THOUSANDS/ΜL (ref 1.85–7.62)
NEUTS SEG NFR BLD AUTO: 67 % (ref 43–75)
NRBC BLD AUTO-RTO: 0 /100 WBCS
P AXIS: 57 DEGREES
PLATELET # BLD AUTO: 156 THOUSANDS/UL (ref 149–390)
PMV BLD AUTO: 9.4 FL (ref 8.9–12.7)
POTASSIUM SERPL-SCNC: 3.8 MMOL/L (ref 3.5–5.3)
PR INTERVAL: 162 MS
PROT SERPL-MCNC: 6.2 G/DL (ref 6.4–8.2)
QRS AXIS: -6 DEGREES
QRSD INTERVAL: 76 MS
QT INTERVAL: 468 MS
QTC INTERVAL: 501 MS
RBC # BLD AUTO: 4.15 MILLION/UL (ref 3.81–5.12)
SODIUM SERPL-SCNC: 138 MMOL/L (ref 136–145)
T WAVE AXIS: 50 DEGREES
VENTRICULAR RATE: 69 BPM
WBC # BLD AUTO: 6.23 THOUSAND/UL (ref 4.31–10.16)

## 2020-05-13 PROCEDURE — 95816 EEG AWAKE AND DROWSY: CPT | Performed by: PSYCHIATRY & NEUROLOGY

## 2020-05-13 PROCEDURE — 93010 ELECTROCARDIOGRAM REPORT: CPT | Performed by: INTERNAL MEDICINE

## 2020-05-13 PROCEDURE — 83735 ASSAY OF MAGNESIUM: CPT | Performed by: INTERNAL MEDICINE

## 2020-05-13 PROCEDURE — 99232 SBSQ HOSP IP/OBS MODERATE 35: CPT | Performed by: INTERNAL MEDICINE

## 2020-05-13 PROCEDURE — 92610 EVALUATE SWALLOWING FUNCTION: CPT

## 2020-05-13 PROCEDURE — 85025 COMPLETE CBC W/AUTO DIFF WBC: CPT | Performed by: INTERNAL MEDICINE

## 2020-05-13 PROCEDURE — 99221 1ST HOSP IP/OBS SF/LOW 40: CPT | Performed by: PSYCHIATRY & NEUROLOGY

## 2020-05-13 PROCEDURE — 80053 COMPREHEN METABOLIC PANEL: CPT | Performed by: INTERNAL MEDICINE

## 2020-05-13 PROCEDURE — 95816 EEG AWAKE AND DROWSY: CPT

## 2020-05-13 RX ORDER — LEVETIRACETAM 500 MG/1
1000 TABLET ORAL EVERY 12 HOURS SCHEDULED
Status: DISCONTINUED | OUTPATIENT
Start: 2020-05-13 | End: 2020-05-15 | Stop reason: HOSPADM

## 2020-05-13 RX ORDER — MAGNESIUM SULFATE HEPTAHYDRATE 40 MG/ML
2 INJECTION, SOLUTION INTRAVENOUS ONCE
Status: COMPLETED | OUTPATIENT
Start: 2020-05-13 | End: 2020-05-13

## 2020-05-13 RX ADMIN — LEVETIRACETAM 750 MG: 250 TABLET, FILM COATED ORAL at 09:30

## 2020-05-13 RX ADMIN — SODIUM CHLORIDE 125 ML/HR: 0.9 INJECTION, SOLUTION INTRAVENOUS at 05:14

## 2020-05-13 RX ADMIN — METHIMAZOLE 10 MG: 5 TABLET ORAL at 21:22

## 2020-05-13 RX ADMIN — Medication 1 TABLET: at 09:30

## 2020-05-13 RX ADMIN — LEVETIRACETAM 1000 MG: 500 TABLET, FILM COATED ORAL at 21:22

## 2020-05-13 RX ADMIN — METHIMAZOLE 10 MG: 5 TABLET ORAL at 13:19

## 2020-05-13 RX ADMIN — METHIMAZOLE 10 MG: 5 TABLET ORAL at 06:40

## 2020-05-13 RX ADMIN — METOPROLOL TARTRATE 50 MG: 50 TABLET, FILM COATED ORAL at 21:22

## 2020-05-13 RX ADMIN — MELATONIN 1000 UNITS: at 09:30

## 2020-05-13 RX ADMIN — METHIMAZOLE 10 MG: 5 TABLET ORAL at 00:19

## 2020-05-13 RX ADMIN — OYSTER SHELL CALCIUM WITH VITAMIN D 1 TABLET: 500; 200 TABLET, FILM COATED ORAL at 09:30

## 2020-05-13 RX ADMIN — ERTAPENEM SODIUM 1000 MG: 1 INJECTION, POWDER, LYOPHILIZED, FOR SOLUTION INTRAMUSCULAR; INTRAVENOUS at 13:19

## 2020-05-13 RX ADMIN — WHITE PETROLATUM 57.7 %-MINERAL OIL 31.9 % EYE OINTMENT 1 APPLICATION: at 21:24

## 2020-05-13 RX ADMIN — RIVAROXABAN 20 MG: 10 TABLET, FILM COATED ORAL at 09:30

## 2020-05-13 RX ADMIN — METOPROLOL TARTRATE 50 MG: 50 TABLET, FILM COATED ORAL at 09:30

## 2020-05-13 RX ADMIN — SODIUM CHLORIDE 125 ML/HR: 0.9 INJECTION, SOLUTION INTRAVENOUS at 16:29

## 2020-05-13 RX ADMIN — MAGNESIUM SULFATE HEPTAHYDRATE 2 G: 40 INJECTION, SOLUTION INTRAVENOUS at 11:30

## 2020-05-14 LAB
ANION GAP SERPL CALCULATED.3IONS-SCNC: 7 MMOL/L (ref 4–13)
BASOPHILS # BLD AUTO: 0.02 THOUSANDS/ΜL (ref 0–0.1)
BASOPHILS NFR BLD AUTO: 0 % (ref 0–1)
BUN SERPL-MCNC: 15 MG/DL (ref 5–25)
CALCIUM SERPL-MCNC: 7.8 MG/DL (ref 8.3–10.1)
CHLORIDE SERPL-SCNC: 109 MMOL/L (ref 100–108)
CO2 SERPL-SCNC: 25 MMOL/L (ref 21–32)
CREAT SERPL-MCNC: 0.62 MG/DL (ref 0.6–1.3)
EOSINOPHIL # BLD AUTO: 0.08 THOUSAND/ΜL (ref 0–0.61)
EOSINOPHIL NFR BLD AUTO: 2 % (ref 0–6)
ERYTHROCYTE [DISTWIDTH] IN BLOOD BY AUTOMATED COUNT: 14.6 % (ref 11.6–15.1)
GFR SERPL CREATININE-BSD FRML MDRD: 83 ML/MIN/1.73SQ M
GLUCOSE SERPL-MCNC: 109 MG/DL (ref 65–140)
GLUCOSE SERPL-MCNC: 116 MG/DL (ref 65–140)
HCT VFR BLD AUTO: 37.3 % (ref 34.8–46.1)
HGB BLD-MCNC: 12.5 G/DL (ref 11.5–15.4)
IMM GRANULOCYTES # BLD AUTO: 0.02 THOUSAND/UL (ref 0–0.2)
IMM GRANULOCYTES NFR BLD AUTO: 0 % (ref 0–2)
LEVETIRACETAM SERPL-MCNC: 14.4 UG/ML (ref 10–40)
LYMPHOCYTES # BLD AUTO: 1.58 THOUSANDS/ΜL (ref 0.6–4.47)
LYMPHOCYTES NFR BLD AUTO: 30 % (ref 14–44)
MAGNESIUM SERPL-MCNC: 1.9 MG/DL (ref 1.6–2.6)
MCH RBC QN AUTO: 30.3 PG (ref 26.8–34.3)
MCHC RBC AUTO-ENTMCNC: 33.5 G/DL (ref 31.4–37.4)
MCV RBC AUTO: 91 FL (ref 82–98)
MONOCYTES # BLD AUTO: 0.33 THOUSAND/ΜL (ref 0.17–1.22)
MONOCYTES NFR BLD AUTO: 6 % (ref 4–12)
NEUTROPHILS # BLD AUTO: 3.31 THOUSANDS/ΜL (ref 1.85–7.62)
NEUTS SEG NFR BLD AUTO: 62 % (ref 43–75)
NRBC BLD AUTO-RTO: 0 /100 WBCS
PLATELET # BLD AUTO: 156 THOUSANDS/UL (ref 149–390)
PMV BLD AUTO: 9.5 FL (ref 8.9–12.7)
POTASSIUM SERPL-SCNC: 3.5 MMOL/L (ref 3.5–5.3)
RBC # BLD AUTO: 4.12 MILLION/UL (ref 3.81–5.12)
SODIUM SERPL-SCNC: 141 MMOL/L (ref 136–145)
WBC # BLD AUTO: 5.34 THOUSAND/UL (ref 4.31–10.16)

## 2020-05-14 PROCEDURE — 99232 SBSQ HOSP IP/OBS MODERATE 35: CPT | Performed by: INTERNAL MEDICINE

## 2020-05-14 PROCEDURE — 83735 ASSAY OF MAGNESIUM: CPT | Performed by: INTERNAL MEDICINE

## 2020-05-14 PROCEDURE — 87081 CULTURE SCREEN ONLY: CPT | Performed by: HOSPITALIST

## 2020-05-14 PROCEDURE — 85025 COMPLETE CBC W/AUTO DIFF WBC: CPT | Performed by: INTERNAL MEDICINE

## 2020-05-14 PROCEDURE — 82948 REAGENT STRIP/BLOOD GLUCOSE: CPT

## 2020-05-14 PROCEDURE — 80048 BASIC METABOLIC PNL TOTAL CA: CPT | Performed by: INTERNAL MEDICINE

## 2020-05-14 RX ORDER — POTASSIUM CHLORIDE 20 MEQ/1
40 TABLET, EXTENDED RELEASE ORAL ONCE
Status: COMPLETED | OUTPATIENT
Start: 2020-05-14 | End: 2020-05-14

## 2020-05-14 RX ORDER — SULFAMETHOXAZOLE AND TRIMETHOPRIM 800; 160 MG/1; MG/1
1 TABLET ORAL EVERY 12 HOURS SCHEDULED
Status: DISCONTINUED | OUTPATIENT
Start: 2020-05-14 | End: 2020-05-15 | Stop reason: HOSPADM

## 2020-05-14 RX ADMIN — Medication 1 TABLET: at 08:46

## 2020-05-14 RX ADMIN — LEVETIRACETAM 1000 MG: 500 TABLET, FILM COATED ORAL at 22:23

## 2020-05-14 RX ADMIN — MELATONIN 1000 UNITS: at 08:46

## 2020-05-14 RX ADMIN — WHITE PETROLATUM 57.7 %-MINERAL OIL 31.9 % EYE OINTMENT: at 22:28

## 2020-05-14 RX ADMIN — METOPROLOL TARTRATE 50 MG: 50 TABLET, FILM COATED ORAL at 22:22

## 2020-05-14 RX ADMIN — METHIMAZOLE 10 MG: 5 TABLET ORAL at 22:23

## 2020-05-14 RX ADMIN — ERTAPENEM SODIUM 1000 MG: 1 INJECTION, POWDER, LYOPHILIZED, FOR SOLUTION INTRAMUSCULAR; INTRAVENOUS at 13:08

## 2020-05-14 RX ADMIN — LEVETIRACETAM 1000 MG: 500 TABLET, FILM COATED ORAL at 08:45

## 2020-05-14 RX ADMIN — POTASSIUM CHLORIDE 40 MEQ: 1500 TABLET, EXTENDED RELEASE ORAL at 08:45

## 2020-05-14 RX ADMIN — METOPROLOL TARTRATE 50 MG: 50 TABLET, FILM COATED ORAL at 08:45

## 2020-05-14 RX ADMIN — OYSTER SHELL CALCIUM WITH VITAMIN D 1 TABLET: 500; 200 TABLET, FILM COATED ORAL at 08:45

## 2020-05-14 RX ADMIN — SULFAMETHOXAZOLE AND TRIMETHOPRIM 1 TABLET: 800; 160 TABLET ORAL at 22:23

## 2020-05-14 RX ADMIN — SULFAMETHOXAZOLE AND TRIMETHOPRIM 1 TABLET: 800; 160 TABLET ORAL at 15:20

## 2020-05-14 RX ADMIN — METHIMAZOLE 10 MG: 5 TABLET ORAL at 13:08

## 2020-05-14 RX ADMIN — METHIMAZOLE 10 MG: 5 TABLET ORAL at 06:31

## 2020-05-14 RX ADMIN — RIVAROXABAN 20 MG: 10 TABLET, FILM COATED ORAL at 08:55

## 2020-05-15 ENCOUNTER — TELEPHONE (OUTPATIENT)
Dept: OTHER | Facility: OTHER | Age: 85
End: 2020-05-15

## 2020-05-15 VITALS
OXYGEN SATURATION: 95 % | TEMPERATURE: 97.3 F | BODY MASS INDEX: 24.1 KG/M2 | RESPIRATION RATE: 18 BRPM | WEIGHT: 153.88 LBS | DIASTOLIC BLOOD PRESSURE: 60 MMHG | SYSTOLIC BLOOD PRESSURE: 105 MMHG | HEART RATE: 63 BPM

## 2020-05-15 LAB
ANION GAP SERPL CALCULATED.3IONS-SCNC: 7 MMOL/L (ref 4–13)
BUN SERPL-MCNC: 15 MG/DL (ref 5–25)
CALCIUM SERPL-MCNC: 8.2 MG/DL (ref 8.3–10.1)
CHLORIDE SERPL-SCNC: 106 MMOL/L (ref 100–108)
CO2 SERPL-SCNC: 26 MMOL/L (ref 21–32)
CREAT SERPL-MCNC: 0.7 MG/DL (ref 0.6–1.3)
GFR SERPL CREATININE-BSD FRML MDRD: 80 ML/MIN/1.73SQ M
GLUCOSE SERPL-MCNC: 109 MG/DL (ref 65–140)
MRSA NOSE QL CULT: NORMAL
POTASSIUM SERPL-SCNC: 4.1 MMOL/L (ref 3.5–5.3)
SARS-COV-2 RNA RESP QL NAA+PROBE: NEGATIVE
SODIUM SERPL-SCNC: 139 MMOL/L (ref 136–145)

## 2020-05-15 PROCEDURE — 87635 SARS-COV-2 COVID-19 AMP PRB: CPT | Performed by: INTERNAL MEDICINE

## 2020-05-15 PROCEDURE — 99239 HOSP IP/OBS DSCHRG MGMT >30: CPT | Performed by: INTERNAL MEDICINE

## 2020-05-15 PROCEDURE — 97163 PT EVAL HIGH COMPLEX 45 MIN: CPT

## 2020-05-15 PROCEDURE — 97167 OT EVAL HIGH COMPLEX 60 MIN: CPT

## 2020-05-15 PROCEDURE — 80048 BASIC METABOLIC PNL TOTAL CA: CPT | Performed by: INTERNAL MEDICINE

## 2020-05-15 RX ORDER — MELATONIN
1000 DAILY
Refills: 0
Start: 2020-05-16 | End: 2020-06-07 | Stop reason: SDUPTHER

## 2020-05-15 RX ORDER — LEVETIRACETAM 1000 MG/1
1000 TABLET ORAL EVERY 12 HOURS SCHEDULED
Refills: 0
Start: 2020-05-15 | End: 2020-06-16

## 2020-05-15 RX ORDER — SULFAMETHOXAZOLE AND TRIMETHOPRIM 800; 160 MG/1; MG/1
1 TABLET ORAL EVERY 12 HOURS SCHEDULED
Qty: 13 TABLET | Refills: 0
Start: 2020-05-15 | End: 2020-05-22

## 2020-05-15 RX ORDER — MINERAL OIL AND PETROLATUM 150; 830 MG/G; MG/G
OINTMENT OPHTHALMIC
Refills: 0
Start: 2020-05-15

## 2020-05-15 RX ADMIN — RIVAROXABAN 20 MG: 10 TABLET, FILM COATED ORAL at 09:10

## 2020-05-15 RX ADMIN — METOPROLOL TARTRATE 50 MG: 50 TABLET, FILM COATED ORAL at 09:06

## 2020-05-15 RX ADMIN — OYSTER SHELL CALCIUM WITH VITAMIN D 1 TABLET: 500; 200 TABLET, FILM COATED ORAL at 09:10

## 2020-05-15 RX ADMIN — Medication 1 TABLET: at 09:06

## 2020-05-15 RX ADMIN — MELATONIN 1000 UNITS: at 09:06

## 2020-05-15 RX ADMIN — LEVETIRACETAM 1000 MG: 500 TABLET, FILM COATED ORAL at 09:06

## 2020-05-15 RX ADMIN — METHIMAZOLE 10 MG: 5 TABLET ORAL at 05:09

## 2020-05-15 RX ADMIN — SULFAMETHOXAZOLE AND TRIMETHOPRIM 1 TABLET: 800; 160 TABLET ORAL at 09:06

## 2020-05-15 RX ADMIN — METHIMAZOLE 10 MG: 5 TABLET ORAL at 13:04

## 2020-05-16 LAB
BACTERIA UR CULT: ABNORMAL

## 2020-06-06 DIAGNOSIS — F01.50 VASCULAR DEMENTIA WITHOUT BEHAVIORAL DISTURBANCE (HCC): ICD-10-CM

## 2020-06-06 DIAGNOSIS — E55.9 VITAMIN D DEFICIENCY: Primary | ICD-10-CM

## 2020-06-07 RX ORDER — CALCIUM CARBONATE 500(1250)
TABLET ORAL
Qty: 24 TABLET | Refills: 4 | Status: SHIPPED | OUTPATIENT
Start: 2020-06-07

## 2020-06-07 RX ORDER — MELATONIN
Qty: 12 TABLET | Refills: 4 | Status: SHIPPED | OUTPATIENT
Start: 2020-06-07

## 2020-06-15 DIAGNOSIS — G40.909 SEIZURE DISORDER (HCC): ICD-10-CM

## 2020-06-16 RX ORDER — LEVETIRACETAM 1000 MG/1
TABLET ORAL
Qty: 56 TABLET | Refills: 4 | Status: SHIPPED | OUTPATIENT
Start: 2020-06-16

## 2020-07-08 LAB — EXT SARS-COV-2: NOT DETECTED

## 2020-07-16 DIAGNOSIS — I48.0 PAROXYSMAL ATRIAL FIBRILLATION (HCC): Chronic | ICD-10-CM

## 2020-07-16 RX ORDER — RIVAROXABAN 20 MG/1
TABLET, FILM COATED ORAL
Qty: 28 TABLET | Refills: 4 | Status: SHIPPED | OUTPATIENT
Start: 2020-07-16

## 2020-08-27 ENCOUNTER — APPOINTMENT (INPATIENT)
Dept: RADIOLOGY | Facility: HOSPITAL | Age: 85
DRG: 299 | End: 2020-08-27
Payer: MEDICARE

## 2020-08-27 ENCOUNTER — APPOINTMENT (EMERGENCY)
Dept: RADIOLOGY | Facility: HOSPITAL | Age: 85
DRG: 299 | End: 2020-08-27
Payer: MEDICARE

## 2020-08-27 ENCOUNTER — APPOINTMENT (EMERGENCY)
Dept: RADIOLOGY | Facility: HOSPITAL | Age: 85
End: 2020-08-27
Payer: MEDICARE

## 2020-08-27 ENCOUNTER — APPOINTMENT (INPATIENT)
Dept: NON INVASIVE DIAGNOSTICS | Facility: HOSPITAL | Age: 85
DRG: 299 | End: 2020-08-27
Payer: MEDICARE

## 2020-08-27 ENCOUNTER — HOSPITAL ENCOUNTER (INPATIENT)
Facility: HOSPITAL | Age: 85
LOS: 4 days | DRG: 299 | End: 2020-08-31
Attending: EMERGENCY MEDICINE | Admitting: INTERNAL MEDICINE
Payer: MEDICARE

## 2020-08-27 ENCOUNTER — HOSPITAL ENCOUNTER (EMERGENCY)
Facility: HOSPITAL | Age: 85
End: 2020-08-27
Attending: EMERGENCY MEDICINE | Admitting: EMERGENCY MEDICINE
Payer: MEDICARE

## 2020-08-27 VITALS
HEART RATE: 48 BPM | WEIGHT: 147 LBS | HEIGHT: 66 IN | OXYGEN SATURATION: 93 % | SYSTOLIC BLOOD PRESSURE: 140 MMHG | DIASTOLIC BLOOD PRESSURE: 63 MMHG | BODY MASS INDEX: 23.63 KG/M2 | TEMPERATURE: 97.6 F | RESPIRATION RATE: 13 BRPM

## 2020-08-27 DIAGNOSIS — Z51.5 HOSPICE CARE: ICD-10-CM

## 2020-08-27 DIAGNOSIS — I99.8 ISCHEMIC LEG: ICD-10-CM

## 2020-08-27 DIAGNOSIS — R20.9 BILATERAL COLD FEET: Primary | ICD-10-CM

## 2020-08-27 LAB
ANION GAP SERPL CALCULATED.3IONS-SCNC: 6 MMOL/L (ref 4–13)
APTT PPP: 208 SECONDS (ref 23–37)
APTT PPP: 40 SECONDS (ref 23–37)
APTT PPP: 82 SECONDS (ref 23–37)
APTT PPP: >210 SECONDS (ref 23–37)
APTT PPP: >210 SECONDS (ref 23–37)
ATRIAL RATE: 51 BPM
BASOPHILS # BLD AUTO: 0.01 THOUSANDS/ΜL (ref 0–0.1)
BASOPHILS NFR BLD AUTO: 0 % (ref 0–1)
BUN SERPL-MCNC: 17 MG/DL (ref 5–25)
CALCIUM SERPL-MCNC: 9.7 MG/DL (ref 8.3–10.1)
CHLORIDE SERPL-SCNC: 104 MMOL/L (ref 100–108)
CO2 SERPL-SCNC: 30 MMOL/L (ref 21–32)
CREAT SERPL-MCNC: 0.63 MG/DL (ref 0.6–1.3)
EOSINOPHIL # BLD AUTO: 0.14 THOUSAND/ΜL (ref 0–0.61)
EOSINOPHIL NFR BLD AUTO: 2 % (ref 0–6)
ERYTHROCYTE [DISTWIDTH] IN BLOOD BY AUTOMATED COUNT: 14.7 % (ref 11.6–15.1)
FIBRINOGEN PPP-MCNC: 316 MG/DL (ref 227–495)
GFR SERPL CREATININE-BSD FRML MDRD: 82 ML/MIN/1.73SQ M
GLUCOSE SERPL-MCNC: 84 MG/DL (ref 65–140)
HCT VFR BLD AUTO: 41.9 % (ref 34.8–46.1)
HGB BLD-MCNC: 14.2 G/DL (ref 11.5–15.4)
IMM GRANULOCYTES # BLD AUTO: 0.02 THOUSAND/UL (ref 0–0.2)
IMM GRANULOCYTES NFR BLD AUTO: 0 % (ref 0–2)
INR PPP: 1.11 (ref 0.84–1.19)
LACTATE SERPL-SCNC: 1.5 MMOL/L (ref 0.5–2)
LYMPHOCYTES # BLD AUTO: 1.96 THOUSANDS/ΜL (ref 0.6–4.47)
LYMPHOCYTES NFR BLD AUTO: 24 % (ref 14–44)
MCH RBC QN AUTO: 32 PG (ref 26.8–34.3)
MCHC RBC AUTO-ENTMCNC: 33.9 G/DL (ref 31.4–37.4)
MCV RBC AUTO: 94 FL (ref 82–98)
MONOCYTES # BLD AUTO: 0.55 THOUSAND/ΜL (ref 0.17–1.22)
MONOCYTES NFR BLD AUTO: 7 % (ref 4–12)
NEUTROPHILS # BLD AUTO: 5.52 THOUSANDS/ΜL (ref 1.85–7.62)
NEUTS SEG NFR BLD AUTO: 67 % (ref 43–75)
P AXIS: -30 DEGREES
PLATELET # BLD AUTO: 199 THOUSANDS/UL (ref 149–390)
PMV BLD AUTO: 10.1 FL (ref 8.9–12.7)
POTASSIUM SERPL-SCNC: 4.6 MMOL/L (ref 3.5–5.3)
PR INTERVAL: 176 MS
PROTHROMBIN TIME: 14.4 SECONDS (ref 11.6–14.5)
QRS AXIS: -14 DEGREES
QRSD INTERVAL: 78 MS
QT INTERVAL: 468 MS
QTC INTERVAL: 431 MS
RBC # BLD AUTO: 4.44 MILLION/UL (ref 3.81–5.12)
SODIUM SERPL-SCNC: 140 MMOL/L (ref 136–145)
T WAVE AXIS: 36 DEGREES
VENTRICULAR RATE: 51 BPM
WBC # BLD AUTO: 8.2 THOUSAND/UL (ref 4.31–10.16)

## 2020-08-27 PROCEDURE — 99285 EMERGENCY DEPT VISIT HI MDM: CPT

## 2020-08-27 PROCEDURE — 85730 THROMBOPLASTIN TIME PARTIAL: CPT | Performed by: INTERNAL MEDICINE

## 2020-08-27 PROCEDURE — 99285 EMERGENCY DEPT VISIT HI MDM: CPT | Performed by: EMERGENCY MEDICINE

## 2020-08-27 PROCEDURE — 93005 ELECTROCARDIOGRAM TRACING: CPT

## 2020-08-27 PROCEDURE — 36415 COLL VENOUS BLD VENIPUNCTURE: CPT | Performed by: EMERGENCY MEDICINE

## 2020-08-27 PROCEDURE — G1004 CDSM NDSC: HCPCS

## 2020-08-27 PROCEDURE — 70450 CT HEAD/BRAIN W/O DYE: CPT

## 2020-08-27 PROCEDURE — 99223 1ST HOSP IP/OBS HIGH 75: CPT | Performed by: INTERNAL MEDICINE

## 2020-08-27 PROCEDURE — 75635 CT ANGIO ABDOMINAL ARTERIES: CPT

## 2020-08-27 PROCEDURE — 93010 ELECTROCARDIOGRAM REPORT: CPT | Performed by: INTERNAL MEDICINE

## 2020-08-27 PROCEDURE — NC001 PR NO CHARGE: Performed by: SURGERY

## 2020-08-27 PROCEDURE — 93923 UPR/LXTR ART STDY 3+ LVLS: CPT

## 2020-08-27 PROCEDURE — 96375 TX/PRO/DX INJ NEW DRUG ADDON: CPT

## 2020-08-27 PROCEDURE — 93925 LOWER EXTREMITY STUDY: CPT

## 2020-08-27 PROCEDURE — 85730 THROMBOPLASTIN TIME PARTIAL: CPT | Performed by: PHYSICIAN ASSISTANT

## 2020-08-27 PROCEDURE — 85610 PROTHROMBIN TIME: CPT | Performed by: EMERGENCY MEDICINE

## 2020-08-27 PROCEDURE — 99285 EMERGENCY DEPT VISIT HI MDM: CPT | Performed by: SURGERY

## 2020-08-27 PROCEDURE — 85025 COMPLETE CBC W/AUTO DIFF WBC: CPT | Performed by: EMERGENCY MEDICINE

## 2020-08-27 PROCEDURE — 73620 X-RAY EXAM OF FOOT: CPT

## 2020-08-27 PROCEDURE — 80048 BASIC METABOLIC PNL TOTAL CA: CPT | Performed by: EMERGENCY MEDICINE

## 2020-08-27 PROCEDURE — 96365 THER/PROPH/DIAG IV INF INIT: CPT

## 2020-08-27 PROCEDURE — 93925 LOWER EXTREMITY STUDY: CPT | Performed by: SURGERY

## 2020-08-27 PROCEDURE — 85730 THROMBOPLASTIN TIME PARTIAL: CPT

## 2020-08-27 PROCEDURE — 85730 THROMBOPLASTIN TIME PARTIAL: CPT | Performed by: EMERGENCY MEDICINE

## 2020-08-27 PROCEDURE — 93922 UPR/L XTREMITY ART 2 LEVELS: CPT | Performed by: SURGERY

## 2020-08-27 PROCEDURE — 83605 ASSAY OF LACTIC ACID: CPT | Performed by: EMERGENCY MEDICINE

## 2020-08-27 PROCEDURE — 85384 FIBRINOGEN ACTIVITY: CPT | Performed by: STUDENT IN AN ORGANIZED HEALTH CARE EDUCATION/TRAINING PROGRAM

## 2020-08-27 RX ORDER — HEPARIN SODIUM 1000 [USP'U]/ML
2600 INJECTION, SOLUTION INTRAVENOUS; SUBCUTANEOUS
Status: DISCONTINUED | OUTPATIENT
Start: 2020-08-27 | End: 2020-08-27 | Stop reason: HOSPADM

## 2020-08-27 RX ORDER — ACETAMINOPHEN 325 MG/1
650 TABLET ORAL EVERY 8 HOURS SCHEDULED
Status: DISCONTINUED | OUTPATIENT
Start: 2020-08-27 | End: 2020-08-28

## 2020-08-27 RX ORDER — CALCIUM CARBONATE 500(1250)
2 TABLET ORAL
Status: DISCONTINUED | OUTPATIENT
Start: 2020-08-27 | End: 2020-08-28

## 2020-08-27 RX ORDER — HEPARIN SODIUM 10000 [USP'U]/100ML
3-30 INJECTION, SOLUTION INTRAVENOUS
Status: DISCONTINUED | OUTPATIENT
Start: 2020-08-27 | End: 2020-08-28

## 2020-08-27 RX ORDER — MELATONIN
1000 EVERY MORNING
Status: DISCONTINUED | OUTPATIENT
Start: 2020-08-27 | End: 2020-08-28

## 2020-08-27 RX ORDER — METOPROLOL TARTRATE 5 MG/5ML
2.5 INJECTION INTRAVENOUS EVERY 6 HOURS
Status: DISCONTINUED | OUTPATIENT
Start: 2020-08-27 | End: 2020-08-28

## 2020-08-27 RX ORDER — HEPARIN SODIUM 1000 [USP'U]/ML
5200 INJECTION, SOLUTION INTRAVENOUS; SUBCUTANEOUS
Status: DISCONTINUED | OUTPATIENT
Start: 2020-08-27 | End: 2020-08-27 | Stop reason: HOSPADM

## 2020-08-27 RX ORDER — ONDANSETRON 2 MG/ML
4 INJECTION INTRAMUSCULAR; INTRAVENOUS EVERY 6 HOURS PRN
Status: DISCONTINUED | OUTPATIENT
Start: 2020-08-27 | End: 2020-08-31 | Stop reason: HOSPADM

## 2020-08-27 RX ORDER — HEPARIN SODIUM 1000 [USP'U]/ML
2600 INJECTION, SOLUTION INTRAVENOUS; SUBCUTANEOUS
Status: DISCONTINUED | OUTPATIENT
Start: 2020-08-27 | End: 2020-08-28

## 2020-08-27 RX ORDER — ACETAMINOPHEN 325 MG/1
650 TABLET ORAL ONCE
Status: COMPLETED | OUTPATIENT
Start: 2020-08-27 | End: 2020-08-27

## 2020-08-27 RX ORDER — HEPARIN SODIUM 1000 [USP'U]/ML
5200 INJECTION, SOLUTION INTRAVENOUS; SUBCUTANEOUS ONCE
Status: COMPLETED | OUTPATIENT
Start: 2020-08-27 | End: 2020-08-27

## 2020-08-27 RX ORDER — METHIMAZOLE 10 MG/1
10 TABLET ORAL EVERY 8 HOURS SCHEDULED
Status: DISCONTINUED | OUTPATIENT
Start: 2020-08-27 | End: 2020-08-28

## 2020-08-27 RX ORDER — HEPARIN SODIUM 10000 [USP'U]/100ML
3-30 INJECTION, SOLUTION INTRAVENOUS
Status: DISCONTINUED | OUTPATIENT
Start: 2020-08-27 | End: 2020-08-27 | Stop reason: HOSPADM

## 2020-08-27 RX ORDER — LACTULOSE 20 G/30ML
10 SOLUTION ORAL DAILY
Status: DISCONTINUED | OUTPATIENT
Start: 2020-08-27 | End: 2020-08-28

## 2020-08-27 RX ORDER — ACETAMINOPHEN 325 MG/1
650 TABLET ORAL EVERY 6 HOURS PRN
Status: DISCONTINUED | OUTPATIENT
Start: 2020-08-27 | End: 2020-08-27

## 2020-08-27 RX ORDER — LEVETIRACETAM 500 MG/1
1000 TABLET ORAL 2 TIMES DAILY
Status: DISCONTINUED | OUTPATIENT
Start: 2020-08-27 | End: 2020-08-27

## 2020-08-27 RX ORDER — HEPARIN SODIUM 1000 [USP'U]/ML
5200 INJECTION, SOLUTION INTRAVENOUS; SUBCUTANEOUS
Status: DISCONTINUED | OUTPATIENT
Start: 2020-08-27 | End: 2020-08-28

## 2020-08-27 RX ORDER — MINERAL OIL AND PETROLATUM 150; 830 MG/G; MG/G
OINTMENT OPHTHALMIC
Status: DISCONTINUED | OUTPATIENT
Start: 2020-08-27 | End: 2020-08-31 | Stop reason: HOSPADM

## 2020-08-27 RX ORDER — GABAPENTIN 100 MG/1
100 CAPSULE ORAL
Status: DISCONTINUED | OUTPATIENT
Start: 2020-08-27 | End: 2020-08-28

## 2020-08-27 RX ADMIN — HEPARIN SODIUM 18 UNITS/KG/HR: 10000 INJECTION, SOLUTION INTRAVENOUS at 02:23

## 2020-08-27 RX ADMIN — ACETAMINOPHEN 650 MG: 325 TABLET ORAL at 20:48

## 2020-08-27 RX ADMIN — HEPARIN SODIUM 18 UNITS/KG/HR: 10000 INJECTION, SOLUTION INTRAVENOUS at 15:06

## 2020-08-27 RX ADMIN — ACETAMINOPHEN 650 MG: 325 TABLET, FILM COATED ORAL at 01:13

## 2020-08-27 RX ADMIN — WHITE PETROLATUM 57.7 %-MINERAL OIL 31.9 % EYE OINTMENT: at 21:48

## 2020-08-27 RX ADMIN — HEPARIN SODIUM 5200 UNITS: 1000 INJECTION INTRAVENOUS; SUBCUTANEOUS at 02:24

## 2020-08-27 RX ADMIN — METOPROLOL TARTRATE 2.5 MG: 5 INJECTION, SOLUTION INTRAVENOUS at 17:13

## 2020-08-27 RX ADMIN — METOPROLOL TARTRATE 2.5 MG: 5 INJECTION, SOLUTION INTRAVENOUS at 21:49

## 2020-08-27 RX ADMIN — IOHEXOL 110 ML: 350 INJECTION, SOLUTION INTRAVENOUS at 06:43

## 2020-08-27 RX ADMIN — LEVETIRACETAM 1000 MG: 100 INJECTION, SOLUTION INTRAVENOUS at 21:48

## 2020-08-27 NOTE — EMTALA/ACUTE CARE TRANSFER
[facility] EMERGENCY DEPARTMENT  [address]  [address] [facility] [address]  Dept: [phone]      FATWXN TRANSFER CONSENT    NAME Gucci King                                         1935                              MRN [id number]    I have been informed of my rights regarding examination, treatment, and transfer   by Dr Lindsay Sinclair MD    Benefits: Specialized equipment and/or services available at the receiving facility (Include comment)________________________(Vascular surgery)    Risks: Potential for delay in receiving treatment, Potential deterioration of medical condition, Loss of IV, Increased discomfort during transfer, Possible worsening of condition or death during transfer      Consent for Transfer:  I acknowledge that my medical condition has been evaluated and explained to me by the emergency department physician or other qualified medical person and/or my attending physician, who has recommended that I be transferred to the service of  Accepting Physician: Katya Benson at [address] [place] [address] Rd Name, [address] : Richard  The above potential benefits of such transfer, the potential risks associated with such transfer, and the probable risks of not being transferred have been explained to me, and I fully understand them  The doctor has explained that, in my case, the benefits of transfer outweigh the risks  I agree to be transferred  I authorize the performance of emergency medical procedures and treatments upon me in both transit and upon arrival at the receiving facility  Additionally, I authorize the release of any and all medical records to the receiving facility and request they be transported with me, if possible  I understand that the safest mode of transportation during a medical emergency is an ambulance and that the Hospital advocates the use of this mode of transport   Risks of traveling to the receiving facility by car, including absence of medical control, life sustaining equipment, such as oxygen, and medical personnel has been explained to me and I fully understand them  (ILAN CORRECT BOX BELOW)  [  ]  I consent to the stated transfer and to be transported by ambulance/helicopter  [  ]  I consent to the stated transfer, but refuse transportation by ambulance and accept full responsibility for my transportation by car  I understand the risks of non-ambulance transfers and I exonerate the Hospital and its staff from any deterioration in my condition that results from this refusal     X___________________________________________    DATE  20  TIME________  Signature of patient or legally responsible individual signing on patient behalf           RELATIONSHIP TO PATIENT_________________________          Provider Certification    NAME Alfred Rodriguez                                        [facility] 1935                              MRN [id number]    A medical screening exam was performed on the above named patient  Based on the examination:    Condition Necessitating Transfer The encounter diagnosis was Bilateral cold feet      Patient Condition: The patient has been stabilized such that within reasonable medical probability, no material deterioration of the patient condition or the condition of the unborn child(lizzy) is likely to result from the transfer    Reason for Transfer: Level of Care needed not available at this facility(Vascular surgery)    Transfer Requirements: Facility [facility] available and qualified personnel available for treatment as acknowledged by    · Agreed to accept transfer and to provide appropriate medical treatment as acknowledged by       Callie De La Cruz  · Appropriate medical records of the examination and treatment of the patient are provided at the time of transfer   [address] _______  · Transfer will be performed by qualified personnel from    and appropriate transfer equipment as required, including the use of necessary and appropriate life support measures  Provider Certification: I have examined the patient and explained the following risks and benefits of being transferred/refusing transfer to the patient/family:  General risk, such as traffic hazards, adverse weather conditions, rough terrain or turbulence, possible failure of equipment (including vehicle or aircraft), or consequences of actions of persons outside the control of the transport personnel      Based on these reasonable risks and benefits to the patient and/or the unborn child(lizzy), and based upon the information available at the time of the patients examination, I certify that the medical benefits reasonably to be expected from the provision of appropriate medical treatments at another medical facility outweigh the increasing risks, if any, to the individuals medical condition, and in the case of labor to the unborn child, from effecting the transfer      X____________________________________________ DATE 08/27/20        TIME_______      ORIGINAL - SEND TO MEDICAL RECORDS   COPY - SEND WITH PATIENT DURING TRANSFER

## 2020-08-27 NOTE — ASSESSMENT & PLAN NOTE
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

## 2020-08-27 NOTE — CONSULTS
Consultation - Vascular Surgery   Theron Ayers 80 y o  female MRN: [id number]  Unit/Bed#: ED 09 Encounter: [id number]    Assessment/Plan     Assessment:  81 yo F with h/o afib on xarelto developed LLE ischemic event  Slight motor and sensory deficit  Cannot determined if it's acute or chronic or acute on chronic  Dopplerable pulses in R DP/PT    Plan:  Recommend medicine admit  Will obtain stat CTA abdomen with runoff  Duplex study  Continue heparin drip    History of Present Illness   HPI: History was very limited since she is poor historian Theron Ayers is a 80 y o  female who h/o afib on xarelto, vascular dementia, stroke with right UE LE weakness presented to ED at [facility] with RLE pain and coldness  Heparin drip was initiated and the patient was transferred to [place] for vascular evaluation  She stated her feet has got occasionally cold and causing pain for the last several months  On arrival, patient complains L>R foot pain and coldness  There is skin color change on the top of left foot  Inpatient consult to Vascular Surgery  Consult performed by: Fran Cuba MD  Consult ordered by: Debora Jo MD          Review of Systems   Neurological: Positive for weakness  All other systems reviewed and are negative        Historical Information   Past Medical History:   Diagnosis Date    A-fib [facility])     Ambulatory dysfunction     Anemia     Aphasia     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, bilateral (HCC)     Chronic fatigue     Chronic pain     Falls frequently     GERD (gastroesophageal reflux disease)     Hyperlipidemia     Hypertension     Muscle weakness     Osteoporosis     Psychiatric disorder     Seizures ([facility] [address]     Stroke ([facility] [address] )     T6 vertebral fracture (HCC)      Past Surgical History:   Procedure Laterality Date    [place] OPEN RX FEMUR FX+INTRAMED PRISCILLA Left 7/25/2016    Procedure: INSERTION NAIL IM FEMUR ANTEGRADE (TROCHANTERIC); Surgeon: Martin Randolph MD;  Location: [facility] OR;  Service: Orthopedics    WRIST FRACTURE SURGERY       Social History   Social History     Substance and Sexual Activity   Alcohol Use Never    Frequency: Never    Binge frequency: Never     Social History     Substance and Sexual Activity   Drug Use No     E-Cigarette/Vaping    E-Cigarette Use Never User      E-Cigarette/Vaping Substances    Nicotine No     THC No     CBD No     Flavoring No     Other No     Unknown No      Social History     Tobacco Use   Smoking Status Never Smoker   Smokeless Tobacco Never Used   Tobacco Comment    Per [employer] Former smoker     Family History: non-contributory    Meds/Allergies   all current active meds have been reviewed  Allergies   Allergen Reactions    Ceftriaxone     Codeine     Levofloxacin Other (See Comments)       Objective   First Vitals:   Blood Pressure: 134/63 (08/27/20 0410)  Pulse: 56 (08/27/20 0410)  Temperature: 97 5 °F (36 4 °C) (08/27/20 0410)  Temp Source: Oral (08/27/20 0410)  Respirations: 19 (08/27/20 0410)  Height: 5' 6" (167 6 cm) (08/27/20 0410)  Weight - Scale: 66 7 kg (147 lb) (08/27/20 0410)  SpO2: 97 % (08/27/20 0412)    Current Vitals:   Blood Pressure: 151/67 (08/27/20 0615)  Pulse: (!) 50 (08/27/20 0615)  Temperature: 97 5 °F (36 4 °C) (08/27/20 0410)  Temp Source: Oral (08/27/20 0410)  Respirations: 15 (08/27/20 0615)  Height: 5' 6" (167 6 cm) (08/27/20 0410)  Weight - Scale: 66 7 kg (147 lb) (08/27/20 0410)  SpO2: 96 % (08/27/20 0615)    No intake or output data in the 24 hours ending 08/27/20 0822    Invasive Devices     Peripheral Intravenous Line            Peripheral IV 08/27/20 Left Antecubital less than 1 day    Peripheral IV 08/27/20 Right Hand less than 1 day                Physical Exam  Constitutional:       Appearance: Normal appearance  She is normal weight  HENT:      Head: Normocephalic and atraumatic        Right Ear: External ear normal       Left Ear: External ear normal       Nose: Nose normal       Mouth/Throat:      Mouth: Mucous membranes are moist    Neck:      Musculoskeletal: Normal range of motion and neck supple  Cardiovascular:      Rate and Rhythm: Regular rhythm  Comments: Pulse:   R palpable femoral, popliteal, DP  L Palpable femoral, dopplerable popliteal and DP/PT    LE  Both feet cool, purple skin discoloration in L foot, minimum tenderness  Neurological:      Mental Status: She is alert  Comments: RUE, RLE weakness  LLE slight motor and sensory deficit   Psychiatric:         Mood and Affect: Mood normal          Behavior: Behavior normal          Thought Content: Thought content normal          Judgment: Judgment normal          Lab Results: I have personally reviewed pertinent lab results  Imaging: I have personally reviewed pertinent reports  EKG, Pathology, and Other Studies: I have personally reviewed pertinent reports  Counseling / Coordination of Care  Total floor / unit time spent today 30 minutes  Greater than 50% of total time was spent with the patient and / or family counseling and / or coordination of care  A description of the counseling / coordination of care: Pablo Crawford

## 2020-08-27 NOTE — H&P
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  Assessment & Plan  · Presented to [facility] with cold left foot, concern noted for possible ischemia of leg the patient and started on heparin GTT and transferred to this hospital for vascular surgery evaluation  Remains with palpable pulses and otherwise hemodynamically stable  · Appreciate early vascular recommendations, CTA runoff is to be obtained along with LEADs  · Will continue heparin GTT for now  · Keep NPO pending final vascular surgery recommendations  · Neurovascular checks    Hyperthyroidism  Assessment & Plan  · Chronic and stable  · Continue methimazole    Vascular dementia without behavioral disturbance (HCC)  Assessment & Plan  · Somewhat slow to answer questions however AAO x3  · Fall and delirium precautions    Essential hypertension  Assessment & Plan  · Continue metoprolol tartrate with hold parameters and monitor blood pressure per protocol    Paroxysmal atrial fibrillation (HCC)  Assessment & Plan  · Currently rate controlled on metoprolol  · Normally anticoagulated on Xarelto, currently holding and patient is on heparin GTT    Seizure disorder ([facility] [address] )  Assessment & Plan  · No recent reported seizures  · Continue Keppra        VTE Prophylaxis: Heparin Drip  / sequential compression device   Code Status: Level 3 - DNAR and DNI  Per patient  POLST: POLST form is not discussed and not completed at this time  Anticipated Length of Stay:  Patient will be admitted on an Inpatient basis with an anticipated length of stay of  Greater than 2 midnights  Justification for Hospital Stay: Please see detailed plans noted above      Chief Complaint:     Foot pain  History of Present Illness:  Steven Ge is a 80 y o  female who has past medical history significant for atrial fibrillation on Xarelto, seizure disorder on Keppra, vascular dementia, hyperlipidemia, hypertension, GERD  [facility] [address]  [facility] with complaints of cold feet and left foot discoloration  Some history limited as patient limited historian due to baseline dementia  Patient apparently had Doppler study April 2020 of legs noted intermittent pain since, however at some point history evening had noted both feet, particularly left foot, had become cold  Patient was unsure if there is any color change  Concern noted in [facility] ED for possible ischemic leg, and patient started heparin GTT case discussed between ED physician and vascular surgery team, who advised transferred to this hospital for further evaluation  Subsequently shortly after arrival by vascular surgery team, who advised CTA runoff and LEADs with further recommendations to follow after these are completed  At time of my evaluation, patient is lying bed in no acute distress  Continues to cold left foot unchanged from prior  Denies numbness/tingling extremity, and otherwise denies fevers/chills, chest pain/pressure, shortness of breath, dizziness/lightheadedness, or abdominal pain/nausea/vomiting/diarrhea      Review of Systems:    Constitutional:  Denies fever or chills   Eyes:  Denies change in visual acuity   HENT:  Denies nasal congestion or sore throat   Respiratory:  Denies cough or shortness of breath   Cardiovascular:  Denies chest pain or edema   GI:  Denies abdominal pain, nausea, vomiting, bloody stools or diarrhea   :  Denies dysuria   Musculoskeletal:  Denies back pain but endorses cold left foot  Integument:  Denies rash but endorses color change  Neurologic:  Denies headache, focal weakness or sensory changes   Endocrine:  Denies polyuria or polydipsia   Lymphatic:  Denies swollen glands   Psychiatric:  Denies depression or anxiety     Past Medical and Surgical History:   Past Medical History:   Diagnosis Date    A-fib [facility])     Ambulatory dysfunction     Anemia     Aphasia     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 [address] )     Stroke [address] [address] )     T6 vertebral fracture (HCC)      Past Surgical History:   Procedure Laterality Date    [place] OPEN RX FEMUR FX+INTRAMED PRISCILLA Left 7/25/2016    Procedure: INSERTION NAIL IM FEMUR ANTEGRADE (TROCHANTERIC);   Surgeon: Juan Ryan MD;  Location: [facility] OR;  Service: Orthopedics    WRIST FRACTURE SURGERY         Meds/Allergies:    Current Facility-Administered Medications:     APAP 650 mg, 650 mg, Oral, Q6H PRN, Daylene Falling, DO    artificial tear (LUBRIFRESH P M ) ophthalmic ointment, , Both Eyes, HS, Daylene Falling, DO    calcium carbonate (OYSTER SHELL,OSCAL) 500 mg tablet 2 tablet, 2 tablet, Oral, Daily With Breakfast, Daylene Falling, DO    cholecalciferol (VITAMIN D3) tablet 1,000 Units, 1,000 Units, Oral, QAM, Daylene Falling, DO    heparin (VTE/PE) high, , Intravenous, Once, Daylene Falling, DO    lactulose 20 g/30 mL oral solution 10 g, 10 g, Oral, Daily, Daylene Falling, DO    levETIRAcetam (KEPPRA) tablet 1,000 mg, 1,000 mg, Oral, BID, Daylene Falling, DO    magnesium hydroxide (MILK OF MAGNESIA) 400 mg/5 mL oral suspension 30 mL, 30 mL, Oral, Daily PRN, Daylene Falling, DO    methimazole (TAPAZOLE) tablet 10 mg, 10 mg, Oral, Q8H [address], Daylene Falling, DO    metoprolol tartrate (LOPRESSOR) tablet 50 mg, 50 mg, Oral, Q8H, Daylene Falling, DO    multivitamin-minerals (CENTRUM) tablet 1 tablet, 1 tablet, Oral, BID, Daylene Falling, DO    ondansetron [facility]) injection 4 mg, 4 mg, Intravenous, Q6H PRN, Daylene Falling, DO    Current Outpatient Medications:     Acetaminophen (APAP) 325 MG tablet, Take 650 mg by mouth every 6 (six) hours as needed for mild pain , Disp: , Rfl:     artificial tear (LUBRIFRESH P M ) 83-15 % ophthalmic ointment, Administer to both eyes daily at bedtime, Disp: , Rfl: 0    calcium carbonate (OYSTER SHELL,OSCAL) 500 mg, 2 TABS (1000MG) ORALLY EVERY MORNING DX: SUPPLEMENT, Disp: 24 tablet, Rfl: 4    carboxymethylcellulose (Refresh Tears) 0 5 % SOLN, Administer 1 drop to both eyes 4 (four) times a day, Disp: , Rfl:     cholecalciferol (VITAMIN D3) 1,000 units tablet, 1 TAB ORALLY EVERY MORNING DX: SUPPLEMENT, Disp: 12 tablet, Rfl: 4    Cholecalciferol (VITAMIN D3) 25 MCG (1000 UT) CAPS, Take 1 capsule by mouth every morning, Disp: , Rfl:     lactulose (CEPHULAC) 10 G packet, Take 10 g by mouth daily  , Disp: , Rfl:     levETIRAcetam (KEPPRA) 1000 MG tablet, 1 TAB ORALLY TWICE DAILY DX:PSYCHOSIS, Disp: 56 tablet, Rfl: 4    magnesium hydroxide (MILK OF MAGNESIA) 400 mg/5 mL oral suspension, Take 30 mL by mouth daily as needed for constipation, Disp: , Rfl:     metoprolol tartrate (LOPRESSOR) 50 mg tablet, Take 1 tablet (50 mg total) by mouth every 8 (eight) hours, Disp: 84 tablet, Rfl: 4    Multiple Vitamins-Minerals (PRESERVISION AREDS PO), Take 1 capsule by mouth 2 (two) times a day , Disp: , Rfl:     XARELTO 20 MG tablet, 1 TAB ORALLY EVERY MORNING DX:BLOOD CLOT PREVENTION *COLD SEAL*, Disp: 28 tablet, Rfl: 4    methimazole (TAPAZOLE) 10 mg tablet, 1 TAB ORALLY 3 TIMES DAILY DX: HYPERTHYROIDISM **NIOSH 2 HAZARDOUS DRUG**, Disp: 84 tablet, Rfl: 4    Allergies:    Allergies   Allergen Reactions    Ceftriaxone     Codeine     Levofloxacin Other (See Comments)     History:  Marital Status:      Substance Use History:   Social History     Substance and Sexual Activity   Alcohol Use Never    Frequency: Never    Binge frequency: Never     Social History     Tobacco Use   Smoking Status Never Smoker   Smokeless Tobacco Never Used   Tobacco Comment    Per [employer] Former smoker     Social History     Substance and Sexual Activity   Drug Use No       Family History:  Family History   Problem Relation Age of Onset    Heart disease Mother     Heart disease Father     Dementia Sister     Mental illness Sister     Substance Abuse Neg Hx         unknown fam hx       Physical Exam:     Vitals:   Blood Pressure: 151/67 (08/27/20 0615)  Pulse: (!) 50 (08/27/20 0615)  Temperature: 97 5 °F (36 4 °C) (08/27/20 0410)  Temp Source: Oral (08/27/20 0410)  Respirations: 15 (08/27/20 0615)  Height: 5' 6" (167 6 cm) (08/27/20 0410)  Weight - Scale: 66 7 kg (147 lb) (08/27/20 0410)  SpO2: 96 % (08/27/20 0615)    Constitutional:  Well developed, well nourished, no acute distress, non-toxic appearance   Eyes:  PERRL, conjunctiva normal   HENT:  Atraumatic, external ears normal, nose normal, oropharynx moist, no pharyngeal exudates  Neck- normal range of motion, no tenderness, supple   Respiratory:  No respiratory distress, normal breath sounds, no rales, no wheezing   Cardiovascular:  Normal rate, normal rhythm, no murmurs, no gallops, no rubs   GI:  Soft, nondistended, normal bowel sounds, nontender, no organomegaly, no mass, no rebound, no guarding   :  No costovertebral angle tenderness   Musculoskeletal:  No edema, no tenderness, no deformities  Back- no tenderness  Integument:  Well hydrated, left foot cool to touch compared to right and with discoloration  Lymphatic:  No lymphadenopathy noted   Neurologic:  Alert &awake, communicative, CN 2-12 normal, normal motor function, normal sensory function, no focal deficits noted   Psychiatric:  Speech and behavior appropriate       Lab Results: I have personally reviewed pertinent reports        Results from last 7 days   Lab Units 08/27/20  0113   WBC Thousand/uL 8 20   HEMOGLOBIN g/dL 14 2   HEMATOCRIT % 41 9   PLATELETS Thousands/uL 199   NEUTROS PCT % 67   LYMPHS PCT % 24   MONOS PCT % 7   EOS PCT % 2     Results from last 7 days   Lab Units 08/27/20  0113   POTASSIUM mmol/L 4 6   CHLORIDE mmol/L 104   CO2 mmol/L 30   BUN mg/dL 17   CREATININE mg/dL 0 63   CALCIUM mg/dL 9 7     Results from last 7 days   Lab Units 08/27/20  0113   INR  1 11       EKG:  Sinus bradycardia    Imaging: I have personally reviewed pertinent reports  Xr Foot 2 Vw Left    Result Date: 8/27/2020  Narrative: LEFT FOOT INDICATION:   pain  COMPARISON:  07/26/2016 VIEWS:  XR FOOT 2 VW LEFT Images: 2 FINDINGS: There is no acute fracture or dislocation  There is a hallux valgus deformity  There are calcaneal spurs present  The osseous structures are demineralized  No lytic or blastic osseous lesion  Soft tissues are unremarkable  Impression: No acute osseous abnormality  Workstation performed: [id number]     Xr Foot 2 Vw Right    Result Date: 8/27/2020  Narrative: RIGHT FOOT INDICATION:   pain  COMPARISON:  08/28/2019 VIEWS:  XR FOOT 2 VW RIGHT Images: 2 FINDINGS: There is no acute fracture or dislocation  A hallux valgus deformity is again noted  There are multiple hammertoe deformities  The osseous structures are demineralized  No focal areas of osseous destruction are seen  No lytic or blastic osseous lesion  Soft tissues are unremarkable  Impression: No acute osseous abnormality  Workstation performed: [id number]         ** Please Note: Dragon 360 Dictation voice to text software was used in the creation of this document   **

## 2020-08-27 NOTE — PROGRESS NOTES
Patient unable to stay awake long enough for safe administration of oral medications  Continue to monitor

## 2020-08-27 NOTE — ED PROVIDER NOTES
History  Chief Complaint   Patient presents with    Foot Pain     pt transfered from   pt complains of b/l foot pain, cool temperature, and decreased circulation  70-year-old female presents as a transfer for vascular consult  Patient initially presented to [facility] for foot pain, patient states she has bilateral feet pain however left is worse  Patient is poor historian, she notes previous procedure performed after which she has had chronic foot pain  Patient states her left foot often becomes discolored, she believes it is previously been worse than what it is tonight although pain is worse  On arrival patient's bilateral feet are cool, her left foot is discolored compared to her right, she has dopplerable pulses  Patient was placed on heparin drip prior to transfer  Prior to Admission Medications   Prescriptions Last Dose Informant Patient Reported? Taking? Acetaminophen (APAP) 325 MG tablet Past Week at Unknown time  Yes Yes   Sig: Take 650 mg by mouth every 6 (six) hours as needed for mild pain     Cholecalciferol (VITAMIN D3) 25 MCG (1000 UT) CAPS 2020 at Unknown time  Yes Yes   Sig: Take 1 capsule by mouth every morning   Multiple Vitamins-Minerals (PRESERVISION AREDS PO) 2020 at Unknown time  Yes Yes   Sig: Take 1 capsule by mouth 2 (two) times a day    XARELTO 20 MG tablet 2020 at Unknown time  No Yes   Si TAB ORALLY EVERY MORNING DX:BLOOD CLOT PREVENTION *COLD SEAL*   artificial tear (LUBRIFRESH P M ) 83-15 % ophthalmic ointment 2020 at Unknown time  No Yes   Sig: Administer to both eyes daily at bedtime   calcium carbonate (OYSTER SHELL,OSCAL) 500 mg 2020 at Unknown time  No Yes   Si TABS (1000MG) ORALLY EVERY MORNING DX: SUPPLEMENT   carboxymethylcellulose (Refresh Tears) 0 5 % SOLN 2020 at Unknown time  Yes Yes   Sig: Administer 1 drop to both eyes 4 (four) times a day   cholecalciferol (VITAMIN D3) 1,000 units tablet 2020 at Unknown time  No Yes   Si TAB ORALLY EVERY MORNING DX: SUPPLEMENT   lactulose (CEPHULAC) 10 G packet 2020 at Unknown time  Yes Yes   Sig: Take 10 g by mouth daily  levETIRAcetam (KEPPRA) 1000 MG tablet 2020 at Unknown time  No Yes   Si TAB ORALLY TWICE DAILY DX:PSYCHOSIS   magnesium hydroxide (MILK OF MAGNESIA) 400 mg/5 mL oral suspension 2020 at Unknown time  Yes Yes   Sig: Take 30 mL by mouth daily as needed for constipation   methimazole (TAPAZOLE) 10 mg tablet   No No   Si TAB ORALLY 3 TIMES DAILY DX: HYPERTHYROIDISM **NIOSH 2 HAZARDOUS DRUG**   metoprolol tartrate (LOPRESSOR) 50 mg tablet 2020 at Unknown time  No Yes   Sig: Take 1 tablet (50 mg total) by mouth every 8 (eight) hours      Facility-Administered Medications: None       Past Medical History:   Diagnosis Date    A-fib ([facility] )     Ambulatory dysfunction     Anemia     Aphasia     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, bilateral (HCC)     Chronic fatigue     Chronic pain     Falls frequently     GERD (gastroesophageal reflux disease)     Hyperlipidemia     Hypertension     Muscle weakness     Osteoporosis     Psychiatric disorder     Seizures (HCC)     Stroke ([facility] )     [facility]6 [facility] fracture ([facility])        Past Surgical History:   Procedure Laterality Date    MA OPEN RX FEMUR FX+INTRAMED PRISCILLA Left 2016    Procedure: INSERTION NAIL IM FEMUR ANTEGRADE (TROCHANTERIC); Surgeon: Dena Stoddard MD;  Location: [facility];  Service: Orthopedics    WRIST FRACTURE SURGERY         Family History   Problem Relation Age of Onset    Heart disease Mother     Heart disease Father     Dementia Sister     Mental illness Sister     Substance Abuse Neg Hx         unknown fam hx     I have reviewed and agree with the history as documented      E-Cigarette/Vaping    E-Cigarette Use Never User      E-Cigarette/Vaping Substances    Nicotine No     THC No     CBD No     Flavoring No     Other No     Unknown No      Social History     Tobacco Use    Smoking status: Never Smoker    Smokeless tobacco: Never Used    Tobacco comment: Per [employer] Former smoker   Substance Use Topics    Alcohol use: Never     Frequency: Never     Binge frequency: Never    Drug use: No        Review of Systems   Constitutional: Negative for appetite change, chills and fever  Cardiovascular: Negative for chest pain  Gastrointestinal: Negative for abdominal pain  Musculoskeletal: Positive for gait problem and myalgias  Skin: Positive for color change  All other systems reviewed and are negative  Physical Exam  ED Triage Vitals   Temperature Pulse Respirations Blood Pressure SpO2   08/27/20 0410 08/27/20 0410 08/27/20 0410 08/27/20 0410 08/27/20 0412   97 5 °F (36 4 °C) 56 19 134/63 97 %      Temp Source Heart Rate Source Patient Position - Orthostatic VS BP Location FiO2 (%)   08/27/20 0410 08/27/20 0410 08/27/20 0410 08/27/20 0410 --   Oral Monitor Lying Right arm       Pain Score       08/27/20 1436       5             Orthostatic Vital Signs  Vitals:    08/27/20 1200 08/27/20 1314 08/27/20 1519 08/27/20 2209   BP: 133/64 133/67 130/67 124/66   Pulse: 58 58 64 69   Patient Position - Orthostatic VS:    Lying       Physical Exam  Vitals signs reviewed  Constitutional:       General: She is not in acute distress  Comments: Chronically ill appearing   HENT:      Head: Normocephalic and atraumatic  Eyes:      General:         Right eye: No discharge  Left eye: No discharge  Cardiovascular:      Rate and Rhythm: Normal rate and regular rhythm  Pulmonary:      Effort: Pulmonary effort is normal  No respiratory distress  Breath sounds: Normal breath sounds  Abdominal:      General: There is no distension  Palpations: Abdomen is soft  Tenderness: There is no abdominal tenderness  There is no guarding or rebound     Musculoskeletal:         General: Tenderness (tenderness to anterior left foot, shin) present  No deformity or signs of injury  Right lower leg: No edema  Left lower leg: No edema  Skin:     General: Skin is cool  Comments: B/l feet cool, left foot discolored compared to right   Neurological:      General: No focal deficit present  Mental Status: She is alert and oriented to person, place, and time        Comments: Patient is orientated, can move all extremities however is delayed with movements and decreased ROM         ED Medications  Medications   artificial tear (LUBRIFRESH P M ) ophthalmic ointment ( Both Eyes Given 8/27/20 2148)   calcium carbonate (OYSTER SHELL,OSCAL) 500 mg tablet 2 tablet (2 tablets Oral Not Given 8/27/20 1547)   lactulose 20 g/30 mL oral solution 10 g (10 g Oral Not Given 8/27/20 1549)   cholecalciferol (VITAMIN D3) tablet 1,000 Units (1,000 Units Oral Not Given 8/27/20 1548)   methimazole (TAPAZOLE) tablet 10 mg (10 mg Oral Not Given 8/27/20 2149)   magnesium hydroxide (MILK OF MAGNESIA) 400 mg/5 mL oral suspension 30 mL (has no administration in time range)   multivitamin-minerals (CENTRUM) tablet 1 tablet (1 tablet Oral Not Given 8/27/20 1550)   ondansetron (ZOFRAN) injection 4 mg (has no administration in time range)   heparin (porcine) 25,000 units in 0 45% NaCl 250 mL infusion (premix) (0 Units/kg/hr × 65 kg (Order-Specific) Intravenous Hold 8/28/20 0404)   heparin (porcine) injection 5,200 Units (has no administration in time range)   heparin (porcine) injection 2,600 Units (has no administration in time range)   acetaminophen (TYLENOL) tablet 650 mg (650 mg Oral Given 8/27/20 2048)   gabapentin (NEURONTIN) capsule 100 mg (100 mg Oral Not Given 8/27/20 2147)   levETIRAcetam (KEPPRA) 1,000 mg in sodium chloride 0 9 % 100 mL IVPB (1,000 mg Intravenous New Bag 8/27/20 2148)   metoprolol (LOPRESSOR) injection 2 5 mg (2 5 mg Intravenous Given 8/27/20 2149)   iohexol (OMNIPAQUE) 350 MG/ML injection (MULTI-DOSE) 110 mL (110 mL Intravenous Given 8/27/20 0643)       Diagnostic Studies  Results Reviewed     Procedure Component Value Units Date/Time    APTT [[id number]]     Lab Status:  No result Specimen:  Blood     APTT [[id number]]  (Abnormal) Collected:  08/27/20 1814    Lab Status:  Final result Specimen:  Blood from Arm, Left Updated:  08/27/20 2005      seconds     Narrative:       No clots      APTT [[id number]]     Lab Status:  No result Specimen:  Blood     APTT [[id number]]  (Abnormal) Collected:  08/27/20 0821    Lab Status:  Final result Specimen:  Blood from Arm, Left Updated:  08/27/20 0959     PTT >210 seconds     Fibrinogen [[id number]]  (Normal) Collected:  08/27/20 0821    Lab Status:  Final result Specimen:  Blood from Arm, Left Updated:  08/27/20 0959     Fibrinogen 316 mg/dL     APTT [[id number]]     Lab Status:  No result Specimen:  Blood                  CT head wo contrast   Final Result by Italo Jones DO (08/27 1999)      No acute intracranial abnormality within limitations of recent contrast imaging  Workstation performed: [id number]         VAS lower limb arterial duplex, complete bilateral   Final Result by Cristina Gomez DO (08/27 1651)      CTA abdominal w run off w wo contrast    (Results Pending)         Procedures  Procedures      ED Course  ED Course as of Aug 28 0516   Thu Aug 27, 2020   3104 Vascular surgery to see                                              MDM  Number of Diagnoses or Management Options  Bilateral cold feet:   Diagnosis management comments: 81yo female presents from [address] for vascular surgery evaluation of cool feet, left discolored  Pulses obtained via doppler on arrival and heparin drip continued  Vascular surgery saw pt in ED and recommend SLIM admission, CTA run-off study          Disposition  Final diagnoses:   Bilateral cold feet     Time reflects when diagnosis was documented in both MDM as applicable and the Disposition within this note     Time User Action Codes Description Comment    8/27/2020  6:11 AM Brandon Sanchez Add [R20 9] Bilateral cold feet     8/27/2020 11:43 AM SharonrichardRajani Add [I99 8] 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       ED Disposition     ED Disposition Condition Date/Time Comment    Admit Stable Thu Aug 27, 2020  6:11 AM Case was discussed with SHALA and the patient's admission status was agreed to be Admission Status: inpatient status to the service of Dr Dieudonne Whatley   Follow-up Information    None         Current Discharge Medication List      CONTINUE these medications which have NOT CHANGED    Details   Acetaminophen (APAP) 325 MG tablet Take 650 mg by mouth every 6 (six) hours as needed for mild pain  artificial tear (LUBRIFRESH P M ) 83-15 % ophthalmic ointment Administer to both eyes daily at bedtime  Refills: 0    Associated Diagnoses: Vascular dementia without behavioral disturbance (HCC)      calcium carbonate (OYSTER SHELL,OSCAL) 500 mg 2 TABS (1000MG) ORALLY EVERY MORNING DX: SUPPLEMENT  Qty: 24 tablet, Refills: 4    Associated Diagnoses: Vitamin D deficiency      carboxymethylcellulose (Refresh Tears) 0 5 % SOLN Administer 1 drop to both eyes 4 (four) times a day      cholecalciferol (VITAMIN D3) 1,000 units tablet 1 TAB ORALLY EVERY MORNING DX: SUPPLEMENT  Qty: 12 tablet, Refills: 4    Associated Diagnoses: Vascular dementia without behavioral disturbance (HCC)      Cholecalciferol (VITAMIN D3) 25 MCG (1000 UT) CAPS Take 1 capsule by mouth every morning      lactulose (CEPHULAC) 10 G packet Take 10 g by mouth daily        levETIRAcetam (KEPPRA) 1000 MG tablet 1 TAB ORALLY TWICE DAILY DX:PSYCHOSIS  Qty: 56 tablet, Refills: 4    Associated Diagnoses: Seizure disorder (HCC)      magnesium hydroxide (MILK OF MAGNESIA) 400 mg/5 mL oral suspension Take 30 mL by mouth daily as needed for constipation      metoprolol tartrate (LOPRESSOR) 50 mg tablet Take 1 tablet (50 mg total) by mouth every 8 (eight) hours  Qty: 84 tablet, Refills: 4    Associated Diagnoses: Essential hypertension      Multiple Vitamins-Minerals (PRESERVISION AREDS PO) Take 1 capsule by mouth 2 (two) times a day       XARELTO 20 MG tablet 1 TAB ORALLY EVERY MORNING DX:BLOOD CLOT PREVENTION *COLD SEAL*  Qty: 28 tablet, Refills: 4    Associated Diagnoses: Paroxysmal atrial fibrillation (HCC)      methimazole (TAPAZOLE) 10 mg tablet 1 TAB ORALLY 3 TIMES DAILY DX: HYPERTHYROIDISM **NIOSH 2 HAZARDOUS DRUG**  Qty: 84 tablet, Refills: 4    Associated Diagnoses: Hyperthyroidism           No discharge procedures on file  PDMP Review       Value Time User    PDMP Reviewed  Yes 8/27/2020  6:29 AM Lorenza Subramanian DO           ED Provider  Attending physically available and evaluated Alfred Rodriguez I managed the patient along with the ED Attending      Electronically Signed by         oJsie Orr DO  08/28/20 9315

## 2020-08-27 NOTE — PROGRESS NOTES
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  Assessment & Plan  · Presented to [facility] with cold left foot, concern noted for possible ischemia of leg the patient and started on heparin GTT and transferred to this hospital for vascular surgery evaluation  Remains with palpable pulses and otherwise hemodynamically stable  · Appreciate early vascular recommendations, CTA runoff is to be obtained along with LEADs  · Will continue heparin GTT for now  · Vascular surgery input appreciated, upon their review CTA demonstrated 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  Vascular surgery d/w patient son, who does not wish for any invasive intervention at this time  Opt for medical management  Continue heparin gtt at this time  Son understands risk of limb loss/amputation   · Consult palliative care  · PRN pain control   · Give diet as no procedure planned at this time per family wishes  · Neurovascular checks    Hyperthyroidism  Assessment & Plan  · Chronic and stable  · Continue methimazole    Vascular dementia without behavioral disturbance ([facility] )  Assessment & Plan  · Somewhat slow to answer questions however AAO x3  · Fall and delirium precautions    Essential hypertension  Assessment & Plan  · Continue metoprolol tartrate with hold parameters and monitor blood pressure per protocol    Paroxysmal atrial fibrillation (HCC)  Assessment & Plan  · Currently rate controlled on metoprolol  · Normally anticoagulated on Xarelto, currently holding and patient is on heparin GTT    Seizure disorder ([facility] )  Assessment & Plan  · No recent reported seizures  · Continue Keppra        The patient has no acute complaints, states she has no pain  She is laying in bed, frail appearing, NAD   ROM intact  D/w son over phone  He is aware of findings and does not wish for invasive procedure at this time and opts for medical management  He is aware of risk of limb loss/amputation, he is not sure what his decision would be regarding this  At this point he would like to see if she improves with medical management and otherwise wants her to remain comfortable, will discuss further pending clinical course

## 2020-08-27 NOTE — ED PROVIDER NOTES
History  Chief Complaint   Patient presents with    Foot Pain     Pt arrived via EMS from [facility] with c/o severe b/l foot pain and L foot discoloration  B/l le are cool to touch  Pt sts that she had a doppler study competed in April of this year and has been having intermittent pain since  History provided by:  Patient   used: No      Patient is 15-year-old female presenting to emergency department with bilateral foot pain and left foot discoloration  Patient had Doppler studies done April, no results available  Has been having intermittent pain since then  Patient is on Xarelto for AFib  She is not sure if the color is far from her baseline  Patient does have vascular dementia diagnosis but oriented x3 at this time  No calf pain, tenderness or change in color  Femoral pulses intact  MDM will do x-ray, check baseline labs, lactic acid, coags, discuss with vascular surgery    Prior to Admission Medications   Prescriptions Last Dose Informant Patient Reported? Taking? Acetaminophen (APAP) 325 MG tablet   Yes No   Sig: Take 650 mg by mouth every 6 (six) hours as needed for mild pain     Cholecalciferol (VITAMIN D3) 25 MCG (1000 UT) CAPS   Yes No   Sig: Take 1 capsule by mouth every morning   Multiple Vitamins-Minerals (PRESERVISION AREDS PO)   Yes No   Sig: Take 1 capsule by mouth 2 (two) times a day    XARELTO 20 MG tablet   No No   Si TAB ORALLY EVERY MORNING DX:BLOOD CLOT PREVENTION *COLD SEAL*   artificial tear (LUBRIFRESH P M ) 83-15 % ophthalmic ointment   No No   Sig: Administer to both eyes daily at bedtime   calcium carbonate (OYSTER SHELL,OSCAL) 500 mg   No No   Si TABS (1000MG) ORALLY EVERY MORNING DX: SUPPLEMENT   carboxymethylcellulose (Refresh Tears) 0 5 % SOLN   Yes No   Sig: Administer 1 drop to both eyes 4 (four) times a day   cholecalciferol (VITAMIN D3) 1,000 units tablet   No No   Si TAB ORALLY EVERY MORNING DX: SUPPLEMENT   lactulose ([address]rish[address] [place] [address]) 10 G packet   Yes No   Sig: Take 10 g by mouth daily  levETIRAcetam (KEPPRA) 1000 MG tablet   No No   Si TAB ORALLY TWICE DAILY DX:PSYCHOSIS   magnesium hydroxide (MILK OF MAGNESIA) 400 mg/5 mL oral suspension   Yes No   Sig: Take 30 mL by mouth daily as needed for constipation   methimazole (TAPAZOLE) 10 mg tablet   No No   Si TAB ORALLY 3 TIMES DAILY DX: HYPERTHYROIDISM **NIOSH 2 HAZARDOUS DRUG**   metoprolol tartrate (LOPRESSOR) 50 mg tablet   No No   Sig: Take 1 tablet (50 mg total) by mouth every 8 (eight) hours      Facility-Administered Medications: None       Past Medical History:   Diagnosis Date    A-fib ([facility] [place] [facility] )     Ambulatory dysfunction     Anemia     Aphasia     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, bilateral (HCC)     Chronic fatigue     Chronic pain     Falls frequently     GERD (gastroesophageal reflux disease)     Hyperlipidemia     Hypertension     Muscle weakness     Osteoporosis     Psychiatric disorder     Seizures (HCC)     Stroke ([facility] [place] [facility] )     [facility]6 [facility] fracture ([facility])        Past Surgical History:   Procedure Laterality Date    AK OPEN RX FEMUR FX+INTRAMED PRISCILLA Left 2016    Procedure: INSERTION NAIL IM FEMUR ANTEGRADE (TROCHANTERIC); Surgeon: Martin Randolph MD;  Location: [facility] OR;  Service: Orthopedics    WRIST FRACTURE SURGERY         Family History   Problem Relation Age of Onset    Heart disease Mother     Heart disease Father     Dementia Sister     Mental illness Sister     Substance Abuse Neg Hx         unknown fam hx     I have reviewed and agree with the history as documented      E-Cigarette/Vaping    E-Cigarette Use Never User      E-Cigarette/Vaping Substances    Nicotine No     THC No     CBD No     Flavoring No     Other No     Unknown No      Social History     Tobacco Use    Smoking status: Never Smoker    Smokeless tobacco: Never Used    Tobacco comment: Per [employer] Former smoker Substance Use Topics    Alcohol use: Never     Frequency: Never     Binge frequency: Never    Drug use: No       Review of Systems   Constitutional: Negative for chills, diaphoresis and fever  Respiratory: Negative for cough, shortness of breath, wheezing and stridor  Cardiovascular: Negative for chest pain, palpitations and leg swelling  Gastrointestinal: Negative for abdominal pain, blood in stool, diarrhea, nausea and vomiting  Genitourinary: Negative for dysuria, frequency and urgency  Musculoskeletal: Negative for neck stiffness  Skin: Positive for color change  Negative for pallor and rash  Neurological: Negative for dizziness, syncope, weakness, light-headedness and headaches  All other systems reviewed and are negative  Physical Exam  Physical Exam  Vitals signs reviewed  Constitutional:       General: She is not in acute distress  Appearance: She is normal weight  She is not ill-appearing  HENT:      Head: Normocephalic and atraumatic  Mouth/Throat:      Mouth: Mucous membranes are dry  Eyes:      Extraocular Movements: Extraocular movements intact  Neck:      Musculoskeletal: Normal range of motion  Cardiovascular:      Rate and Rhythm: Normal rate  Pulses: Normal pulses  Pulmonary:      Effort: Pulmonary effort is normal    Musculoskeletal:      Comments: Decreased range of motion of the feet  Dopplerable pulses  Both feet cold, left worse than right  Left foot purple and blue  Skin:     Capillary Refill: Capillary refill takes less than 2 seconds  Neurological:      General: No focal deficit present  Mental Status: She is alert           Vital Signs  ED Triage Vitals [08/27/20 0050]   Temperature Pulse Respirations Blood Pressure SpO2   97 6 °F (36 4 °C) 58 16 132/88 98 %      Temp Source Heart Rate Source Patient Position - Orthostatic VS BP Location FiO2 (%)   Oral Monitor Sitting Right arm --      Pain Score       Worst Possible Pain Vitals:    08/27/20 0050 08/27/20 0215 08/27/20 0230 08/27/20 0300   BP: 132/88 126/58 122/61 140/63   Pulse: 58 (!) 52 (!) 52 (!) 48   Patient Position - Orthostatic VS: Sitting Sitting Lying Lying         Visual Acuity      ED Medications  Medications   acetaminophen (TYLENOL) tablet 650 mg (650 mg Oral Given 8/27/20 0113)   heparin (porcine) injection 5,200 Units (5,200 Units Intravenous Given 8/27/20 0224)       Diagnostic Studies  Results Reviewed     Procedure Component Value Units Date/Time    Lactic acid [[id number]]  (Normal) Collected:  08/27/20 0113    Lab Status:  Final result Specimen:  Blood from Arm, Left Updated:  08/27/20 0144     LACTIC ACID 1 5 mmol/L     Narrative:       Result may be elevated if tourniquet was used during collection      Reji Young [[id number]]  (Normal) Collected:  08/27/20 0113    Lab Status:  Final result Specimen:  Blood from Arm, Left Updated:  08/27/20 0140     Protime 14 4 seconds      INR 1 11    APTT [[id number]]  (Abnormal) Collected:  08/27/20 0113    Lab Status:  Final result Specimen:  Blood from Arm, Left Updated:  08/27/20 0140     PTT 40 seconds     Basic metabolic panel [[id number]] Collected:  08/27/20 0113    Lab Status:  Final result Specimen:  Blood from Arm, Left Updated:  08/27/20 0136     Sodium 140 mmol/L      Potassium 4 6 mmol/L      Chloride 104 mmol/L      CO2 30 mmol/L      ANION GAP 6 mmol/L      BUN 17 mg/dL      Creatinine 0 63 mg/dL      Glucose 84 mg/dL      Calcium 9 7 mg/dL      eGFR 82 ml/min/1 73sq m     Narrative:       [place] guidelines for Chronic Kidney Disease (CKD):     Stage 1 with normal or high GFR (GFR > 90 mL/min/1 73 square meters)    Stage 2 Mild CKD (GFR = 60-89 mL/min/1 73 square meters)    Stage 3A Moderate CKD (GFR = 45-59 mL/min/1 73 square meters)    Stage 3B Moderate CKD (GFR = 30-44 mL/min/1 73 square meters)    Stage 4 Severe CKD (GFR = 15-29 mL/min/1 73 square meters)    Stage 5 End Stage CKD (GFR <15 mL/min/1 73 square meters)  Note: GFR calculation is accurate only with a steady state creatinine    CBC and differential [[id number]]  (Normal) Collected:  08/27/20 0113    Lab Status:  Final result Specimen:  Blood from Arm, Left Updated:  08/27/20 0125     WBC 8 20 Thousand/uL      RBC 4 44 Million/uL      Hemoglobin 14 2 g/dL      Hematocrit 41 9 %      MCV 94 fL      MCH 32 0 pg      MCHC 33 9 g/dL      RDW 14 7 %      MPV 10 1 fL      Platelets 089 Thousands/uL      Neutrophils Relative 67 %      Immat GRANS % 0 %      Lymphocytes Relative 24 %      Monocytes Relative 7 %      Eosinophils Relative 2 %      Basophils Relative 0 %      Neutrophils Absolute 5 52 Thousands/µL      Immature Grans Absolute 0 02 Thousand/uL      Lymphocytes Absolute 1 96 Thousands/µL      Monocytes Absolute 0 55 Thousand/µL      Eosinophils Absolute 0 14 Thousand/µL      Basophils Absolute 0 01 Thousands/µL                  XR foot 2 vw left    (Results Pending)   XR foot 2 vw right    (Results Pending)              Procedures  Procedures         ED Course  ED Course as of Aug 27 0628   Thu Aug 27, 2020   0051 Able to get bilateral Doppler pulses      0125 Vascular surgery paged  Waiting for call back      0143 No callback from vascular surgery  Ordered placed for transfer to speak with vascular surgery      0213 Spoke with vascular surgery fellow, Dr Lacey Nissen, recommends transfer to [facility] and vascular team will evaluate patient there  Recommends heparin drip at this time  0248 ECG shows rate of 51, sinus, nonspecific ST and T-waves throughout, independently interpreted by me          US AUDIT      Most Recent Value   Initial Alcohol Screen: US AUDIT-C    1  How often do you have a drink containing alcohol?  0 Filed at: 08/27/2020 0053   2  How many drinks containing alcohol do you have on a typical day you are drinking? 0 Filed at: 08/27/2020 0053   3b  FEMALE Any Age, or MALE 65+:  How often do you have 4 or more drinks on one occassion? 0 Filed at: 08/27/2020 0053   Audit-C Score  0 Filed at: 08/27/2020 0053                  BEBETO/DAST-10      Most Recent Value   How many times in the past year have you    Used an illegal drug or used a prescription medication for non-medical reasons? Never Filed at: 08/27/2020 4232                                MDM      Disposition  Final diagnoses:   Bilateral cold feet     Time reflects when diagnosis was documented in both MDM as applicable and the Disposition within this note     Time User Action Codes Description Comment    8/27/2020  2:14 AM Roxana Clemons Add [R20 9] Bilateral cold feet       ED Disposition     ED Disposition Condition Date/Time Comment    Transfer to Another Facility-In Network  Thu Aug 27, 2020  2:14 AM Liza Ghotra should be transferred out to [facility]          MD Documentation      Most Recent Value   Patient Condition  The patient has been stabilized such that within reasonable medical probability, no material deterioration of the patient condition or the condition of the unborn child(lizzy) is likely to result from the transfer   Reason for Transfer  Level of Care needed not available at this facility Critical access hospital surgery]   Benefits of Transfer  Specialized equipment and/or services available at the receiving facility (Include comment)________________________ Critical access hospital surgery]   Risks of Transfer  Potential for delay in receiving treatment, Potential deterioration of medical condition, Loss of IV, Increased discomfort during transfer, Possible worsening of condition or death during transfer   Accepting Physician  Abner Wesley Name, [address] [facility] [address]   Sending MD Maria Dolores Meier   Provider Certification  General risk, such as traffic hazards, adverse weather conditions, rough terrain or turbulence, possible failure of equipment (including vehicle or aircraft), or consequences of actions of persons outside the control of the transport personnel      RN [address] Name, [address] [facility]      Follow-up Information    None         Discharge Medication List as of 8/27/2020  3:42 AM      CONTINUE these medications which have NOT CHANGED    Details   Acetaminophen (APAP) 325 MG tablet Take 650 mg by mouth every 6 (six) hours as needed for mild pain , Until Discontinued, Historical Med      artificial tear (LUBRIFRESH P M ) 83-15 % ophthalmic ointment Administer to both eyes daily at bedtime, Starting Fri 5/15/2020, No Print      calcium carbonate (OYSTER SHELL,OSCAL) 500 mg 2 TABS (1000MG) ORALLY EVERY MORNING DX: SUPPLEMENT, Normal      carboxymethylcellulose (Refresh Tears) 0 5 % SOLN Administer 1 drop to both eyes 4 (four) times a day, Historical Med      cholecalciferol (VITAMIN D3) 1,000 units tablet 1 TAB ORALLY EVERY MORNING DX: SUPPLEMENT, Normal      Cholecalciferol (VITAMIN D3) 25 MCG (1000 UT) CAPS Take 1 capsule by mouth every morning, Historical Med      lactulose (CEPHULAC) 10 G packet Take 10 g by mouth daily  , Until Discontinued, Historical Med      levETIRAcetam (KEPPRA) 1000 MG tablet 1 TAB ORALLY TWICE DAILY DX:PSYCHOSIS, Normal      magnesium hydroxide (MILK OF MAGNESIA) 400 mg/5 mL oral suspension Take 30 mL by mouth daily as needed for constipation, Historical Med      methimazole (TAPAZOLE) 10 mg tablet 1 TAB ORALLY 3 TIMES DAILY DX: HYPERTHYROIDISM **NIOSH 2 HAZARDOUS DRUG**, Normal      metoprolol tartrate (LOPRESSOR) 50 mg tablet Take 1 tablet (50 mg total) by mouth every 8 (eight) hours, Starting Mon 4/27/2020, Normal      Multiple Vitamins-Minerals (PRESERVISION AREDS PO) Take 1 capsule by mouth 2 (two) times a day , Historical Med      XARELTO 20 MG tablet 1 TAB ORALLY EVERY MORNING DX:BLOOD CLOT PREVENTION *COLD SEAL*, Normal           No discharge procedures on file      PDMP Review       Value Time User    PDMP Reviewed  Yes 5/15/2020  3:10 PM Dyllan GRAJEDA No Conrad MD          ED Provider  Electronically Signed by           Rain Musa MD  08/27/20 9785

## 2020-08-27 NOTE — ED NOTES
Report given to [facility] at [facility]        Sahara Anthony RN  08/27/20 1851

## 2020-08-27 NOTE — ASSESSMENT & PLAN NOTE
· Currently rate controlled on metoprolol  · Normally anticoagulated on Xarelto, currently holding and patient is on heparin GTT

## 2020-08-27 NOTE — ED NOTES
Unable to restart heparin drip d t pharmacy not verifying   Message sent     Rose Cano, TRINO  08/27/20 1044

## 2020-08-27 NOTE — PROGRESS NOTES
VASCULAR SURGERY FREE TEXT PROGRESS NOTE      Patient seen and examined on rounds with Dr Samy Martínez discussion had with patient's son Emilia Coleman  He states that his mom would not aggressive measure (thrombolysis/surgery)  Understands risk/benefits of procedures and of not doing anything    Continue heparin drip  Recommend palliative care consult

## 2020-08-27 NOTE — CONSULTS
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    Chronic constipation    Thyroid nodule    Hyperthyroidism    Acute cystitis with hematuria    Acute metabolic encephalopathy    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       Plan:  1  Symptom management:    Ischemic leg pain:    Start Tylenol 650 mg TID    Start Gabapentin 100 mg HS    May need very lose dose opioids  Patient at risk for delirium given age and co-morbidities, recommend global delirium precautions as follows:    - Establishment of day/night cycle via lights during the day and blinds open   Please limit interruptions at night as medically appropriate  - Patient should be out of bed during the day as tolerated or medically indicated  - Provide glasses/hearing aids as appropriate       - Minimize deliriogenic meds as able  - Provide reorientation including date on board and visible clock  - Avoid restraints as able, frequent verbal reorientations or patient care sitter as appropriate     - Consider use of melatonin qHS for circadian rhythm maintenance  2  Goals of care: Level 3, DNR/DNI  AD/LW and POA documentation in chart  Call placed to son, unfortunately no answer- will try again  Patient appropriate for hospice services if they choose to move in this direction       3  Psychosocial support: NA      History of Present Illness   Physician Requesting Consult: Love Spoon, DO  Reason for Consult / Principal Problem: goals of care  Hx and PE limited by: patient unable to provide history  HPI: Aissatou Liu is a 80y o  year old female who presents as a transfer from [address] [place] [address] for vascular surgery evaluation for ischemic leg  She has a history of afib, seizure disorder, vascular dementia with others as listed below  She presented for cold and discolored left foot  CTA was done and showed 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  Vascular discussed with her son Jason Dorman and documented POA) and decision made to not pursue aggressive measures  She has been admitted and currently on a heparin drip  She is alert but unable to provide history  She tells me she's cold but denies pain  She does grimace during examination  Inpatient consult to Palliative Care  Consult performed by: Stanley Newsome DO  Consult ordered by: Dorota Tomas PA-C          Review of Systems   Unable to perform ROS: Age       Historical Information   Past Medical History:   Diagnosis Date    A-fib [facility])     Ambulatory dysfunction     Anemia     Aphasia     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, bilateral (HCC)     Chronic fatigue     Chronic pain     Falls frequently     GERD (gastroesophageal reflux disease)     Hyperlipidemia     Hypertension     Muscle weakness     Osteoporosis     Psychiatric disorder     Seizures ([facility]     Stroke ([facility]     T6 vertebral fracture ([facility] )      Past Surgical History:   Procedure Laterality Date    MD OPEN RX FEMUR FX+INTRAMED PRISCILLA Left 7/25/2016    Procedure: INSERTION NAIL IM FEMUR ANTEGRADE (TROCHANTERIC);   Surgeon: Lawyer Joseph MD;  Location: [facility] OR;  Service: Orthopedics    WRIST FRACTURE SURGERY       E-Cigarette/Vaping    E-Cigarette Use Never User      E-Cigarette/Vaping Substances    Nicotine No     THC No     CBD No     Flavoring No     Other No     Unknown No      Social History     Socioeconomic History    Marital status:      Spouse name: None    Number of children: None    Years of education: None    Highest education level: None   Occupational History    Occupation: retired   Social Needs    Financial resource strain: None  Food insecurity     Worry: None     Inability: None    Transportation needs     Medical: None     Non-medical: None   Tobacco Use    Smoking status: Never Smoker    Smokeless tobacco: Never Used    Tobacco comment: Per [employer] Former smoker   Substance and Sexual Activity    Alcohol use: Never     Frequency: Never     Binge frequency: Never    Drug use: No    Sexual activity: None   Lifestyle    Physical activity     Days per week: None     Minutes per session: None    Stress: None   Relationships    Social connections     Talks on phone: None     Gets together: None     Attends Jewish service: None     Active member of club or organization: None     Attends meetings of clubs or organizations: None     Relationship status: None    Intimate partner violence     Fear of current or ex partner: None     Emotionally abused: None     Physically abused: None     Forced sexual activity: None   Other Topics Concern    None   Social History Narrative    Living in an assisted living facility    Per [employer]     Feels safe at home in facility  Has living will  Has dentures---sees occas        Family History   Problem Relation Age of Onset    Heart disease Mother     Heart disease Father     Dementia Sister     Mental illness Sister     Substance Abuse Neg Hx         unknown fam hx       Meds/Allergies   all current active meds have been reviewed and current meds:   Current Facility-Administered Medications   Medication Dose Route Frequency    APAP 650 mg  650 mg Oral Q6H PRN    artificial tear (LUBRIFRESH P M ) ophthalmic ointment   Both Eyes HS    calcium carbonate (OYSTER SHELL,OSCAL) 500 mg tablet 2 tablet  2 tablet Oral Daily With Breakfast    cholecalciferol (VITAMIN D3) tablet 1,000 Units  1,000 Units Oral QAM    heparin (VTE/PE) high   Intravenous Once    lactulose 20 g/30 mL oral solution 10 g  10 g Oral Daily    levETIRAcetam (KEPPRA) tablet 1,000 mg  1,000 mg Oral BID    magnesium hydroxide (MILK OF MAGNESIA) 400 mg/5 mL oral suspension 30 mL  30 mL Oral Daily PRN    methimazole (TAPAZOLE) tablet 10 mg  10 mg Oral Q8H [facility]    metoprolol tartrate (LOPRESSOR) tablet 50 mg  50 mg Oral Q8H    multivitamin-minerals (CENTRUM) tablet 1 tablet  1 tablet Oral BID    ondansetron (ZOFRAN) injection 4 mg  4 mg Intravenous Q6H PRN       Palliative Care Medications: see plan    Allergies   Allergen Reactions    Ceftriaxone     Codeine     Levofloxacin Other (See Comments)       Objective     Physical Exam  Vitals signs and nursing note reviewed  Constitutional:       General: She is not in acute distress  HENT:      Head: Normocephalic and atraumatic  Right Ear: External ear normal       Left Ear: External ear normal    Eyes:      General:         Right eye: No discharge  Left eye: No discharge  Cardiovascular:      Rate and Rhythm: Normal rate and regular rhythm  Pulmonary:      Effort: Pulmonary effort is normal  No respiratory distress  Abdominal:      General: There is no distension  Palpations: Abdomen is soft  Skin:     Coloration: Skin is pale  Comments: Left foot cool    Neurological:      General: No focal deficit present  Comments: Alert but not very conversive         Lab Results:   I have personally reviewed pertinent labs  , CBC:   Lab Results   Component Value Date    WBC 8 20 08/27/2020    HGB 14 2 08/27/2020    HCT 41 9 08/27/2020    MCV 94 08/27/2020     08/27/2020    MCH 32 0 08/27/2020    MCHC 33 9 08/27/2020    RDW 14 7 08/27/2020    MPV 10 1 08/27/2020   , CMP:   Lab Results   Component Value Date    SODIUM 140 08/27/2020    K 4 6 08/27/2020     08/27/2020    CO2 30 08/27/2020    BUN 17 08/27/2020    CREATININE 0 63 08/27/2020    CALCIUM 9 7 08/27/2020    EGFR 82 08/27/2020   , PT/PTT:  Lab Results   Component Value Date    PTT >210 (HH) 08/27/2020     Imaging Studies: I have personally reviewed pertinent reports  EKG, Pathology, and Other Studies: I have personally reviewed pertinent reports  Code Status: Level 3 - DNAR and DNI  Advance Directive and Living Will: Yes    Power of :    POLST:      Counseling / Coordination of Care  Total floor / unit time spent today 35+ minutes  Greater than 50% of total time was spent with the patient and / or family counseling and / or coordination of care  A description of the counseling / coordination of care: chart review, medication review, supportive listening, discussion with SLIM

## 2020-08-27 NOTE — ED NOTES
Pt's son updated on pt's status and portable phone provided to pt so they could talk at this time     Harsh Sapp, TRINO  08/27/20 5860

## 2020-08-27 NOTE — ASSESSMENT & PLAN NOTE
· Presented to [address] with cold left foot, concern noted for possible ischemia of leg the patient and started on heparin GTT and transferred to this hospital for vascular surgery evaluation    Remains with palpable pulses and otherwise hemodynamically stable  · Appreciate early vascular recommendations, CTA runoff is to be obtained along with LEADs  · Will continue heparin GTT for now  · Keep NPO pending final vascular surgery recommendations  · Neurovascular checks

## 2020-08-28 PROBLEM — Z71.89 GOALS OF CARE, COUNSELING/DISCUSSION: Status: ACTIVE | Noted: 2020-08-28

## 2020-08-28 LAB
APTT PPP: 201 SECONDS (ref 23–37)
APTT PPP: >210 SECONDS (ref 23–37)

## 2020-08-28 PROCEDURE — 99231 SBSQ HOSP IP/OBS SF/LOW 25: CPT | Performed by: PHYSICIAN ASSISTANT

## 2020-08-28 PROCEDURE — 85730 THROMBOPLASTIN TIME PARTIAL: CPT | Performed by: INTERNAL MEDICINE

## 2020-08-28 PROCEDURE — 85730 THROMBOPLASTIN TIME PARTIAL: CPT | Performed by: FAMILY MEDICINE

## 2020-08-28 PROCEDURE — 99233 SBSQ HOSP IP/OBS HIGH 50: CPT | Performed by: INTERNAL MEDICINE

## 2020-08-28 RX ORDER — HYDROMORPHONE HCL/PF 1 MG/ML
0.1 SYRINGE (ML) INJECTION EVERY 2 HOUR PRN
Status: DISCONTINUED | OUTPATIENT
Start: 2020-08-28 | End: 2020-08-29

## 2020-08-28 RX ORDER — BISACODYL 10 MG
10 SUPPOSITORY, RECTAL RECTAL DAILY PRN
Status: DISCONTINUED | OUTPATIENT
Start: 2020-08-28 | End: 2020-08-29

## 2020-08-28 RX ORDER — LORAZEPAM 2 MG/ML
0.5 INJECTION INTRAMUSCULAR
Status: DISCONTINUED | OUTPATIENT
Start: 2020-08-28 | End: 2020-08-29

## 2020-08-28 RX ORDER — HALOPERIDOL 5 MG/ML
0.5 INJECTION INTRAMUSCULAR
Status: DISCONTINUED | OUTPATIENT
Start: 2020-08-28 | End: 2020-08-29

## 2020-08-28 RX ADMIN — WHITE PETROLATUM 57.7 %-MINERAL OIL 31.9 % EYE OINTMENT 1 APPLICATION: at 21:01

## 2020-08-28 RX ADMIN — METOPROLOL TARTRATE 2.5 MG: 5 INJECTION, SOLUTION INTRAVENOUS at 09:31

## 2020-08-28 RX ADMIN — METOPROLOL TARTRATE 2.5 MG: 5 INJECTION, SOLUTION INTRAVENOUS at 05:28

## 2020-08-28 RX ADMIN — LEVETIRACETAM 1000 MG: 100 INJECTION, SOLUTION INTRAVENOUS at 20:15

## 2020-08-28 RX ADMIN — LEVETIRACETAM 1000 MG: 100 INJECTION, SOLUTION INTRAVENOUS at 09:31

## 2020-08-28 NOTE — ASSESSMENT & PLAN NOTE
· Was living in [facility] facility prior to admission per son  · Fall and delirium precautions  · Supportive care

## 2020-08-28 NOTE — ASSESSMENT & PLAN NOTE
· Patient presented to [facility] complaining of b/l foot pain, left foot discoloration and b/l LE cool to touch per ED records  There was concern for possible ischemia of leg and she was started on Heparin GTT and transferred to [facility] for Vascular Surgery evaluation  · Vascular Surgery input appreciated  Per Vascular - CTA demonstrated 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  Per record review, Vascular discussed with the patient's son, who did not wish for any invasive procedures    · Palliative Care and Hospice now following and heparin GTT was discontinued   Pain control

## 2020-08-28 NOTE — ED ATTENDING ATTESTATION
8/27/2020  I, Mathew Clark MD, saw and evaluated the patient  I have discussed the patient with the resident/non-physician practitioner and agree with the resident's/non-physician practitioner's findings, Plan of Care, and MDM as documented in the resident's/non-physician practitioner's note, except where noted  All available labs and Radiology studies were reviewed  I was present for key portions of any procedure(s) performed by the resident/non-physician practitioner and I was immediately available to provide assistance  At this point I agree with the current assessment done in the Emergency Department  I have conducted an independent evaluation of this patient a history and physical is as follows:      OA: Pt transferred from [facility] with concern for left foot pain, cool to touch and discoloration  Currently on heparin drip  Pending vascular consult in the ED  Pt is a poor historian with a history of vascular dementia  Currently on blood thinners  NO other report of cp/sob/fevers/chills/falls/trauma  PE, NAD, VSS, NC/AT, MMM, RR, lungs CTAB, abd soft, + doppler distal pulses, cool, discolored left foot  A/p discuss with vascular  Pt already with baseline labs and heparin drip         ED Course         Critical Care Time  Procedures

## 2020-08-28 NOTE — ASSESSMENT & PLAN NOTE
· Appreciate input from palliative care   · Patient today stated "just let me go" and expressed her readiness to die   · I breached hospice conversation with son who is overwhelmed and unsure how she would be able to pursue this at her ILF - does not want her in nursing home   · Continue to address [address] and involve CM

## 2020-08-28 NOTE — PROGRESS NOTES
Patient's PTT at 201 this morning  Provider notified who instructed RN to hold Heparin for 6 hours then re-check PTT  Continue to monitor patient

## 2020-08-28 NOTE — PROGRESS NOTES
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  Assessment & Plan  · Presented to - with cold left foot, concern for possible ischemia of leg and started on heparin GTT and transferred to [facility] for vascular surgery evaluation  · Vascular surgery input appreciated, upon their review CTA demonstrated 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  · Vascular surgery d/w patient son, who does not wish for any invasive intervention at this time  · Opt for medical management  Continue heparin gtt at this time      · Son understands risk of limb loss/amputation   · Appreciate palliative care  · PRN pain control   · Neurovascular checks    Goals of care, counseling/discussion  Assessment & Plan  · Appreciate input from palliative care   · Patient today stated "just let me go" and expressed her readiness to die   · I breached hospice conversation with son who is overwhelmed and unsure how she would be able to pursue this at her ILF - does not want her in nursing home   · Continue to address [address] and involve CM     Hyperthyroidism  Assessment & Plan  · Continue methimazole however not taking PO     Vascular dementia without behavioral disturbance ([facility] [address] )  Assessment & Plan  · Lives in Independent living facility   · Fall and delirium precautions    Essential hypertension  Assessment & Plan  · Changed PO lopressor to IV w/ hold parameters     Paroxysmal atrial fibrillation ([facility] [address] )  Assessment & Plan  · Currently rate controlled on metoprolol  · Normally anticoagulated on Xarelto, currently holding and patient is on heparin GTT    [facility]  Assessment & Plan  · No recent reported seizures  · Continue Keppra IV     VTE Pharmacologic Prophylaxis:   Pharmacologic: Heparin Drip  Mechanical VTE Prophylaxis in Place: Yes    Patient Centered Rounds: I have performed bedside rounds with nursing staff today  Discussions with Specialists or Other Care Team Provider: nursing - TT palliative     Education and Discussions with Family / Patient: patient - called son     Time Spent for Care: 30 minutes  More than 50% of total time spent on counseling and coordination of care as described above  Current Length of Stay: 1 day(s)    Current Patient Status: Inpatient   Certification Statement: The patient will continue to require additional inpatient hospital stay due to pending safe d/c planning     Discharge Plan: pending clinical course     Code Status: Level 3 - DNAR and DNI      Subjective:   Pt seen and examined at bedside  States "just let me go "     Objective:     Vitals:   Temp (24hrs), Av 5 °F (36 4 °C), Min:96 8 °F (36 °C), Max:98 5 °F (36 9 °C)    Temp:  [96 8 °F (36 °C)-98 5 °F (36 9 °C)] 98 5 °F (36 9 °C)  HR:  [48-76] 71  Resp:  [12-20] 18  BP: (117-133)/(57-67) 120/62  SpO2:  [94 %-98 %] 94 %  Body mass index is 23 73 kg/m²  Input and Output Summary (last 24 hours): Intake/Output Summary (Last 24 hours) at 2020 1041  Last data filed at 2020 1741  Gross per 24 hour   Intake 0 ml   Output    Net 0 ml     Physical Exam:     Physical Exam  Constitutional:       Appearance: Normal appearance  She is ill-appearing  HENT:      Head: Normocephalic and atraumatic  Mouth/Throat:      Mouth: Mucous membranes are dry  Neck:      Musculoskeletal: Normal range of motion and neck supple  Cardiovascular:      Rate and Rhythm: Normal rate and regular rhythm  Pulmonary:      Effort: Pulmonary effort is normal       Breath sounds: Normal breath sounds  Abdominal:      General: Abdomen is flat  Palpations: Abdomen is soft  Musculoskeletal: Normal range of motion  General: No swelling  Skin:     General: Skin is dry  Neurological:      Mental Status: She is alert  Comments: arousable and oriented but falls back asleep    Psychiatric:      Comments: Lethargic        Additional Data:     Labs:    Results from last 7 days   Lab Units 08/27/20  0113   WBC Thousand/uL 8 20   HEMOGLOBIN g/dL 14 2   HEMATOCRIT % 41 9   PLATELETS Thousands/uL 199   NEUTROS PCT % 67   LYMPHS PCT % 24   MONOS PCT % 7   EOS PCT % 2     Results from last 7 days   Lab Units 08/27/20  0113   SODIUM mmol/L 140   POTASSIUM mmol/L 4 6   CHLORIDE mmol/L 104   CO2 mmol/L 30   BUN mg/dL 17   CREATININE mg/dL 0 63   ANION GAP mmol/L 6   CALCIUM mg/dL 9 7   GLUCOSE RANDOM mg/dL 84     Results from last 7 days   Lab Units 08/27/20  0113   INR  1 11             Results from last 7 days   Lab Units 08/27/20  0113   LACTIC ACID mmol/L 1 5     * I Have Reviewed All Lab Data Listed Above  * Additional Pertinent Lab Tests Reviewed:  Brayden Bee Admission Reviewed    Imaging:    Imaging Reports Reviewed Today Include: all  Imaging Personally Reviewed by Myself Includes:  none    Recent Cultures (last 7 days):           Last 24 Hours Medication List:   Current Facility-Administered Medications   Medication Dose Route Frequency Provider Last Rate    acetaminophen  650 mg Oral [facility] Arleta Nones, DO      artificial tear   Both Eyes HS Evlyn Salle, DO      calcium carbonate  2 tablet Oral Daily With Breakfast Evlyn Salle, DO      cholecalciferol  1,000 Units Oral QAM Evlyn Salle, DO      gabapentin  100 mg Oral HS Shayy M Bendas, DO      heparin (porcine)  3-30 Units/kg/hr (Order-Specific) Intravenous Titrated Rajani Jennings PA-C Stopped (08/28/20 0845)    heparin (porcine)  2,600 Units Intravenous Q1H PRN Rajani Michaela PA-KAROLINE      heparin (porcine)  5,200 Units Intravenous Q1H PRN Rajani Jennings PA-C      lactulose  10 g Oral Daily Evlyn Salle, DO      levETIRAcetam  1,000 mg Intravenous Q12H [address] RajaniCHANNING New-C 1,000 mg (08/28/20 0931)    magnesium hydroxide  30 mL Oral Daily PRN Fabricio Neff, DO      methimazole  10 mg Oral [facility] Fabricio Neff, DO      metoprolol  2 5 mg Intravenous Q6H Mike Serrano PA-C      multivitamin-minerals  1 tablet Oral BID Fabricio Neff, DO      ondansetron  4 mg Intravenous Q6H PRN Fabricio Neff DO          Today, Patient Was Seen By: Mike Serrano PA-C    ** Please Note: Dictation voice to text software may have been used in the creation of this document   **

## 2020-08-28 NOTE — PROGRESS NOTES
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    Chronic constipation    Thyroid nodule    Hyperthyroidism    Acute cystitis with hematuria    Acute metabolic encephalopathy    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    Goals of care, counseling/discussion     PLAN:    1  Goals:    Discussed goals of care w/ son Maricruz Caballero today via phone  He recognizes that his mother is not doing well, has a poor prognosis and has been "suffering for years"  He supports the idea of hospice   Patient's main verbalization today was that she is ready "to go upstairs" (indicating heaven)   As patient is lethargic, not tolerating PO food or medications, she is likely IPU appropriate  Disposition can be decided w/ CM, Hospice, family  o Son would prefer she not return to her prior SNF if at all possible  2  Social Support:   Supportive listening provided   Normalized experience of patient/family   Provided anxiety containment   Advocated for patient/family with interdisciplinary team    3  Symptom management:   Comfort orders placed, including orders for analgesia, anxiousness, EOL agitation   Recommend global delirium precautions including:   o Establishment of day/night cycle via lights during the day and blinds open  Please limit interruptions at night as medically appropriate  o Provide glasses/hearing aids as apprioriate     o Minimize deliriogenic meds as able  o Provide reorientation including date on board and visible clock  o Avoid restraints as able, frequent verbal reorientations or patient care sitter as appropriate      Code status: Level 4 - Comfort Care   Decisional apparatus:  Patient does not have capacity to make medical decisions on my exam today  If such capacity is lost, patient's substitute decision maker would default to adult son Angela Heard by [place] Act 169  We appreciate the opportunity to participate in this patient's care  We will continue to follow  Please do not hesitate to contact our on-call provider through our clinic answering service at [phone] should you have acute symptom control concerns  INTERVAL HISTORY:    No acute events overnight  [facility] contacted by [facility] as family had indicated they were comfortable w/ hospice  Patient denies major symptoms but is clearly lethargic  She states "I'm ready to go     to go upstairs"  Review of Systems   Unable to perform ROS: Other (Lethargy)     MEDICATIONS / ALLERGIES:  all current active meds have been reviewed and current meds:   Current Facility-Administered Medications   Medication Dose Route Frequency    artificial tear (LUBRIFRESH P M ) ophthalmic ointment   Both Eyes HS    bisacodyl (DULCOLAX) rectal suppository 10 mg  10 mg Rectal Daily PRN    haloperidol lactate (HALDOL) injection 0 5 mg  0 5 mg Intravenous Q1H PRN    HYDROmorphone (DILAUDID) injection 0 1 mg  0 1 mg Intravenous Q2H PRN    levETIRAcetam (KEPPRA) 1,000 mg in sodium chloride 0 9 % 100 mL IVPB  1,000 mg Intravenous Q12H OKSANA    LORazepam (ATIVAN) injection 0 5 mg  0 5 mg Intravenous Q1H PRN    ondansetron (ZOFRAN) injection 4 mg  4 mg Intravenous Q6H PRN       Allergies   Allergen Reactions    Ceftriaxone     Codeine     Levofloxacin Other (See Comments)       OBJECTIVE:  /62   Pulse 71   Temp 98 5 °F (36 9 °C)   Resp 18   Ht 5' 6" (1 676 m)   Wt 66 7 kg (147 lb)   LMP  (LMP Unknown)   SpO2 94%   BMI 23 73 kg/m²   Nursing notes and vital signs reviewed  Physical Exam:  Constitutional: Lethargic, chronically ill-appearing, debilitated elderly female supine in hospital bed  In no acute physical distress  Dysphoric mood    Head: Normocephalic and atraumatic  Eyes: EOM are normal  No ocular discharge  No scleral icterus  Neck: No visible adenopathy or masses  Respiratory: Effort normal  No stridor  No respiratory distress  Gastrointestinal: No abdominal distension  Musculoskeletal: No edema  Left foot is cool  Neurological: Sleeping but wakes to vocal stimuli  Lethargic  Confused  No focal deficits  Skin: Dry, no diaphoresis  Pallor noted  Psychiatric: Dysphoric mood  Withdrawn  No agitation noted  Lab Results: I have personally reviewed pertinent labs  PT/PTT:  Lab Results   Component Value Date     (HH) 08/28/2020     Albumin:  0   Lab Value Date/Time    ALB 2 8 (L) 05/13/2020 0535    ALB 3 5 01/07/2016 0900     Imaging Studies: I have personally reviewed pertinent reports  EKG, Pathology, and Other Studies: I have personally reviewed pertinent reports  Counseling / Coordination of Care: Total floor / unit time spent today 40+ minutes including phone call to patient's son Sully Worthington from 11:00-11:10h  Greater than 50% of total time was spent with the patient and / or family counseling and / or coordination of care  A description of the counseling / coordination of care: symptom assessment and management, medication review and adjustment, psychosocial support, chart review, imaging review, goals of care, hospice services, comfort care, advanced directives, coordination w/ [facility]CM  Akua Last MD  [facility] Palliative and Supportive Care      Portions of this document may have been created using dictation software and as such some "sound alike" terms may have been generated by the system  Do not hesitate to contact me with any questions or clarifications

## 2020-08-28 NOTE — SOCIAL WORK
CM was consulted for IP hospice referral for pt  CM called pt's son/POA Emilia Coleman- [phone] to discuss the above  Ovidio Lopez informed CM that the medical team discussed hospice with him  A post acute care recommendation was made by your care team for IP Hospice  Discussed [facility] with POA  List of facilities given to POA via in phone  POA aware the list is custom filtered for them by preferred and that [facility] post acute providers are designated  Ovidio Lopez requested referral to [facility], referral in [facility]  CM called and informed ROSHAN Manuel hospice liaison of the above  CM will follow

## 2020-08-28 NOTE — ASSESSMENT & PLAN NOTE
· Presented to [facility] with cold left foot, concern for possible ischemia of leg and started on heparin GTT and transferred to B for vascular surgery evaluation  · Vascular surgery input appreciated, upon their review CTA demonstrated 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  · Vascular surgery d/w patient son, who does not wish for any invasive intervention at this time  · Opt for medical management  Continue heparin gtt at this time      · Son understands risk of limb loss/amputation   · Appreciate palliative care  · PRN pain control   · Neurovascular checks

## 2020-08-28 NOTE — SOCIAL WORK
CM received a call from [address] [place] [address] that they can accept pt at [facility] but no bed availability at this time  CM called and informed pt's son Uvaldo Gonzalez of the above

## 2020-08-28 NOTE — HOSPICE NOTE
Hospice referral received, pt approved for inpatient unit by Dr Akanksha Crespo, no inpatient beds currently available  Called son Khushboo Chaves and left  requesting call back to discuss IPU and to make arrangements for consents for when bed becomes available  PROMISE Mayorga aware  Will have weekend liaison follow up with son and CM

## 2020-08-28 NOTE — ASSESSMENT & PLAN NOTE
· Palliative Care and Hospice following, appreciate their recommendations  · Per record review, patient was approved for IPU on 8/28/2020, however patient apparently more alert today compared to prior and not using IV pain medications  Per Hospice, patient no longer qualifies for IPU   Palliative added PO comfort meds

## 2020-08-29 PROCEDURE — 99232 SBSQ HOSP IP/OBS MODERATE 35: CPT | Performed by: PHYSICIAN ASSISTANT

## 2020-08-29 PROCEDURE — 99233 SBSQ HOSP IP/OBS HIGH 50: CPT | Performed by: INTERNAL MEDICINE

## 2020-08-29 RX ORDER — HYDROMORPHONE HCL/PF 1 MG/ML
0.1 SYRINGE (ML) INJECTION EVERY 2 HOUR PRN
Status: DISCONTINUED | OUTPATIENT
Start: 2020-08-29 | End: 2020-08-31 | Stop reason: HOSPADM

## 2020-08-29 RX ORDER — LORAZEPAM 2 MG/ML
0.5 INJECTION INTRAMUSCULAR EVERY 4 HOURS PRN
Status: DISCONTINUED | OUTPATIENT
Start: 2020-08-29 | End: 2020-08-31 | Stop reason: HOSPADM

## 2020-08-29 RX ORDER — LORAZEPAM 0.5 MG/1
0.5 TABLET ORAL EVERY 4 HOURS PRN
Status: DISCONTINUED | OUTPATIENT
Start: 2020-08-29 | End: 2020-08-31 | Stop reason: HOSPADM

## 2020-08-29 RX ORDER — OXYCODONE HYDROCHLORIDE 5 MG/1
2.5 TABLET ORAL EVERY 4 HOURS PRN
Status: DISCONTINUED | OUTPATIENT
Start: 2020-08-29 | End: 2020-08-31 | Stop reason: HOSPADM

## 2020-08-29 RX ORDER — ACETAMINOPHEN 325 MG/1
650 TABLET ORAL EVERY 6 HOURS PRN
Status: DISCONTINUED | OUTPATIENT
Start: 2020-08-29 | End: 2020-08-31 | Stop reason: HOSPADM

## 2020-08-29 RX ORDER — POLYETHYLENE GLYCOL 3350 17 G/17G
17 POWDER, FOR SOLUTION ORAL DAILY
Status: DISCONTINUED | OUTPATIENT
Start: 2020-08-29 | End: 2020-08-31 | Stop reason: HOSPADM

## 2020-08-29 RX ORDER — SENNOSIDES 8.6 MG
1 TABLET ORAL 2 TIMES DAILY PRN
Status: DISCONTINUED | OUTPATIENT
Start: 2020-08-29 | End: 2020-08-31 | Stop reason: HOSPADM

## 2020-08-29 RX ORDER — HALOPERIDOL 0.5 MG/1
0.5 TABLET ORAL EVERY 4 HOURS PRN
Status: DISCONTINUED | OUTPATIENT
Start: 2020-08-29 | End: 2020-08-31 | Stop reason: HOSPADM

## 2020-08-29 RX ORDER — HALOPERIDOL 5 MG/ML
0.5 INJECTION INTRAMUSCULAR EVERY 4 HOURS PRN
Status: DISCONTINUED | OUTPATIENT
Start: 2020-08-29 | End: 2020-08-31 | Stop reason: HOSPADM

## 2020-08-29 RX ORDER — BISACODYL 10 MG
10 SUPPOSITORY, RECTAL RECTAL DAILY PRN
Status: DISCONTINUED | OUTPATIENT
Start: 2020-08-29 | End: 2020-08-31 | Stop reason: HOSPADM

## 2020-08-29 RX ORDER — OXYCODONE HYDROCHLORIDE 5 MG/1
5 TABLET ORAL EVERY 4 HOURS PRN
Status: DISCONTINUED | OUTPATIENT
Start: 2020-08-29 | End: 2020-08-31 | Stop reason: HOSPADM

## 2020-08-29 RX ADMIN — LEVETIRACETAM 1000 MG: 100 INJECTION, SOLUTION INTRAVENOUS at 20:21

## 2020-08-29 RX ADMIN — LEVETIRACETAM 1000 MG: 100 INJECTION, SOLUTION INTRAVENOUS at 09:57

## 2020-08-29 RX ADMIN — HYDROMORPHONE HYDROCHLORIDE 0.1 MG: 1 INJECTION, SOLUTION INTRAMUSCULAR; INTRAVENOUS; SUBCUTANEOUS at 12:22

## 2020-08-29 RX ADMIN — WHITE PETROLATUM 57.7 %-MINERAL OIL 31.9 % EYE OINTMENT: at 20:29

## 2020-08-29 NOTE — CASE MANAGEMENT
Informed by Anamaria Majano liaison that the patient does not meet criteria for inpatient hospice admission today and she reviewed case with Dr Baljinder Pruett  Hospice liaison will assess patient again tomorrow  Family informed Isabella Avery they would like referral to [facility] as backup  [facility] referral was made

## 2020-08-29 NOTE — QUICK NOTE
[facility] contacted by [facility] Liaison as patient is more awake today, and is tolerating PO  Adjusting comfort medications to allow for PO administration      Nba Burt MD  [facility]  and Supportive Care  [phone]

## 2020-08-29 NOTE — HOSPICE NOTE
Came and saw the patient  While she is confused she is more awake that yesterday   has had no IV med's since yesterday and was able to eat some breakfast  Met son at bedside and reviewed the 3 levels of hospice care  While there talking with son patient did say her toe hurt  Nurse was going to give her pain med  I spoke with Dr Nick Coello who agrees with me that right now patient is not meeting inpatient criteria  I explained all of this to the son and he fully understands  I also asked Dr Yemi Chan to order PO med's for the patient to see how she would tolerate them  Son is also interested in [place] for his mother   Consents are signed either way OOH DNR in her paper chart

## 2020-08-29 NOTE — PROGRESS NOTES
Progress Note - Ric Nicholas 1935, 80 y o  female MRN: [id number]    Unit/Bed#: -01 Encounter: [id number]    Primary Care Provider: Rosendo Alvarado MD   Date and time admitted to hospital: 8/27/2020  4:02 AM        * Suspected ischemic leg  Assessment & Plan  · Patient presented to [facility] complaining of b/l foot pain, left foot discoloration and b/l LE cool to touch per ED records  There was concern for possible ischemia of leg and she was started on Heparin GTT and transferred to [facility] for Vascular Surgery evaluation  · Vascular Surgery input appreciated  Per Vascular - CTA demonstrated 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  Per record review, Vascular discussed with the patient's son, who did not wish for any invasive procedures    · Palliative Care and Hospice now following and heparin GTT was discontinued  Pain control     Goals of care, counseling/discussion  Assessment & Plan  · Palliative Care and Hospice following, appreciate their recommendations  · Per record review, patient was approved for IPU on 8/28/2020, however patient apparently more alert today compared to prior and not using IV pain medications  Per Hospice, patient no longer qualifies for IPU  Palliative added PO comfort meds    Vascular dementia without behavioral disturbance ([facility] [address] )  Assessment & Plan  · Was living in [address] [facility] living facility prior to admission per son  · Fall and delirium precautions  · Supportive care     Paroxysmal atrial fibrillation [facility])  Assessment & Plan  · Currently rate controlled on metoprolol  · Normally anticoagulated on Xarelto   Now off AC as patient is comfort care    Seizure disorder [facility])  Assessment & Plan  · No recent reported seizures  · Continue Keppra IV     Hyperthyroidism  Assessment & Plan  · Off methimazole now that patient is comfort care     Essential hypertension  Assessment & Plan  · Patient is comfort care      VTE Pharmacologic Prophylaxis:   Pharmacologic: Pharmacologic VTE Prophylaxis contraindicated due to comfort care  Mechanical VTE Prophylaxis in Place: No    Patient Centered Rounds: I have performed bedside rounds with nursing staff today  Billie Joseph    Discussions with Specialists or Other Care Team Provider: Hospice liaison and      Education and Discussions with Family / Patient: patient and son     Time Spent for Care: 30 minutes  More than 50% of total time spent on counseling and coordination of care as described above  Current Length of Stay: 2 day(s)    Current Patient Status: Inpatient   Certification Statement: The patient will continue to require additional inpatient hospital stay due to no longer qualifies for [address] per liaison    Discharge Plan: no longer qualifies for IPU per hospice, Palliative Care adjusting meds, hospice to re-evaluate tomorrow    Code Status: Level 4 - Comfort Care      Subjective:   Ms Irene Kaufman reported some toe pain  Objective:     Vitals:   Temp (24hrs), Av °F (37 2 °C), Min:98 6 °F (37 °C), Max:99 5 °F (37 5 °C)    Temp:  [98 6 °F (37 °C)-99 5 °F (37 5 °C)] 98 9 °F (37 2 °C)  HR:  [78-96] 89  Resp:  [18] 18  BP: (121-122)/(64-66) 121/66  SpO2:  [94 %-95 %] 95 %  Body mass index is 23 73 kg/m²  Input and Output Summary (last 24 hours): Intake/Output Summary (Last 24 hours) at 2020 1446  Last data filed at 2020 0900  Gross per 24 hour   Intake 0 ml   Output 1469 ml   Net -1469 ml       Physical Exam:     Physical Exam  Vitals signs and nursing note reviewed  Constitutional:       Comments: Patient seen lying in bed with nurse present  Cardiovascular:      Rate and Rhythm: Normal rate and regular rhythm  Pulmonary:      Effort: Pulmonary effort is normal  No respiratory distress  Breath sounds: Normal breath sounds  No wheezing  Abdominal:      General: Bowel sounds are normal       Palpations: Abdomen is soft  Tenderness: There is no abdominal tenderness  Musculoskeletal:      Right lower leg: No edema  Left lower leg: No edema  Skin:     Comments: Discolored left foot   Neurological:      Mental Status: She is alert  Comments: Oriented to person and hospital but not year   Psychiatric:      Comments: Restless appearing           Additional Data:     Labs:    Results from last 7 days   Lab Units 08/27/20  0113   WBC Thousand/uL 8 20   HEMOGLOBIN g/dL 14 2   HEMATOCRIT % 41 9   PLATELETS Thousands/uL 199   NEUTROS PCT % 67   LYMPHS PCT % 24   MONOS PCT % 7   EOS PCT % 2     Results from last 7 days   Lab Units 08/27/20  0113   SODIUM mmol/L 140   POTASSIUM mmol/L 4 6   CHLORIDE mmol/L 104   CO2 mmol/L 30   BUN mg/dL 17   CREATININE mg/dL 0 63   ANION GAP mmol/L 6   CALCIUM mg/dL 9 7   GLUCOSE RANDOM mg/dL 84     Results from last 7 days   Lab Units 08/27/20  0113   INR  1 11             Results from last 7 days   Lab Units 08/27/20  0113   LACTIC ACID mmol/L 1 5           * I Have Reviewed All Lab Data Listed Above  * Additional Pertinent Lab Tests Reviewed:  All Labs Within Last 24 Hours Reviewed    Imaging:    Imaging Reports Reviewed Today Include: as above  Imaging Personally Reviewed by Myself Includes:  none    Recent Cultures (last 7 days):           Last 24 Hours Medication List:   Current Facility-Administered Medications   Medication Dose Route Frequency Provider Last Rate    acetaminophen  650 mg Oral Q6H PRN Nba Burt MD      artificial tear   Both Eyes HS Sathya Dux, DO      bisacodyl  10 mg Rectal Daily PRN Nba Burt MD      haloperidol  0 5 mg Oral Q4H PRN Nba Burt MD      haloperidol lactate  0 5 mg Intravenous Q4H PRN Nba Burt MD      HYDROmorphone  0 1 mg Intravenous Q2H PRN Nba Burt MD      levETIRAcetam  1,000 mg Intravenous Q12H [address] Rajani Jennings PA-C 1,000 mg (08/29/20 0957)    LORazepam  0 5 mg Intravenous Q4H PRN Nba Burt MD      LORazepam  0 5 mg Oral Q4H PRN Ketty Garcia Stephanie Matamoros MD      ondansetron  4 mg Intravenous Q6H PRN Consuelo Watkins DO      oxyCODONE  2 5 mg Oral Q4H PRN Ankur Newberry MD      Or   [facility] oxyCODONE  5 mg Oral Q4H PRN Ankur Newberry MD      polyethylene glycol  17 g Oral Daily Ankur Newberry MD      senna  1 tablet Oral BID PRN Ankur Newberry MD          Today, Patient Was Seen By: Lily Glass PA-C    ** Please Note: Dictation voice to text software may have been used in the creation of this document   **

## 2020-08-29 NOTE — HOSPICE NOTE
There is a bed at the hospice house today  Reached out to patients son Suzette Wheeler  He will be here in about an hour to sign consents

## 2020-08-29 NOTE — ASSESSMENT & PLAN NOTE
· Currently rate controlled on metoprolol  · Normally anticoagulated on Xarelto   Now off [facility] as patient is comfort care

## 2020-08-29 NOTE — PROGRESS NOTES
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    Chronic constipation    Thyroid nodule    Hyperthyroidism    Acute cystitis with hematuria    Acute metabolic encephalopathy    Suspected ischemic leg    Goals of care, counseling/discussion     PLAN:    1  Goals:    Continue comfort cares until patient can safely transition to hospice  Likely in SNF setting, unless she declines again and can no longer tolerate PO (in which case she may become IPU appropriate)  2  Social Support:   Supportive listening provided   Normalized experience of patient/family   Provided anxiety containment   Advocated for patient/family with interdisciplinary team    3  Symptom management:   As patient is more awake today (though still lethargic) and tolerating PO, adjusted comfort medications to allow for PO administration  Retained IV mediations should PO not be an option  Medications focused on analgesia, anxiety, agitation, constipation  · Recommend global delirium precautions including:   ? Establishment of day/night cycle via lights during the day and blinds open  Please limit interruptions at night as medically appropriate  ? Provide glasses/hearing aids as apprioriate  ? Minimize deliriogenic meds as able  ? Provide reorientation including date on board and visible clock  ? Avoid restraints as able, frequent verbal reorientations or patient care sitter as appropriate  Code status: Level 4 - Comfort Care   Decisional apparatus:  Patient does not have capacity to make medical decisions on my exam today   If such capacity is lost, patient's substitute decision maker would default to adult son Enid Arizmendi by [place] Act 169  We appreciate the opportunity to participate in this patient's care  We will continue to follow  Please do not hesitate to contact our on-call provider through our clinic answering service at [phone] should you have acute symptom control concerns  INTERVAL HISTORY:    Patient is more awake today and is tolerating pleasure feeds  Son is at bedside and is happy to have the opportunity to converse with his mother  Patient remains lethargic but attempts to answer some questions  She endorses leg pain (she had just received pain medication prior to this provider's visit and it may not have yet taken effect)      MEDICATIONS / ALLERGIES:  all current active meds have been reviewed and current meds:   Current Facility-Administered Medications   Medication Dose Route Frequency    acetaminophen (TYLENOL) tablet 650 mg  650 mg Oral Q6H PRN    artificial tear (LUBRIFRESH P M ) ophthalmic ointment   Both Eyes HS    bisacodyl (DULCOLAX) rectal suppository 10 mg  10 mg Rectal Daily PRN    haloperidol (HALDOL) tablet 0 5 mg  0 5 mg Oral Q4H PRN    haloperidol lactate (HALDOL) injection 0 5 mg  0 5 mg Intravenous Q4H PRN    HYDROmorphone (DILAUDID) injection 0 1 mg  0 1 mg Intravenous Q2H PRN    levETIRAcetam (KEPPRA) 1,000 mg in sodium chloride 0 9 % 100 mL IVPB  1,000 mg Intravenous Q12H [facility]    LORazepam (ATIVAN) injection 0 5 mg  0 5 mg Intravenous Q4H PRN    LORazepam (ATIVAN) tablet 0 5 mg  0 5 mg Oral Q4H PRN    ondansetron (ZOFRAN) injection 4 mg  4 mg Intravenous Q6H PRN    oxyCODONE (ROXICODONE) IR tablet 2 5 mg  2 5 mg Oral Q4H PRN    Or    oxyCODONE (ROXICODONE) IR tablet 5 mg  5 mg Oral Q4H PRN    polyethylene glycol (MIRALAX) packet 17 g  17 g Oral Daily    senna (SENOKOT) tablet 8 6 mg  1 tablet Oral BID PRN       Allergies   Allergen Reactions    Ceftriaxone     Codeine     Levofloxacin Other (See Comments)       OBJECTIVE:  /66   Pulse 89   Temp 98 9 °F (37 2 °C) (Oral)   Resp 18   Ht 5' 6" (1 676 m)   Wt 66 7 kg (147 lb)   LMP  (LMP Unknown)   SpO2 95%   BMI 23 73 kg/m²   Nursing notes and vital signs reviewed  Physical Exam:  Constitutional: Lethargic, chronically ill-appearing, frail and debilitated elderly female  In discomfort  Dysphoric mood  Head: Normocephalic and atraumatic  Eyes: EOM are normal  No ocular discharge  No scleral icterus  Neck: No visible adenopathy or masses  Respiratory: Effort normal  No stridor  No respiratory distress  Gastrointestinal: No abdominal distension  Musculoskeletal: No edema  Neurological: Awake but lethargic  Able to answer some simple questions appropriately  Able to follow simple commands  No focal deficit  Skin: Dry, no diaphoresis  Pallor noted  Psychiatric: Dysphoric mood  No agitation noted  Lab Results: I have personally reviewed pertinent labs  Imaging Studies: I have personally reviewed pertinent reports  EKG, Pathology, and Other Studies: I have personally reviewed pertinent reports  Counseling / Coordination of Care: Total floor / unit time spent today 25+ minutes  Greater than 50% of total time was spent with the patient and / or family counseling and / or coordination of care  A description of the counseling / coordination of care: symptom assessment and management, medication review and adjustment, psychosocial support, chart review, imaging review, lab review, goals of care, disposition  Kortney Ann MD  [facility] Palliative and Supportive Care  [phone]    Portions of this document may have been created using dictation software and as such some "sound alike" terms may have been generated by the system  Do not hesitate to contact me with any questions or clarifications

## 2020-08-30 PROCEDURE — 99232 SBSQ HOSP IP/OBS MODERATE 35: CPT | Performed by: PHYSICIAN ASSISTANT

## 2020-08-30 RX ADMIN — WHITE PETROLATUM 57.7 %-MINERAL OIL 31.9 % EYE OINTMENT: at 20:51

## 2020-08-30 RX ADMIN — LEVETIRACETAM 1000 MG: 100 INJECTION, SOLUTION INTRAVENOUS at 09:22

## 2020-08-30 RX ADMIN — LEVETIRACETAM 1000 MG: 100 INJECTION, SOLUTION INTRAVENOUS at 20:47

## 2020-08-30 NOTE — HOSPICE NOTE
Pt assessed, able to take sips and some PO without any difficulties  Pt is not inpatient appropriate, she will need placement for routine level of care on hospice  PROMISE Mcbride aware  Called son Eden Bell and he is aware  He states patient ran out of money awhile ago  Eden Bell aware CM will be contacting him regarding SNF placement

## 2020-08-30 NOTE — ASSESSMENT & PLAN NOTE
· Was living in [address] [facility] facility prior to admission per son  · Fall and delirium precautions  · Supportive care

## 2020-08-30 NOTE — ASSESSMENT & PLAN NOTE
· Patient presented to [facility] complaining of b/l foot pain, left foot discoloration and b/l LE cool to touch per ED records  There was concern for possible ischemia of leg and she was started on Heparin GTT and transferred to [facility] for Vascular Surgery evaluation  · Vascular Surgery input appreciated  Per Vascular - CTA demonstrated 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  Per record review, Vascular discussed with the patient's son, who did not wish for any invasive procedures    · Palliative Care and Hospice now following and heparin GTT was discontinued   Pain control

## 2020-08-30 NOTE — PLAN OF CARE
Problem: Potential for Falls  Goal: Patient will remain free of falls  Description: INTERVENTIONS:  - Assess patient frequently for physical needs  -  Identify cognitive and physical deficits and behaviors that affect risk of falls    -  [place] fall precautions as indicated by assessment   - Educate patient/family on patient safety including physical limitations  - Instruct patient to call for assistance with activity based on assessment  - Modify environment to reduce risk of injury  - Consider OT/PT consult to assist with strengthening/mobility  Outcome: Progressing     Problem: Prexisting or High Potential for Compromised Skin Integrity  Goal: Skin integrity is maintained or improved  Description: INTERVENTIONS:  - Identify patients at risk for skin breakdown  - Assess and monitor skin integrity  - Assess and monitor nutrition and hydration status  - Monitor labs   - Assess for incontinence   - Turn and reposition patient  - Assist with mobility/ambulation  - Relieve pressure over bony prominences  - Avoid friction and shearing  - Provide appropriate hygiene as needed including keeping skin clean and dry  - Evaluate need for skin moisturizer/barrier cream  - Collaborate with interdisciplinary team   - Patient/family teaching  - Consider wound care consult   Outcome: Progressing     Problem: PAIN - ADULT  Goal: Verbalizes/displays adequate comfort level or baseline comfort level  Description: Interventions:  - Encourage patient to monitor pain and request assistance  - Assess pain using appropriate pain scale  - Administer analgesics based on type and severity of pain and evaluate response  - Implement non-pharmacological measures as appropriate and evaluate response  - Consider cultural and social influences on pain and pain management  - Notify physician/advanced practitioner if interventions unsuccessful or patient reports new pain  Outcome: Progressing     Problem: INFECTION - ADULT  Goal: Absence or prevention of progression during hospitalization  Description: INTERVENTIONS:  - Assess and monitor for signs and symptoms of infection  - Monitor lab/diagnostic results  - Monitor all insertion sites, i e  indwelling lines, tubes, and drains  - Monitor endotracheal if appropriate and nasal secretions for changes in amount and color  - [place] appropriate cooling/warming therapies per order  - Administer medications as ordered  - Instruct and encourage patient and family to use good hand hygiene technique  - Identify and instruct in appropriate isolation precautions for identified infection/condition  Outcome: Progressing  Goal: Absence of fever/infection during neutropenic period  Description: INTERVENTIONS:  - Monitor WBC    Outcome: Progressing     Problem: SAFETY ADULT  Goal: Patient will remain free of falls  Description: INTERVENTIONS:  - Assess patient frequently for physical needs  -  Identify cognitive and physical deficits and behaviors that affect risk of falls    -  [place] fall precautions as indicated by assessment   - Educate patient/family on patient safety including physical limitations  - Instruct patient to call for assistance with activity based on assessment  - Modify environment to reduce risk of injury  - Consider OT/PT consult to assist with strengthening/mobility  Outcome: Progressing  Goal: Maintain or return to baseline ADL function  Description: INTERVENTIONS:  -  Assess patient's ability to carry out ADLs; assess patient's baseline for ADL function and identify physical deficits which impact ability to perform ADLs (bathing, care of mouth/teeth, toileting, grooming, dressing, etc )  - Assess/evaluate cause of self-care deficits   - Assess range of motion  - Assess patient's mobility; develop plan if impaired  - Assess patient's need for assistive devices and provide as appropriate  - Encourage maximum independence but intervene and supervise when necessary  - Involve family in performance of ADLs  - Assess for home care needs following discharge   - Consider OT consult to assist with ADL evaluation and planning for discharge  - Provide patient education as appropriate  Outcome: Progressing  Goal: Maintain or return mobility status to optimal level  Description: INTERVENTIONS:  - Assess patient's baseline mobility status (ambulation, transfers, stairs, etc )    - Identify cognitive and physical deficits and behaviors that affect mobility  - Identify mobility aids required to assist with transfers and/or ambulation (gait belt, sit-to-stand, lift, walker, cane, etc )  - [place] fall precautions as indicated by assessment  - Record patient progress and toleration of activity level on Mobility SBAR; progress patient to next Phase/Stage  - Instruct patient to call for assistance with activity based on assessment  - Consider rehabilitation consult to assist with strengthening/weightbearing, etc   Outcome: Progressing     Problem: DISCHARGE PLANNING  Goal: Discharge to home or other facility with appropriate resources  Description: INTERVENTIONS:  - Identify barriers to discharge w/patient and caregiver  - Arrange for needed discharge resources and transportation as appropriate  - Identify discharge learning needs (meds, wound care, etc )  - Arrange for interpretive services to assist at discharge as needed  - Refer to Case Management Department for coordinating discharge planning if the patient needs post-hospital services based on physician/advanced practitioner order or complex needs related to functional status, cognitive ability, or social support system  Outcome: Progressing     Problem: Knowledge Deficit  Goal: Patient/family/caregiver demonstrates understanding of disease process, treatment plan, medications, and discharge instructions  Description: Complete learning assessment and assess knowledge base    Interventions:  - Provide teaching at level of understanding  - Provide teaching via preferred learning methods  Outcome: Progressing

## 2020-08-30 NOTE — SOCIAL WORK
Per hospice liaison, pt not IP appropriate today  Will need snf placement  [facility] reviewing

## 2020-08-30 NOTE — ASSESSMENT & PLAN NOTE
· Currently rate controlled on metoprolol  · Normally anticoagulated on Xarelto   Now off [facility] as patient is comfort care

## 2020-08-30 NOTE — ASSESSMENT & PLAN NOTE
· Palliative Care and Hospice following, appreciate their recommendations  · Per hospice liaison, patient no longer inpatient hospice appropriate

## 2020-08-31 VITALS
TEMPERATURE: 98.3 F | HEIGHT: 66 IN | DIASTOLIC BLOOD PRESSURE: 74 MMHG | BODY MASS INDEX: 23.63 KG/M2 | SYSTOLIC BLOOD PRESSURE: 147 MMHG | OXYGEN SATURATION: 94 % | RESPIRATION RATE: 19 BRPM | WEIGHT: 147 LBS | HEART RATE: 91 BPM

## 2020-08-31 LAB — SARS-COV-2 RNA RESP QL NAA+PROBE: NEGATIVE

## 2020-08-31 PROCEDURE — 99239 HOSP IP/OBS DSCHRG MGMT >30: CPT | Performed by: PHYSICIAN ASSISTANT

## 2020-08-31 PROCEDURE — U0003 INFECTIOUS AGENT DETECTION BY NUCLEIC ACID (DNA OR RNA); SEVERE ACUTE RESPIRATORY SYNDROME CORONAVIRUS 2 (SARS-COV-2) (CORONAVIRUS DISEASE [COVID-19]), AMPLIFIED PROBE TECHNIQUE, MAKING USE OF HIGH THROUGHPUT TECHNOLOGIES AS DESCRIBED BY CMS-2020-01-R: HCPCS | Performed by: PHYSICIAN ASSISTANT

## 2020-08-31 RX ORDER — LORAZEPAM 0.5 MG/1
0.5 TABLET ORAL EVERY 4 HOURS PRN
Qty: 20 TABLET | Refills: 0 | Status: SHIPPED | OUTPATIENT
Start: 2020-08-31 | End: 2020-09-10

## 2020-08-31 RX ORDER — HALOPERIDOL 0.5 MG/1
0.5 TABLET ORAL EVERY 4 HOURS PRN
Qty: 10 TABLET | Refills: 0 | Status: SHIPPED | OUTPATIENT
Start: 2020-08-31

## 2020-08-31 RX ORDER — OXYCODONE HYDROCHLORIDE 5 MG/1
5 TABLET ORAL EVERY 4 HOURS PRN
Qty: 30 TABLET | Refills: 0 | Status: SHIPPED | OUTPATIENT
Start: 2020-08-31 | End: 2020-09-10

## 2020-08-31 RX ADMIN — POLYETHYLENE GLYCOL 3350 17 G: 17 POWDER, FOR SOLUTION ORAL at 10:02

## 2020-08-31 RX ADMIN — LEVETIRACETAM 1000 MG: 100 INJECTION, SOLUTION INTRAVENOUS at 10:01

## 2020-08-31 NOTE — PLAN OF CARE
Problem: Potential for Falls  Goal: Patient will remain free of falls  Description: INTERVENTIONS:  - Assess patient frequently for physical needs  -  Identify cognitive and physical deficits and behaviors that affect risk of falls    -  [place] fall precautions as indicated by assessment   - Educate patient/family on patient safety including physical limitations  - Instruct patient to call for assistance with activity based on assessment  - Modify environment to reduce risk of injury  - Consider OT/PT consult to assist with strengthening/mobility  Outcome: Adequate for Discharge     Problem: Prexisting or High Potential for Compromised Skin Integrity  Goal: Skin integrity is maintained or improved  Description: INTERVENTIONS:  - Identify patients at risk for skin breakdown  - Assess and monitor skin integrity  - Assess and monitor nutrition and hydration status  - Monitor labs   - Assess for incontinence   - Turn and reposition patient  - Assist with mobility/ambulation  - Relieve pressure over bony prominences  - Avoid friction and shearing  - Provide appropriate hygiene as needed including keeping skin clean and dry  - Evaluate need for skin moisturizer/barrier cream  - Collaborate with interdisciplinary team   - Patient/family teaching  - Consider wound care consult   Outcome: Adequate for Discharge     Problem: PAIN - ADULT  Goal: Verbalizes/displays adequate comfort level or baseline comfort level  Description: Interventions:  - Encourage patient to monitor pain and request assistance  - Assess pain using appropriate pain scale  - Administer analgesics based on type and severity of pain and evaluate response  - Implement non-pharmacological measures as appropriate and evaluate response  - Consider cultural and social influences on pain and pain management  - Notify physician/advanced practitioner if interventions unsuccessful or patient reports new pain  Outcome: Adequate for Discharge     Problem: INFECTION - ADULT  Goal: Absence or prevention of progression during hospitalization  Description: INTERVENTIONS:  - Assess and monitor for signs and symptoms of infection  - Monitor lab/diagnostic results  - Monitor all insertion sites, i e  indwelling lines, tubes, and drains  - Monitor endotracheal if appropriate and nasal secretions for changes in amount and color  - [place] appropriate cooling/warming therapies per order  - Administer medications as ordered  - Instruct and encourage patient and family to use good hand hygiene technique  - Identify and instruct in appropriate isolation precautions for identified infection/condition  Outcome: Adequate for Discharge  Goal: Absence of fever/infection during neutropenic period  Description: INTERVENTIONS:  - Monitor WBC    Outcome: Adequate for Discharge     Problem: SAFETY ADULT  Goal: Patient will remain free of falls  Description: INTERVENTIONS:  - Assess patient frequently for physical needs  -  Identify cognitive and physical deficits and behaviors that affect risk of falls    -  [place] fall precautions as indicated by assessment   - Educate patient/family on patient safety including physical limitations  - Instruct patient to call for assistance with activity based on assessment  - Modify environment to reduce risk of injury  - Consider OT/PT consult to assist with strengthening/mobility  Outcome: Adequate for Discharge  Goal: Maintain or return to baseline ADL function  Description: INTERVENTIONS:  -  Assess patient's ability to carry out ADLs; assess patient's baseline for ADL function and identify physical deficits which impact ability to perform ADLs (bathing, care of mouth/teeth, toileting, grooming, dressing, etc )  - Assess/evaluate cause of self-care deficits   - Assess range of motion  - Assess patient's mobility; develop plan if impaired  - Assess patient's need for assistive devices and provide as appropriate  - Encourage maximum independence but intervene and supervise when necessary  - Involve family in performance of ADLs  - Assess for home care needs following discharge   - Consider OT consult to assist with ADL evaluation and planning for discharge  - Provide patient education as appropriate  Outcome: Adequate for Discharge  Goal: Maintain or return mobility status to optimal level  Description: INTERVENTIONS:  - Assess patient's baseline mobility status (ambulation, transfers, stairs, etc )    - Identify cognitive and physical deficits and behaviors that affect mobility  - Identify mobility aids required to assist with transfers and/or ambulation (gait belt, sit-to-stand, lift, walker, cane, etc )  - [place] fall precautions as indicated by assessment  - Record patient progress and toleration of activity level on Mobility SBAR; progress patient to next Phase/Stage  - Instruct patient to call for assistance with activity based on assessment  - Consider rehabilitation consult to assist with strengthening/weightbearing, etc   Outcome: Adequate for Discharge     Problem: DISCHARGE PLANNING  Goal: Discharge to home or other facility with appropriate resources  Description: INTERVENTIONS:  - Identify barriers to discharge w/patient and caregiver  - Arrange for needed discharge resources and transportation as appropriate  - Identify discharge learning needs (meds, wound care, etc )  - Arrange for interpretive services to assist at discharge as needed  - Refer to Case Management Department for coordinating discharge planning if the patient needs post-hospital services based on physician/advanced practitioner order or complex needs related to functional status, cognitive ability, or social support system  Outcome: Adequate for Discharge     Problem: Knowledge Deficit  Goal: Patient/family/caregiver demonstrates understanding of disease process, treatment plan, medications, and discharge instructions  Description: Complete learning assessment and assess knowledge base    Interventions:  - Provide teaching at level of understanding  - Provide teaching via preferred learning methods  Outcome: Adequate for Discharge

## 2020-08-31 NOTE — PLAN OF CARE
Problem: Potential for Falls  Goal: Patient will remain free of falls  Description: INTERVENTIONS:  - Assess patient frequently for physical needs  -  Identify cognitive and physical deficits and behaviors that affect risk of falls    -  [place] fall precautions as indicated by assessment   - Educate patient/family on patient safety including physical limitations  - Instruct patient to call for assistance with activity based on assessment  - Modify environment to reduce risk of injury  - Consider OT/PT consult to assist with strengthening/mobility  Outcome: Progressing     Problem: Prexisting or High Potential for Compromised Skin Integrity  Goal: Skin integrity is maintained or improved  Description: INTERVENTIONS:  - Identify patients at risk for skin breakdown  - Assess and monitor skin integrity  - Assess and monitor nutrition and hydration status  - Monitor labs   - Assess for incontinence   - Turn and reposition patient  - Assist with mobility/ambulation  - Relieve pressure over bony prominences  - Avoid friction and shearing  - Provide appropriate hygiene as needed including keeping skin clean and dry  - Evaluate need for skin moisturizer/barrier cream  - Collaborate with interdisciplinary team   - Patient/family teaching  - Consider wound care consult   Outcome: Progressing     Problem: PAIN - ADULT  Goal: Verbalizes/displays adequate comfort level or baseline comfort level  Description: Interventions:  - Encourage patient to monitor pain and request assistance  - Assess pain using appropriate pain scale  - Administer analgesics based on type and severity of pain and evaluate response  - Implement non-pharmacological measures as appropriate and evaluate response  - Consider cultural and social influences on pain and pain management  - Notify physician/advanced practitioner if interventions unsuccessful or patient reports new pain  Outcome: Progressing     Problem: INFECTION - ADULT  Goal: Absence or prevention of progression during hospitalization  Description: INTERVENTIONS:  - Assess and monitor for signs and symptoms of infection  - Monitor lab/diagnostic results  - Monitor all insertion sites, i e  indwelling lines, tubes, and drains  - Monitor endotracheal if appropriate and nasal secretions for changes in amount and color  - [place] appropriate cooling/warming therapies per order  - Administer medications as ordered  - Instruct and encourage patient and family to use good hand hygiene technique  - Identify and instruct in appropriate isolation precautions for identified infection/condition  Outcome: Progressing  Goal: Absence of fever/infection during neutropenic period  Description: INTERVENTIONS:  - Monitor WBC    Outcome: Progressing     Problem: SAFETY ADULT  Goal: Patient will remain free of falls  Description: INTERVENTIONS:  - Assess patient frequently for physical needs  -  Identify cognitive and physical deficits and behaviors that affect risk of falls    -  [place] fall precautions as indicated by assessment   - Educate patient/family on patient safety including physical limitations  - Instruct patient to call for assistance with activity based on assessment  - Modify environment to reduce risk of injury  - Consider OT/PT consult to assist with strengthening/mobility  Outcome: Progressing  Goal: Maintain or return to baseline ADL function  Description: INTERVENTIONS:  -  Assess patient's ability to carry out ADLs; assess patient's baseline for ADL function and identify physical deficits which impact ability to perform ADLs (bathing, care of mouth/teeth, toileting, grooming, dressing, etc )  - Assess/evaluate cause of self-care deficits   - Assess range of motion  - Assess patient's mobility; develop plan if impaired  - Assess patient's need for assistive devices and provide as appropriate  - Encourage maximum independence but intervene and supervise when necessary  - Involve family in performance of ADLs  - Assess for home care needs following discharge   - Consider OT consult to assist with ADL evaluation and planning for discharge  - Provide patient education as appropriate  Outcome: Progressing  Goal: Maintain or return mobility status to optimal level  Description: INTERVENTIONS:  - Assess patient's baseline mobility status (ambulation, transfers, stairs, etc )    - Identify cognitive and physical deficits and behaviors that affect mobility  - Identify mobility aids required to assist with transfers and/or ambulation (gait belt, sit-to-stand, lift, walker, cane, etc )  - [place] fall precautions as indicated by assessment  - Record patient progress and toleration of activity level on Mobility SBAR; progress patient to next Phase/Stage  - Instruct patient to call for assistance with activity based on assessment  - Consider rehabilitation consult to assist with strengthening/weightbearing, etc   Outcome: Progressing     Problem: DISCHARGE PLANNING  Goal: Discharge to home or other facility with appropriate resources  Description: INTERVENTIONS:  - Identify barriers to discharge w/patient and caregiver  - Arrange for needed discharge resources and transportation as appropriate  - Identify discharge learning needs (meds, wound care, etc )  - Arrange for interpretive services to assist at discharge as needed  - Refer to Case Management Department for coordinating discharge planning if the patient needs post-hospital services based on physician/advanced practitioner order or complex needs related to functional status, cognitive ability, or social support system  Outcome: Progressing     Problem: Knowledge Deficit  Goal: Patient/family/caregiver demonstrates understanding of disease process, treatment plan, medications, and discharge instructions  Description: Complete learning assessment and assess knowledge base    Interventions:  - Provide teaching at level of understanding  - Provide teaching via preferred learning methods  Outcome: Progressing

## 2020-08-31 NOTE — DISCHARGE SUMMARY
Discharge Summary - [facility]    Patient Information: Desiree Dobbs 80 y o  female MRN: [id number]  Unit/Bed#: -01 Encounter: [id number]    Discharging Physician / Practitioner: Kerwin Inman PA-C  PCP: Tanmay Fine MD  Admission Date: 8/27/2020  Discharge Date: 08/31/20    Reason for Admission: suspected ischemic leg     Discharge Diagnoses:     Principal Problem:    Suspected ischemic leg  Active Problems:    Goals of care, counseling/discussion    Vascular dementia without behavioral disturbance (HCC)    Paroxysmal atrial fibrillation ([facility] )    Seizure disorder ([facility] )    Hyperthyroidism    Essential hypertension  Resolved Problems:    * No resolved hospital problems  *      Consultations During Hospital Stay:  · Vascular Surgery  · Palliative Care  · Hospice    Procedures Performed:   · X-ray right foot  · X-ray left foot  · CTA abdominal with runoff  · VAS lower limb arterial duplex  · CT head  · BMP  · Lactic acid  · CBC  · COVID-19 PCR    Significant Findings:   · X-ray right foot:  No acute osseous abnormality  · X-ray left foot:  No acute osseous abnormality  · CTA abdominal with runoff:  Very slow flow in the left foot  Can't exclude distal tibial disease  Left adrenal mass  This does not be any of the criteria for a benign adenoma  Trace ascited  Insufficiency fracture  Inferior vena cava filter   · VAS lower limb arterial duplex:  · Right lower limb: This resting evaluation shows no evidence of significant lower extremity arterial occlusive disease  YENNY 0 85  PVR/PPG tracings are normal   Metatarsal pressure of 161 mmHg  Great toe pressure of 169 mmHg  · Left lower limb: This resting evaluation shows no evidence of significant lower extremity arterial occlusive disease  YENNY 1 08  PVR/PPG tracings are dampened  Metatarsal pressure of 171 mm Hg    Great toe pressure of 149 mmHg  · CT head: no acute intracranial abnormality within limitations of recent contrast imaging  · COVID-19 PCR: negative    Incidental Findings:   · None     Test Results Pending at Discharge (will require follow up): · None     Outpatient Tests Requested:  · None    Complications:  None    Hospital Course:     Fortino Lyman is a 80 y o  female with vascular dementia, PAF, seizure disorder, hyperthyroidism, and HTN who originally presented to the hospital on 8/27/2020 as a transfer from [address] [place] [address]  She initially presented there with complaint of cold feet and left foot discoloration  There was concern at [facility] for ischemic leg and the patient was started on a heparin GTT and transferred to [place] for Vascular consult  CTA and LEADs were performed as above  She was seen by Vascular Surgery who reviewed the imaging and noted it to have 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  Vascular Surgery discussed options with the patient's son who wished not to proceed with any invasive procedures  Palliative Care was consulted  Patient was lethargic and not tolerating PO food or medications and was initially approved for the inpatient hospice unit, however no beds were available  Over the weekend the patient's mental status noted to improve and she was more aware/alert and tolerating PO  No longer encephalopathic and no longer appropriate for the IPU  Palliative Care adjusted comfort meds to include PO options  Patient approved for SNF hospice  COVID-19 swab was negative prior to discharge to facility  Patient was accepted at [facility] on hospice  Palliative Care provided comfort med scripts and advised to continue home meds and that these would be adjusted when hospice opens at facility  I discussed with the patient, , nursing, Palliative Care on day of discharge  Condition at Discharge: stable     Discharge Day Visit / Exam:     Subjective:    Ms Elisa Villavicencio denies pain in her foot   She asks if she will be able to watch the Phillies at [facility]: Blood Pressure: 147/74 (08/31/20 0803)  Pulse: 91 (08/31/20 0803)  Temperature: 98 3 °F (36 8 °C) (08/31/20 0803)  Temp Source: Oral (08/30/20 1003)  Respirations: 19 (08/31/20 0803)  Height: 5' 6" (167 6 cm) (08/27/20 0410)  Weight - Scale: 66 7 kg (147 lb) (08/27/20 0410)  SpO2: 93 % (08/31/20 0803)  Exam:   Physical Exam  Vitals signs and nursing note reviewed  Constitutional:       Comments: Patient seen lying in bed comfortably resting listening to music on the TV   Cardiovascular:      Rate and Rhythm: Normal rate and regular rhythm  Pulmonary:      Effort: Pulmonary effort is normal  No respiratory distress  Breath sounds: Normal breath sounds  Abdominal:      General: Bowel sounds are normal       Palpations: Abdomen is soft  Tenderness: There is no abdominal tenderness  Musculoskeletal:      Comments: Discolored left foot   Skin:     General: Skin is warm  Neurological:      Mental Status: She is alert  Comments: Oriented to person and hospital but when asked the year states "1, 2, 1, I don't know, maybe 2?"   Psychiatric:      Comments: Not agitated appearing         Discharge instructions/Information to patient and family:   See after visit summary for information provided to patient and family  Provisions for Follow-Up Care:  See after visit summary for information related to follow-up care and any pertinent home health orders  Disposition:     [facility] (see below) hospice     For Discharges to [facility] SNF:   · [facility] - no role on TT available for [place]    Planned Readmission: none     Discharge Statement:  I spent 45 minutes discharging the patient  This time was spent on the day of discharge  I had direct contact with the patient on the day of discharge   Greater than 50% of the total time was spent examining patient, answering all patient questions, arranging and discussing plan of care with patient as well as directly providing post-discharge instructions  Additional time then spent on discharge activities  Discharge Medications:  See after visit summary for reconciled discharge medications provided to patient and family  ** Please Note: Dragon 360 Dictation voice to text software may have been used in the creation of this document   **

## 2020-08-31 NOTE — SOCIAL WORK
CM was informed by Bryon Patel, hospice liaison that pt is still SNF hospice appropriate today  CM called and spoke with [facility] admission liaison to confirm acceptance for SNF  Mariz[facility] e-mailed CM financial form paramjit to be completed prior to acceptance of pt  CM called pt's son Sarai Trevino and informed him of the above  CM completed the SNF financial paramjit with Sarai Trevino over the phone and faxed the said completed form to [facility]- [phone] aware  CM will follow

## 2020-08-31 NOTE — SOCIAL WORK
CM received a call from Elayne Polk with [facility] that she spoke with pt's son Khushboo Chaves and that they can accept pt pending Covid test   Transportation was set up at 6:45 pm tonight via [facility]  CM was informed by Lisa Layne that [address] will open pt on Wednesday, 9/2  Marizol with [facility] aware of the said info  CMN done, copy placed in MR bin  Dr Gely Loredo was informed for Narc rx needed for pt  KRYSTYNA DNR and hospice consent in dc envelop  CM informed PAc Emilee Padilla, ROSHAN hospice liaison, flr RN and pt's son of the above

## 2020-08-31 NOTE — SOCIAL WORK
Hospice rx faxed to [facility]- [phone]  CM tried to call [facility]- [phone], no answer, unable to leave message d/t no voicemail set up

## 2020-08-31 NOTE — PROGRESS NOTES
08/31/20 1300   Clinical Encounter Type   Visited With Patient   Sikh Encounters   Sikh Needs Prayer  ( [place])   Sacramental Encounters   Sacrament of Sick-Anointing Anointed  (Sacrament of the Sick)

## 2020-09-04 DIAGNOSIS — E05.90 HYPERTHYROIDISM: ICD-10-CM

## 2020-09-04 DIAGNOSIS — I10 ESSENTIAL HYPERTENSION: ICD-10-CM

## 2020-09-04 RX ORDER — METOPROLOL TARTRATE 50 MG/1
TABLET, FILM COATED ORAL
Qty: 84 TABLET | Refills: 4 | Status: SHIPPED | OUTPATIENT
Start: 2020-09-04

## 2020-09-04 RX ORDER — METHIMAZOLE 10 MG/1
TABLET ORAL
Qty: 84 TABLET | Refills: 4 | Status: SHIPPED | OUTPATIENT
Start: 2020-09-04

## 2020-10-03 ENCOUNTER — TELEPHONE (OUTPATIENT)
Dept: OTHER | Facility: OTHER | Age: 85
End: 2020-10-03

## 2020-11-11 ENCOUNTER — TELEPHONE (OUTPATIENT)
Dept: FAMILY MEDICINE CLINIC | Facility: HOSPITAL | Age: 85
End: 2020-11-11

## 2020-11-13 DIAGNOSIS — R62.51 FAILURE TO THRIVE (0-17): Primary | ICD-10-CM

## 2020-11-13 RX ORDER — MORPHINE SULFATE 100 MG/5ML
5 SOLUTION ORAL EVERY 2 HOUR PRN
Qty: 30 ML | Refills: 0 | Status: SHIPPED | OUTPATIENT
Start: 2020-11-13

## 2024-03-22 NOTE — PROGRESS NOTES
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  Per record review, Vascular discussed with the patient's son, who did not wish for any invasive procedures    · Palliative Care and Hospice now following and heparin GTT was discontinued  Pain control     Goals of care, counseling/discussion  Assessment & Plan  · Palliative Care and Hospice following, appreciate their recommendations  · Per hospice liaison, patient no longer inpatient hospice appropriate    Vascular dementia without behavioral disturbance [facility])  Assessment & Plan  · Was living in [address] [facility] living facility prior to admission per son  · Fall and delirium precautions  · Supportive care     Paroxysmal atrial fibrillation ([facility] [address] )  Assessment & Plan  · Currently rate controlled on metoprolol  · Normally anticoagulated on Xarelto  Now off AC as patient is comfort care    Seizure disorder ([facility] [address] [address] )  Assessment & Plan  · No recent reported seizures  · Continue Keppra IV     Hyperthyroidism  Assessment & Plan  · Off methimazole now that patient is comfort care     Essential hypertension  Assessment & Plan  · Patient is comfort care      VTE Pharmacologic Prophylaxis:   Pharmacologic: none, patient is comfort care  Mechanical VTE Prophylaxis in Place: No    Patient Centered Rounds: I have performed bedside rounds with nursing staff today   Romero Baron    Discussions with Specialists or Other Care Team Provider: Martine hospice liaison     Education and Discussions with Family / Patient: patient and her son, Oscar Browning, over the phone    Time Spent for Care: 20 minutes  More than 50% of total time spent on counseling and coordination of care as described above  Current Length of Stay: 3 day(s)    Current Patient Status: Inpatient   Certification Statement: The patient will continue to require additional inpatient hospital stay due to discharge planning to hospice    Discharge Plan: no longer qualifies for IPU per hospice  Code Status: Level 4 - Comfort Care      Subjective:   Ms Veronica Meade reports foot pain but says she doesn't want anything for the pain  She says she wants to get out of the hospital    Objective:     Vitals:   Temp (24hrs), Av 7 °F (37 1 °C), Min:98 7 °F (37 1 °C), Max:98 7 °F (37 1 °C)    Temp:  [98 7 °F (37 1 °C)] 98 7 °F (37 1 °C)  HR:  [] 104  Resp:  [16] 16  BP: (139-152)/(95-98) 139/95  SpO2:  [93 %-96 %] 93 %  Body mass index is 23 73 kg/m²  Input and Output Summary (last 24 hours): Intake/Output Summary (Last 24 hours) at 2020 1037  Last data filed at 2020 1003  Gross per 24 hour   Intake 220 ml   Output 1043 ml   Net -823 ml       Physical Exam:     Physical Exam  Vitals signs and nursing note reviewed  Constitutional:       Comments: Patient seen sitting in bed comfortably resting watching TV   Cardiovascular:      Rate and Rhythm: Regular rhythm  Tachycardia present  Pulmonary:      Effort: Pulmonary effort is normal       Breath sounds: Normal breath sounds  Abdominal:      General: Bowel sounds are normal       Palpations: Abdomen is soft  Tenderness: There is no abdominal tenderness  Musculoskeletal:      Comments: Discolored left foot   Neurological:      Mental Status: She is alert        Comments: Oriented to person and hospital but when asked the year she states "one, two, I don't know"   Psychiatric: Comments: Not agitated appearing           Additional Data:     Labs:    Results from last 7 days   Lab Units 08/27/20  0113   WBC Thousand/uL 8 20   HEMOGLOBIN g/dL 14 2   HEMATOCRIT % 41 9   PLATELETS Thousands/uL 199   NEUTROS PCT % 67   LYMPHS PCT % 24   MONOS PCT % 7   EOS PCT % 2     Results from last 7 days   Lab Units 08/27/20  0113   SODIUM mmol/L 140   POTASSIUM mmol/L 4 6   CHLORIDE mmol/L 104   CO2 mmol/L 30   BUN mg/dL 17   CREATININE mg/dL 0 63   ANION GAP mmol/L 6   CALCIUM mg/dL 9 7   GLUCOSE RANDOM mg/dL 84     Results from last 7 days   Lab Units 08/27/20  0113   INR  1 11             Results from last 7 days   Lab Units 08/27/20  0113   LACTIC ACID mmol/L 1 5           * I Have Reviewed All Lab Data Listed Above  * Additional Pertinent Lab Tests Reviewed:  All Labs Within Last 24 Hours Reviewed    Imaging:    Imaging Reports Reviewed Today Include: None  Imaging Personally Reviewed by Myself Includes:  none    Recent Cultures (last 7 days):           Last 24 Hours Medication List:   Current Facility-Administered Medications   Medication Dose Route Frequency Provider Last Rate    acetaminophen  650 mg Oral Q6H PRN Mimi Theodore MD      artificial tear   Both Eyes HS Neena Archer,       bisacodyl  10 mg Rectal Daily PRN Mimi Theodore MD      haloperidol  0 5 mg Oral Q4H PRN Mimi Theodore MD      haloperidol lactate  0 5 mg Intravenous Q4H PRN Mimi Theodore MD      HYDROmorphone  0 1 mg Intravenous Q2H PRN Mimi Theodore MD      levETIRAcetam  1,000 mg Intravenous Q12H [address] Rajani IRENA Jennings 1,000 mg (08/30/20 3502)    LORazepam  0 5 mg Intravenous Q4H PRN Mimi Theodore MD      LORazepam  0 5 mg Oral Q4H PRN Mimi Theodore MD      ondansetron  4 mg Intravenous Q6H PRN Neena Archer DO      oxyCODONE  2 5 mg Oral Q4H PRN Mimi Theodore MD      Or   [facility] oxyCODONE  5 mg Oral Q4H PRN Mimi Theodore MD      polyethylene glycol  17 g Oral Daily Mimi Theodore MD  senna  1 tablet Oral BID PRN Emy Vanessa MD          Today, Patient Was Seen By: Archie Alfredo PA-C    ** Please Note: Dictation voice to text software may have been used in the creation of this document   ** Patient agrees to pharmacologic DVT prophylaxis.   Will obtain B/L venous Duplex to exclude an occult DVT.

## 2025-02-12 NOTE — ED NOTES
Form scan to chart and place I  bin under (R)    first attempt to contact patient. no answer left message to return my call on answering machine    Patient discharged from ED after having instructions reviewed  Patient verbalized understanding of follow up care and will remain in ED until [facility] is able to  patient  [facility]        Yareli Blackburn RN  11/27/19 7812